# Patient Record
Sex: FEMALE | Race: WHITE | HISPANIC OR LATINO | Employment: OTHER | URBAN - METROPOLITAN AREA
[De-identification: names, ages, dates, MRNs, and addresses within clinical notes are randomized per-mention and may not be internally consistent; named-entity substitution may affect disease eponyms.]

---

## 2017-02-20 ENCOUNTER — TRANSCRIBE ORDERS (OUTPATIENT)
Dept: ADMINISTRATIVE | Facility: HOSPITAL | Age: 57
End: 2017-02-20

## 2017-02-20 ENCOUNTER — ALLSCRIPTS OFFICE VISIT (OUTPATIENT)
Dept: OTHER | Facility: OTHER | Age: 57
End: 2017-02-20

## 2017-02-20 DIAGNOSIS — C50.912 MALIGNANT NEOPLASM OF LEFT FEMALE BREAST, UNSPECIFIED SITE OF BREAST: Primary | ICD-10-CM

## 2017-03-21 ENCOUNTER — APPOINTMENT (OUTPATIENT)
Dept: LAB | Facility: HOSPITAL | Age: 57
End: 2017-03-21
Payer: COMMERCIAL

## 2017-03-21 DIAGNOSIS — E78.5 HYPERLIPIDEMIA: ICD-10-CM

## 2017-03-21 DIAGNOSIS — L30.9 DERMATITIS: ICD-10-CM

## 2017-03-21 DIAGNOSIS — E03.9 HYPOTHYROIDISM: ICD-10-CM

## 2017-03-21 DIAGNOSIS — I10 ESSENTIAL (PRIMARY) HYPERTENSION: ICD-10-CM

## 2017-03-21 LAB
ALBUMIN SERPL BCP-MCNC: 3.4 G/DL (ref 3.5–5)
ALP SERPL-CCNC: 48 U/L (ref 46–116)
ALT SERPL W P-5'-P-CCNC: 22 U/L (ref 12–78)
ANION GAP SERPL CALCULATED.3IONS-SCNC: 8 MMOL/L (ref 4–13)
AST SERPL W P-5'-P-CCNC: 15 U/L (ref 5–45)
BASOPHILS # BLD AUTO: 0.03 THOUSANDS/ΜL (ref 0–0.1)
BASOPHILS NFR BLD AUTO: 1 % (ref 0–1)
BILIRUB SERPL-MCNC: 0.32 MG/DL (ref 0.2–1)
BUN SERPL-MCNC: 23 MG/DL (ref 5–25)
CALCIUM SERPL-MCNC: 9.1 MG/DL (ref 8.3–10.1)
CHLORIDE SERPL-SCNC: 104 MMOL/L (ref 100–108)
CHOLEST SERPL-MCNC: 154 MG/DL (ref 50–200)
CO2 SERPL-SCNC: 29 MMOL/L (ref 21–32)
CREAT SERPL-MCNC: 1.07 MG/DL (ref 0.6–1.3)
EOSINOPHIL # BLD AUTO: 0.09 THOUSAND/ΜL (ref 0–0.61)
EOSINOPHIL NFR BLD AUTO: 2 % (ref 0–6)
ERYTHROCYTE [DISTWIDTH] IN BLOOD BY AUTOMATED COUNT: 13.2 % (ref 11.6–15.1)
GFR SERPL CREATININE-BSD FRML MDRD: 53 ML/MIN/1.73SQ M
GLUCOSE P FAST SERPL-MCNC: 109 MG/DL (ref 65–99)
HCT VFR BLD AUTO: 36 % (ref 34.8–46.1)
HDLC SERPL-MCNC: 71 MG/DL (ref 40–60)
HGB BLD-MCNC: 11.5 G/DL (ref 11.5–15.4)
LDLC SERPL CALC-MCNC: 69 MG/DL (ref 0–100)
LYMPHOCYTES # BLD AUTO: 2.11 THOUSANDS/ΜL (ref 0.6–4.47)
LYMPHOCYTES NFR BLD AUTO: 42 % (ref 14–44)
MCH RBC QN AUTO: 27.8 PG (ref 26.8–34.3)
MCHC RBC AUTO-ENTMCNC: 31.9 G/DL (ref 31.4–37.4)
MCV RBC AUTO: 87 FL (ref 82–98)
MONOCYTES # BLD AUTO: 0.45 THOUSAND/ΜL (ref 0.17–1.22)
MONOCYTES NFR BLD AUTO: 9 % (ref 4–12)
NEUTROPHILS # BLD AUTO: 2.37 THOUSANDS/ΜL (ref 1.85–7.62)
NEUTS SEG NFR BLD AUTO: 46 % (ref 43–75)
PLATELET # BLD AUTO: 373 THOUSANDS/UL (ref 149–390)
PMV BLD AUTO: 9.7 FL (ref 8.9–12.7)
POTASSIUM SERPL-SCNC: 3.8 MMOL/L (ref 3.5–5.3)
PROT SERPL-MCNC: 7.1 G/DL (ref 6.4–8.2)
RBC # BLD AUTO: 4.14 MILLION/UL (ref 3.81–5.12)
SODIUM SERPL-SCNC: 141 MMOL/L (ref 136–145)
T4 FREE SERPL-MCNC: 1.41 NG/DL (ref 0.76–1.46)
TRIGL SERPL-MCNC: 69 MG/DL
TSH SERPL DL<=0.05 MIU/L-ACNC: 0.52 UIU/ML (ref 0.36–3.74)
WBC # BLD AUTO: 5.05 THOUSAND/UL (ref 4.31–10.16)

## 2017-03-21 PROCEDURE — 84439 ASSAY OF FREE THYROXINE: CPT

## 2017-03-21 PROCEDURE — 85025 COMPLETE CBC W/AUTO DIFF WBC: CPT

## 2017-03-21 PROCEDURE — 84443 ASSAY THYROID STIM HORMONE: CPT

## 2017-03-21 PROCEDURE — 80061 LIPID PANEL: CPT

## 2017-03-21 PROCEDURE — 80053 COMPREHEN METABOLIC PANEL: CPT

## 2017-03-21 PROCEDURE — 36415 COLL VENOUS BLD VENIPUNCTURE: CPT

## 2017-03-24 ENCOUNTER — GENERIC CONVERSION - ENCOUNTER (OUTPATIENT)
Dept: OTHER | Facility: OTHER | Age: 57
End: 2017-03-24

## 2017-05-10 ENCOUNTER — APPOINTMENT (OUTPATIENT)
Dept: LAB | Facility: CLINIC | Age: 57
End: 2017-05-10
Payer: COMMERCIAL

## 2017-05-10 ENCOUNTER — TRANSCRIBE ORDERS (OUTPATIENT)
Dept: LAB | Facility: CLINIC | Age: 57
End: 2017-05-10

## 2017-05-10 ENCOUNTER — ALLSCRIPTS OFFICE VISIT (OUTPATIENT)
Dept: OTHER | Facility: OTHER | Age: 57
End: 2017-05-10

## 2017-05-10 DIAGNOSIS — M25.50 PAIN IN JOINT: ICD-10-CM

## 2017-05-10 LAB
25(OH)D3 SERPL-MCNC: 50.6 NG/ML (ref 30–100)
BASOPHILS # BLD AUTO: 0.02 THOUSANDS/ΜL (ref 0–0.1)
BASOPHILS NFR BLD AUTO: 0 % (ref 0–1)
EOSINOPHIL # BLD AUTO: 0.09 THOUSAND/ΜL (ref 0–0.61)
EOSINOPHIL NFR BLD AUTO: 2 % (ref 0–6)
ERYTHROCYTE [DISTWIDTH] IN BLOOD BY AUTOMATED COUNT: 13.6 % (ref 11.6–15.1)
HCT VFR BLD AUTO: 38.2 % (ref 34.8–46.1)
HGB BLD-MCNC: 12.2 G/DL (ref 11.5–15.4)
LYMPHOCYTES # BLD AUTO: 2.3 THOUSANDS/ΜL (ref 0.6–4.47)
LYMPHOCYTES NFR BLD AUTO: 44 % (ref 14–44)
MCH RBC QN AUTO: 28 PG (ref 26.8–34.3)
MCHC RBC AUTO-ENTMCNC: 31.9 G/DL (ref 31.4–37.4)
MCV RBC AUTO: 88 FL (ref 82–98)
MONOCYTES # BLD AUTO: 0.42 THOUSAND/ΜL (ref 0.17–1.22)
MONOCYTES NFR BLD AUTO: 8 % (ref 4–12)
NEUTROPHILS # BLD AUTO: 2.38 THOUSANDS/ΜL (ref 1.85–7.62)
NEUTS SEG NFR BLD AUTO: 46 % (ref 43–75)
NRBC BLD AUTO-RTO: 0 /100 WBCS
PLATELET # BLD AUTO: 371 THOUSANDS/UL (ref 149–390)
PMV BLD AUTO: 9.9 FL (ref 8.9–12.7)
RBC # BLD AUTO: 4.35 MILLION/UL (ref 3.81–5.12)
WBC # BLD AUTO: 5.22 THOUSAND/UL (ref 4.31–10.16)

## 2017-05-10 PROCEDURE — 86430 RHEUMATOID FACTOR TEST QUAL: CPT

## 2017-05-10 PROCEDURE — 36415 COLL VENOUS BLD VENIPUNCTURE: CPT

## 2017-05-10 PROCEDURE — 86038 ANTINUCLEAR ANTIBODIES: CPT

## 2017-05-10 PROCEDURE — 85025 COMPLETE CBC W/AUTO DIFF WBC: CPT

## 2017-05-10 PROCEDURE — 82306 VITAMIN D 25 HYDROXY: CPT

## 2017-05-11 LAB — RHEUMATOID FACT SER QL LA: NEGATIVE

## 2017-05-12 LAB — RYE IGE QN: NEGATIVE

## 2017-05-14 ENCOUNTER — GENERIC CONVERSION - ENCOUNTER (OUTPATIENT)
Dept: OTHER | Facility: OTHER | Age: 57
End: 2017-05-14

## 2017-05-26 ENCOUNTER — HOSPITAL ENCOUNTER (OUTPATIENT)
Dept: MAMMOGRAPHY | Facility: CLINIC | Age: 57
Discharge: HOME | End: 2017-05-26
Payer: COMMERCIAL

## 2017-05-26 DIAGNOSIS — C50.912 MALIGNANT NEOPLASM OF LEFT FEMALE BREAST (HCC): ICD-10-CM

## 2017-05-26 PROCEDURE — G0206 DX MAMMO INCL CAD UNI: HCPCS

## 2017-06-05 ENCOUNTER — ALLSCRIPTS OFFICE VISIT (OUTPATIENT)
Dept: OTHER | Facility: OTHER | Age: 57
End: 2017-06-05

## 2017-06-05 ENCOUNTER — TRANSCRIBE ORDERS (OUTPATIENT)
Dept: ADMINISTRATIVE | Facility: HOSPITAL | Age: 57
End: 2017-06-05

## 2017-06-05 DIAGNOSIS — Z12.31 VISIT FOR SCREENING MAMMOGRAM: Primary | ICD-10-CM

## 2017-06-06 ENCOUNTER — GENERIC CONVERSION - ENCOUNTER (OUTPATIENT)
Dept: OTHER | Facility: OTHER | Age: 57
End: 2017-06-06

## 2017-06-15 ENCOUNTER — APPOINTMENT (OUTPATIENT)
Dept: PHYSICAL THERAPY | Facility: REHABILITATION | Age: 57
End: 2017-06-15
Payer: COMMERCIAL

## 2017-06-15 DIAGNOSIS — C50.912 MALIGNANT NEOPLASM OF LEFT FEMALE BREAST (HCC): ICD-10-CM

## 2017-06-15 PROCEDURE — G8990 OTHER PT/OT CURRENT STATUS: HCPCS

## 2017-06-15 PROCEDURE — 97161 PT EVAL LOW COMPLEX 20 MIN: CPT

## 2017-06-15 PROCEDURE — G8991 OTHER PT/OT GOAL STATUS: HCPCS

## 2017-06-21 ENCOUNTER — APPOINTMENT (OUTPATIENT)
Dept: PHYSICAL THERAPY | Facility: REHABILITATION | Age: 57
End: 2017-06-21
Payer: COMMERCIAL

## 2017-06-21 PROCEDURE — 97140 MANUAL THERAPY 1/> REGIONS: CPT

## 2017-06-23 ENCOUNTER — APPOINTMENT (OUTPATIENT)
Dept: PHYSICAL THERAPY | Facility: REHABILITATION | Age: 57
End: 2017-06-23
Payer: COMMERCIAL

## 2017-06-23 PROCEDURE — 97140 MANUAL THERAPY 1/> REGIONS: CPT

## 2017-06-26 ENCOUNTER — APPOINTMENT (OUTPATIENT)
Dept: PHYSICAL THERAPY | Facility: REHABILITATION | Age: 57
End: 2017-06-26
Payer: COMMERCIAL

## 2017-06-26 PROCEDURE — 97140 MANUAL THERAPY 1/> REGIONS: CPT

## 2017-06-28 ENCOUNTER — APPOINTMENT (OUTPATIENT)
Dept: PHYSICAL THERAPY | Facility: REHABILITATION | Age: 57
End: 2017-06-28
Payer: COMMERCIAL

## 2017-06-28 PROCEDURE — 97110 THERAPEUTIC EXERCISES: CPT

## 2017-06-28 PROCEDURE — 97140 MANUAL THERAPY 1/> REGIONS: CPT

## 2017-07-03 ENCOUNTER — APPOINTMENT (OUTPATIENT)
Dept: PHYSICAL THERAPY | Facility: REHABILITATION | Age: 57
End: 2017-07-03
Payer: COMMERCIAL

## 2017-07-03 PROCEDURE — 97140 MANUAL THERAPY 1/> REGIONS: CPT

## 2017-07-06 ENCOUNTER — APPOINTMENT (OUTPATIENT)
Dept: PHYSICAL THERAPY | Facility: REHABILITATION | Age: 57
End: 2017-07-06
Payer: COMMERCIAL

## 2017-07-06 PROCEDURE — 97110 THERAPEUTIC EXERCISES: CPT

## 2017-07-06 PROCEDURE — 97140 MANUAL THERAPY 1/> REGIONS: CPT

## 2017-07-10 ENCOUNTER — APPOINTMENT (OUTPATIENT)
Dept: PHYSICAL THERAPY | Facility: REHABILITATION | Age: 57
End: 2017-07-10
Payer: COMMERCIAL

## 2017-07-10 PROCEDURE — 97140 MANUAL THERAPY 1/> REGIONS: CPT

## 2017-07-12 ENCOUNTER — APPOINTMENT (OUTPATIENT)
Dept: PHYSICAL THERAPY | Facility: REHABILITATION | Age: 57
End: 2017-07-12
Payer: COMMERCIAL

## 2017-07-12 PROCEDURE — G8990 OTHER PT/OT CURRENT STATUS: HCPCS

## 2017-07-12 PROCEDURE — 97110 THERAPEUTIC EXERCISES: CPT

## 2017-07-12 PROCEDURE — G8991 OTHER PT/OT GOAL STATUS: HCPCS

## 2017-07-12 PROCEDURE — 97140 MANUAL THERAPY 1/> REGIONS: CPT

## 2017-07-17 ENCOUNTER — APPOINTMENT (OUTPATIENT)
Dept: PHYSICAL THERAPY | Facility: REHABILITATION | Age: 57
End: 2017-07-17
Payer: COMMERCIAL

## 2017-07-17 PROCEDURE — 97140 MANUAL THERAPY 1/> REGIONS: CPT

## 2017-07-20 ENCOUNTER — APPOINTMENT (OUTPATIENT)
Dept: PHYSICAL THERAPY | Facility: REHABILITATION | Age: 57
End: 2017-07-20
Payer: COMMERCIAL

## 2017-07-20 PROCEDURE — 97140 MANUAL THERAPY 1/> REGIONS: CPT

## 2017-07-20 PROCEDURE — 97110 THERAPEUTIC EXERCISES: CPT

## 2017-07-24 ENCOUNTER — APPOINTMENT (OUTPATIENT)
Dept: PHYSICAL THERAPY | Facility: REHABILITATION | Age: 57
End: 2017-07-24
Payer: COMMERCIAL

## 2017-07-24 PROCEDURE — 97140 MANUAL THERAPY 1/> REGIONS: CPT

## 2017-07-27 ENCOUNTER — APPOINTMENT (OUTPATIENT)
Dept: PHYSICAL THERAPY | Facility: REHABILITATION | Age: 57
End: 2017-07-27
Payer: COMMERCIAL

## 2017-07-27 PROCEDURE — 97110 THERAPEUTIC EXERCISES: CPT

## 2017-07-27 PROCEDURE — 97140 MANUAL THERAPY 1/> REGIONS: CPT

## 2017-08-02 ENCOUNTER — APPOINTMENT (OUTPATIENT)
Dept: PHYSICAL THERAPY | Facility: REHABILITATION | Age: 57
End: 2017-08-02
Payer: COMMERCIAL

## 2017-08-02 PROCEDURE — 97140 MANUAL THERAPY 1/> REGIONS: CPT

## 2017-08-03 ENCOUNTER — APPOINTMENT (OUTPATIENT)
Dept: PHYSICAL THERAPY | Facility: REHABILITATION | Age: 57
End: 2017-08-03
Payer: COMMERCIAL

## 2017-08-03 PROCEDURE — 97140 MANUAL THERAPY 1/> REGIONS: CPT

## 2017-08-07 ENCOUNTER — APPOINTMENT (OUTPATIENT)
Dept: PHYSICAL THERAPY | Facility: REHABILITATION | Age: 57
End: 2017-08-07
Payer: COMMERCIAL

## 2017-08-07 PROCEDURE — 97140 MANUAL THERAPY 1/> REGIONS: CPT

## 2017-08-09 ENCOUNTER — GENERIC CONVERSION - ENCOUNTER (OUTPATIENT)
Dept: OTHER | Facility: OTHER | Age: 57
End: 2017-08-09

## 2017-08-09 ENCOUNTER — APPOINTMENT (OUTPATIENT)
Dept: PHYSICAL THERAPY | Facility: REHABILITATION | Age: 57
End: 2017-08-09
Payer: COMMERCIAL

## 2017-08-09 PROCEDURE — G8990 OTHER PT/OT CURRENT STATUS: HCPCS

## 2017-08-09 PROCEDURE — G8991 OTHER PT/OT GOAL STATUS: HCPCS

## 2017-08-09 PROCEDURE — 97140 MANUAL THERAPY 1/> REGIONS: CPT

## 2017-08-14 ENCOUNTER — APPOINTMENT (OUTPATIENT)
Dept: PHYSICAL THERAPY | Facility: REHABILITATION | Age: 57
End: 2017-08-14
Payer: COMMERCIAL

## 2017-08-14 PROCEDURE — 97140 MANUAL THERAPY 1/> REGIONS: CPT

## 2017-08-16 ENCOUNTER — APPOINTMENT (OUTPATIENT)
Dept: PHYSICAL THERAPY | Facility: REHABILITATION | Age: 57
End: 2017-08-16
Payer: COMMERCIAL

## 2017-08-16 PROCEDURE — 97140 MANUAL THERAPY 1/> REGIONS: CPT

## 2017-08-18 ENCOUNTER — ALLSCRIPTS OFFICE VISIT (OUTPATIENT)
Dept: OTHER | Facility: OTHER | Age: 57
End: 2017-08-18

## 2017-08-21 ENCOUNTER — APPOINTMENT (OUTPATIENT)
Dept: PHYSICAL THERAPY | Facility: REHABILITATION | Age: 57
End: 2017-08-21
Payer: COMMERCIAL

## 2017-08-21 PROCEDURE — 97140 MANUAL THERAPY 1/> REGIONS: CPT

## 2017-08-23 ENCOUNTER — APPOINTMENT (OUTPATIENT)
Dept: PHYSICAL THERAPY | Facility: REHABILITATION | Age: 57
End: 2017-08-23
Payer: COMMERCIAL

## 2017-08-23 PROCEDURE — 97140 MANUAL THERAPY 1/> REGIONS: CPT

## 2017-09-05 ENCOUNTER — APPOINTMENT (OUTPATIENT)
Dept: PHYSICAL THERAPY | Facility: REHABILITATION | Age: 57
End: 2017-09-05
Payer: COMMERCIAL

## 2017-09-05 DIAGNOSIS — R73.09 OTHER ABNORMAL GLUCOSE: ICD-10-CM

## 2017-09-05 DIAGNOSIS — E78.5 HYPERLIPIDEMIA: ICD-10-CM

## 2017-09-05 DIAGNOSIS — M54.50 LOW BACK PAIN: ICD-10-CM

## 2017-09-05 DIAGNOSIS — E03.9 HYPOTHYROIDISM: ICD-10-CM

## 2017-09-05 DIAGNOSIS — M25.50 PAIN IN JOINT: ICD-10-CM

## 2017-09-05 DIAGNOSIS — I10 ESSENTIAL (PRIMARY) HYPERTENSION: ICD-10-CM

## 2017-09-05 PROCEDURE — 97140 MANUAL THERAPY 1/> REGIONS: CPT

## 2017-09-07 ENCOUNTER — APPOINTMENT (OUTPATIENT)
Dept: PHYSICAL THERAPY | Facility: REHABILITATION | Age: 57
End: 2017-09-07
Payer: COMMERCIAL

## 2017-09-07 PROCEDURE — 97140 MANUAL THERAPY 1/> REGIONS: CPT

## 2017-09-12 ENCOUNTER — APPOINTMENT (OUTPATIENT)
Dept: PHYSICAL THERAPY | Facility: REHABILITATION | Age: 57
End: 2017-09-12
Payer: COMMERCIAL

## 2017-09-12 DIAGNOSIS — Z12.31 VISIT FOR SCREENING MAMMOGRAM: ICD-10-CM

## 2017-09-12 PROCEDURE — 97110 THERAPEUTIC EXERCISES: CPT

## 2017-09-12 PROCEDURE — 97140 MANUAL THERAPY 1/> REGIONS: CPT

## 2017-09-13 ENCOUNTER — APPOINTMENT (OUTPATIENT)
Dept: LAB | Facility: HOSPITAL | Age: 57
End: 2017-09-13
Payer: COMMERCIAL

## 2017-09-13 DIAGNOSIS — E03.9 HYPOTHYROIDISM: ICD-10-CM

## 2017-09-13 DIAGNOSIS — R73.09 OTHER ABNORMAL GLUCOSE: ICD-10-CM

## 2017-09-13 DIAGNOSIS — I10 ESSENTIAL (PRIMARY) HYPERTENSION: ICD-10-CM

## 2017-09-13 DIAGNOSIS — E78.5 HYPERLIPIDEMIA: ICD-10-CM

## 2017-09-13 LAB
ALBUMIN SERPL BCP-MCNC: 3.8 G/DL (ref 3.5–5)
ALP SERPL-CCNC: 53 U/L (ref 46–116)
ALT SERPL W P-5'-P-CCNC: 24 U/L (ref 12–78)
ANION GAP SERPL CALCULATED.3IONS-SCNC: 6 MMOL/L (ref 4–13)
AST SERPL W P-5'-P-CCNC: 18 U/L (ref 5–45)
BILIRUB SERPL-MCNC: 0.35 MG/DL (ref 0.2–1)
BUN SERPL-MCNC: 19 MG/DL (ref 5–25)
CALCIUM SERPL-MCNC: 9.5 MG/DL (ref 8.3–10.1)
CHLORIDE SERPL-SCNC: 105 MMOL/L (ref 100–108)
CHOLEST SERPL-MCNC: 161 MG/DL (ref 50–200)
CO2 SERPL-SCNC: 30 MMOL/L (ref 21–32)
CREAT SERPL-MCNC: 1.07 MG/DL (ref 0.6–1.3)
EST. AVERAGE GLUCOSE BLD GHB EST-MCNC: 137 MG/DL
GFR SERPL CREATININE-BSD FRML MDRD: 58 ML/MIN/1.73SQ M
GLUCOSE P FAST SERPL-MCNC: 100 MG/DL (ref 65–99)
HBA1C MFR BLD: 6.4 % (ref 4.2–6.3)
HDLC SERPL-MCNC: 68 MG/DL (ref 40–60)
LDLC SERPL CALC-MCNC: 78 MG/DL (ref 0–100)
POTASSIUM SERPL-SCNC: 4 MMOL/L (ref 3.5–5.3)
PROT SERPL-MCNC: 7.9 G/DL (ref 6.4–8.2)
SODIUM SERPL-SCNC: 141 MMOL/L (ref 136–145)
T4 FREE SERPL-MCNC: 1.36 NG/DL (ref 0.76–1.46)
TRIGL SERPL-MCNC: 77 MG/DL
TSH SERPL DL<=0.05 MIU/L-ACNC: 0.54 UIU/ML (ref 0.36–3.74)

## 2017-09-13 PROCEDURE — 83036 HEMOGLOBIN GLYCOSYLATED A1C: CPT

## 2017-09-13 PROCEDURE — 80053 COMPREHEN METABOLIC PANEL: CPT

## 2017-09-13 PROCEDURE — 36415 COLL VENOUS BLD VENIPUNCTURE: CPT

## 2017-09-13 PROCEDURE — 84439 ASSAY OF FREE THYROXINE: CPT

## 2017-09-13 PROCEDURE — 80061 LIPID PANEL: CPT

## 2017-09-13 PROCEDURE — 84443 ASSAY THYROID STIM HORMONE: CPT

## 2017-09-14 ENCOUNTER — GENERIC CONVERSION - ENCOUNTER (OUTPATIENT)
Dept: OTHER | Facility: OTHER | Age: 57
End: 2017-09-14

## 2017-09-14 ENCOUNTER — APPOINTMENT (OUTPATIENT)
Dept: PHYSICAL THERAPY | Facility: REHABILITATION | Age: 57
End: 2017-09-14
Payer: COMMERCIAL

## 2017-09-14 PROCEDURE — 97140 MANUAL THERAPY 1/> REGIONS: CPT

## 2017-09-14 PROCEDURE — G8991 OTHER PT/OT GOAL STATUS: HCPCS

## 2017-09-14 PROCEDURE — G8992 OTHER PT/OT  D/C STATUS: HCPCS

## 2017-09-14 PROCEDURE — 97110 THERAPEUTIC EXERCISES: CPT

## 2017-09-16 ENCOUNTER — GENERIC CONVERSION - ENCOUNTER (OUTPATIENT)
Dept: OTHER | Facility: OTHER | Age: 57
End: 2017-09-16

## 2017-09-19 ENCOUNTER — GENERIC CONVERSION - ENCOUNTER (OUTPATIENT)
Dept: OTHER | Facility: OTHER | Age: 57
End: 2017-09-19

## 2017-09-22 ENCOUNTER — ALLSCRIPTS OFFICE VISIT (OUTPATIENT)
Dept: OTHER | Facility: OTHER | Age: 57
End: 2017-09-22

## 2017-10-02 ENCOUNTER — APPOINTMENT (OUTPATIENT)
Dept: PHYSICAL THERAPY | Facility: REHABILITATION | Age: 57
End: 2017-10-02
Payer: COMMERCIAL

## 2017-10-02 ENCOUNTER — GENERIC CONVERSION - ENCOUNTER (OUTPATIENT)
Dept: OTHER | Facility: OTHER | Age: 57
End: 2017-10-02

## 2017-10-02 DIAGNOSIS — M54.50 LOW BACK PAIN: ICD-10-CM

## 2017-10-02 PROCEDURE — G8979 MOBILITY GOAL STATUS: HCPCS

## 2017-10-02 PROCEDURE — G8978 MOBILITY CURRENT STATUS: HCPCS

## 2017-10-02 PROCEDURE — 97161 PT EVAL LOW COMPLEX 20 MIN: CPT

## 2017-10-05 ENCOUNTER — APPOINTMENT (OUTPATIENT)
Dept: PHYSICAL THERAPY | Facility: REHABILITATION | Age: 57
End: 2017-10-05
Payer: COMMERCIAL

## 2017-10-05 PROCEDURE — 97014 ELECTRIC STIMULATION THERAPY: CPT

## 2017-10-05 PROCEDURE — 97110 THERAPEUTIC EXERCISES: CPT

## 2017-10-09 ENCOUNTER — APPOINTMENT (OUTPATIENT)
Dept: PHYSICAL THERAPY | Facility: REHABILITATION | Age: 57
End: 2017-10-09
Payer: COMMERCIAL

## 2017-10-09 PROCEDURE — 97014 ELECTRIC STIMULATION THERAPY: CPT

## 2017-10-09 PROCEDURE — 97110 THERAPEUTIC EXERCISES: CPT

## 2017-10-12 ENCOUNTER — APPOINTMENT (OUTPATIENT)
Dept: PHYSICAL THERAPY | Facility: REHABILITATION | Age: 57
End: 2017-10-12
Payer: COMMERCIAL

## 2017-10-12 PROCEDURE — 97014 ELECTRIC STIMULATION THERAPY: CPT

## 2017-10-12 PROCEDURE — 97110 THERAPEUTIC EXERCISES: CPT

## 2017-10-17 ENCOUNTER — APPOINTMENT (OUTPATIENT)
Dept: PHYSICAL THERAPY | Facility: REHABILITATION | Age: 57
End: 2017-10-17
Payer: COMMERCIAL

## 2017-10-17 PROCEDURE — 97014 ELECTRIC STIMULATION THERAPY: CPT

## 2017-10-17 PROCEDURE — 97110 THERAPEUTIC EXERCISES: CPT

## 2017-10-19 ENCOUNTER — APPOINTMENT (OUTPATIENT)
Dept: PHYSICAL THERAPY | Facility: REHABILITATION | Age: 57
End: 2017-10-19
Payer: COMMERCIAL

## 2017-10-19 PROCEDURE — 97014 ELECTRIC STIMULATION THERAPY: CPT

## 2017-10-19 PROCEDURE — 97110 THERAPEUTIC EXERCISES: CPT

## 2017-10-24 ENCOUNTER — APPOINTMENT (OUTPATIENT)
Dept: PHYSICAL THERAPY | Facility: REHABILITATION | Age: 57
End: 2017-10-24
Payer: COMMERCIAL

## 2017-10-24 PROCEDURE — 97014 ELECTRIC STIMULATION THERAPY: CPT

## 2017-10-24 PROCEDURE — 97110 THERAPEUTIC EXERCISES: CPT

## 2017-10-27 ENCOUNTER — APPOINTMENT (OUTPATIENT)
Dept: PHYSICAL THERAPY | Facility: REHABILITATION | Age: 57
End: 2017-10-27
Payer: COMMERCIAL

## 2017-10-27 PROCEDURE — 97014 ELECTRIC STIMULATION THERAPY: CPT

## 2017-10-27 PROCEDURE — 97110 THERAPEUTIC EXERCISES: CPT

## 2017-10-30 ENCOUNTER — APPOINTMENT (OUTPATIENT)
Dept: PHYSICAL THERAPY | Facility: REHABILITATION | Age: 57
End: 2017-10-30
Payer: COMMERCIAL

## 2017-10-30 PROCEDURE — 97112 NEUROMUSCULAR REEDUCATION: CPT

## 2017-10-30 PROCEDURE — G8978 MOBILITY CURRENT STATUS: HCPCS

## 2017-10-30 PROCEDURE — 97014 ELECTRIC STIMULATION THERAPY: CPT

## 2017-10-30 PROCEDURE — G8980 MOBILITY D/C STATUS: HCPCS

## 2017-10-30 PROCEDURE — G8979 MOBILITY GOAL STATUS: HCPCS

## 2017-10-30 PROCEDURE — 97110 THERAPEUTIC EXERCISES: CPT

## 2017-10-30 PROCEDURE — 97140 MANUAL THERAPY 1/> REGIONS: CPT

## 2017-11-13 ENCOUNTER — GENERIC CONVERSION - ENCOUNTER (OUTPATIENT)
Dept: OTHER | Facility: OTHER | Age: 57
End: 2017-11-13

## 2017-12-11 ENCOUNTER — ALLSCRIPTS OFFICE VISIT (OUTPATIENT)
Dept: OTHER | Facility: OTHER | Age: 57
End: 2017-12-11

## 2017-12-11 ENCOUNTER — TRANSCRIBE ORDERS (OUTPATIENT)
Dept: LAB | Facility: CLINIC | Age: 57
End: 2017-12-11

## 2017-12-11 ENCOUNTER — APPOINTMENT (OUTPATIENT)
Dept: LAB | Facility: CLINIC | Age: 57
End: 2017-12-11
Payer: COMMERCIAL

## 2017-12-11 DIAGNOSIS — E78.5 HYPERLIPIDEMIA: ICD-10-CM

## 2017-12-11 DIAGNOSIS — E03.9 HYPOTHYROIDISM: ICD-10-CM

## 2017-12-11 LAB
ALBUMIN SERPL BCP-MCNC: 3.6 G/DL (ref 3.5–5)
ALP SERPL-CCNC: 56 U/L (ref 46–116)
ALT SERPL W P-5'-P-CCNC: 27 U/L (ref 12–78)
ANION GAP SERPL CALCULATED.3IONS-SCNC: 5 MMOL/L (ref 4–13)
AST SERPL W P-5'-P-CCNC: 14 U/L (ref 5–45)
BILIRUB SERPL-MCNC: 0.41 MG/DL (ref 0.2–1)
BUN SERPL-MCNC: 15 MG/DL (ref 5–25)
CALCIUM SERPL-MCNC: 9.7 MG/DL (ref 8.3–10.1)
CHLORIDE SERPL-SCNC: 105 MMOL/L (ref 100–108)
CHOLEST SERPL-MCNC: 157 MG/DL (ref 50–200)
CO2 SERPL-SCNC: 29 MMOL/L (ref 21–32)
CREAT SERPL-MCNC: 0.93 MG/DL (ref 0.6–1.3)
GFR SERPL CREATININE-BSD FRML MDRD: 68 ML/MIN/1.73SQ M
GLUCOSE P FAST SERPL-MCNC: 93 MG/DL (ref 65–99)
HBA1C MFR BLD HPLC: 5.8 %
HDLC SERPL-MCNC: 72 MG/DL (ref 40–60)
LDLC SERPL CALC-MCNC: 63 MG/DL (ref 0–100)
POTASSIUM SERPL-SCNC: 3.8 MMOL/L (ref 3.5–5.3)
PROT SERPL-MCNC: 7.9 G/DL (ref 6.4–8.2)
SODIUM SERPL-SCNC: 139 MMOL/L (ref 136–145)
TRIGL SERPL-MCNC: 111 MG/DL

## 2017-12-11 PROCEDURE — 80061 LIPID PANEL: CPT

## 2017-12-11 PROCEDURE — 36415 COLL VENOUS BLD VENIPUNCTURE: CPT

## 2017-12-11 PROCEDURE — 80053 COMPREHEN METABOLIC PANEL: CPT

## 2017-12-19 ENCOUNTER — GENERIC CONVERSION - ENCOUNTER (OUTPATIENT)
Dept: OTHER | Facility: OTHER | Age: 57
End: 2017-12-19

## 2018-01-10 NOTE — RESULT NOTES
Discussion/Summary   SERA test for lupus and autoimmune diseases is negative  Rheumatoid factor is negative  CBC is in normal range  Vitamin D level is in normal range  May continue OTC vitamin D supplement  Verified Results  (1) SERA SCREEN W/REFLEX TO TITER/PATTERN [id number] 10:11AM Joshua Montana    Order Number: [id number]     Test Name Result Flag Reference   SERA SCREEN  Negative  Negative     (1) VITAMIN D 25-HYDROXY [id number] 10:11AM Joshua Montana     Test Name Result Flag Reference   VIT D 25-HYDROX 50 6 ng/mL  30 0-100 0   This assay is a certified procedure of the CDC Vitamin D Standardization Certification Program (VDSCP)     Deficiency <20ng/ml   Insufficiency 20-30ng/ml   Sufficient  ng/ml     *Patients undergoing fluorescein dye angiography may retain small amounts of fluorescein in the body for 48-72 hours post procedure  Samples containing fluorescein can produce falsely elevated Vitamin D values  If the patient had this procedure, a specimen should be resubmitted post fluorescein clearance  (1) CBC/PLT/DIFF [id number] 10:11AM Joshua Montana     Test Name Result Flag Reference   WBC COUNT 5 22 Thousand/uL  4 31-10 16   RBC COUNT 4 35 Million/uL  3 81-5 12   HEMOGLOBIN 12 2 g/dL  11 5-15 4   HEMATOCRIT 38 2 %  34 8-46  1   MCV 88 fL  82-98   MCH 28 0 pg  26 8-34 3   MCHC 31 9 g/dL  31 4-37 4   RDW 13 6 %  11 6-15 1   MPV 9 9 fL  8 9-12 7   PLATELET COUNT 962 Thousands/uL  149-390   nRBC AUTOMATED 0 /100 WBCs     NEUTROPHILS RELATIVE PERCENT 46 %  43-75   LYMPHOCYTES RELATIVE PERCENT 44 %  14-44   MONOCYTES RELATIVE PERCENT 8 %  4-12   EOSINOPHILS RELATIVE PERCENT 2 %  0-6   BASOPHILS RELATIVE PERCENT 0 %  0-1   NEUTROPHILS ABSOLUTE COUNT 2 38 Thousands/? ??L  1 85-7 62   LYMPHOCYTES ABSOLUTE COUNT 2 30 Thousands/? ??L  0 60-4 47   MONOCYTES ABSOLUTE COUNT 0 42 Thousand/? ??L  0 17-1 22   EOSINOPHILS ABSOLUTE COUNT 0 09 Thousand/? ??L  0 00-0 61   BASOPHILS ABSOLUTE COUNT 0 02 Thousands/? ??L  0 00-0 10       Signatures   Electronically signed by : FAWAD Alcantara; May 14 2017  3:05PM EST                       (Author)

## 2018-01-11 NOTE — PROGRESS NOTES
Assessment    1  Eczema (692 9) (L30 9)   2  Hyperlipidemia (272 4) (E78 5)   3  Cervical cancer screening (V76 2) (Z12 4)   4  Benign essential hypertension (401 1) (I10)   5  Encounter for preventive health examination (V70 0) (Z00 00)   6  Hypothyroidism (244 9) (E03 9)    Plan  Benign essential hypertension    · (1) CBC/PLT/DIFF; Status:Active; Requested for:14Nov2016;    · (1) COMPREHENSIVE METABOLIC PANEL; Status:Active; Requested for:14Nov2016;   Eczema    · Triamcinolone Acetonide 0 1 % External Ointment; APPLY AND GENTLY  MASSAGE INTO AFFECTED AREA(S) TWICE DAILY  Eczema, Hypothyroidism    · (1) T4, FREE; Status:Active; Requested for:14Nov2016;    · (1) TSH; Status:Active; Requested for:14Nov2016; Health Maintenance    · *VB - Eye Exam; Status:Active; Requested for:14Nov2016;   Hyperlipidemia    · (1) LIPID PANEL FASTING W DIRECT LDL REFLEX; Status:Active; Requested  for:14Nov2016;   Hypothyroidism    · Levothyroxine Sodium 88 MCG Oral Tablet; take 1 tablet by mouth every day  Need for immunization against influenza    · Fluarix Intramuscular Suspension      *1 - [facility] Physician Referral  Consult  Status: Hold For - Scheduling  Requested for: 40FGJ8820  Ordered; For: Cervical cancer screening;  Ordered By: Harvey Coronado  Performed:   Due: 08PYZ1663  Ophthalmology Referral Other Physician Referral  Consult  Status: Hold For - Scheduling  Requested for: 80CIN4456  Ordered; For: Health Maintenance;  Ordered By: Harvey Coronado  Performed:   Due: 37QBL0716     Discussion/Summary    Begin triamcinolone ointment for eczema on right arm  Have labs completed prior to next visit in 3 months  Impression: Initial Annual Wellness Visit, with preventive exam as well as age and risk appropriate counseling completed       Cardiovascular screening and counseling: counseling was given on maintaining a healthy diet, counseling was given on maintaining a healthy weight, counseling was given on ways to improve cholesterol, counseling was given on ways to improve blood pressure, counseling was given on ways to improve exercise tolerance, due for a lipid panel, due for cholesterol, due for triglyceride and lipid panel is due every 6 months year(s)  Diabetes screening and counseling: the risks and benefits of screening were discussed, screening is current, counseling was given on maintaining a healthy diet, counseling was given on maintaining a healthy weight and counseling was given on ways to improve physical activity  Colorectal cancer screening and counseling: Scheduled for December  Breast cancer screening and counseling: screening mammogram due every 1 year(s) and Due for May 2017  Cervical cancer screening and counseling: Referred  Osteoporosis screening and counseling: the risks and benefits of screening were discussed and screening not indicated  Glaucoma screening and counseling: the risks and benefits of screening were discussed and ophthalmologist referral    HIV screening and counseling: the patient declines screening  Advance Directive Planning: not complete, Five Wishes discussed  Patient Discussion: plan discussed with the patient, follow-up visit needed in 3 months  Chief Complaint  65 y/o for annual wellness visit      History of Present Illness  HPI: Here for annual wellness exam        Welcome to Medicare and Wellness Visits: The patient is being seen for the subsequent annual wellness visit  Medicare Screening and Risk Factors   Hospitalizations: she has been previously hospitalizied and she has been hospitalized 1 times  Medicare Screening Tests Risk Questions   Drug and Alcohol Use: The patient is a former cigarette smoker, quit smoking 6 years and has never used smokeless tobacco  She has smoked for 20 year(s)  She Quit 6 years ago  The patient reports occasional alcohol use  Alcohol concern:   The patient has no concerns about alcohol abuse   She has never used illicit drugs  Diet and Physical Activity: Current diet includes unhealthy food choices, 0 servings of fruit per day, 1 servings of vegetables per day, 2 servings of meat per day, 1 servings of whole grains per day, 1 servings of simple carbohydrates per day, 2 servings of dairy products per day, 1 cups of coffee per day, 0 cups of tea per day, 2 cans of regular soda per day and 0 cans of diet soda per day  She exercises daily  Exercise: walking 15 minutes per day  Mood Disorder and Cognitive Impairment Screening: PHQ-9 Depression Scale   Over the past 2 weeks, how often have you been bothered by the following problems? 1 ) Little interest or pleasure in doing things? Not at all    2 ) Feeling down, depressed or hopeless? Several days  3 ) Trouble falling asleep or sleeping too much? Nearly every day  4 ) Feeling tired or having little energy? Not at all    5 ) Poor appetite or overeating? Not at all    6 ) Feeling bad about yourself, or that you are a failure, or have let yourself or your family down? Not at all    7 ) Trouble concentrating on things, such as reading a newspaper or watching television? Not at all    8 ) Moving or speaking so slowly that other people could have noticed, or the opposite, moving or speaking faster than usual? Not at all  TOTAL SCORE: 5, severity of depression is mild  How difficult have these problems made it for you to do your work, take care of things at home, or get along with people? Somewhat difficult  She reports feeling down, depressed, or hopeless over the past two weeks  She denies feeling little interest or pleasure in doing things over the past two weeks  Cognitive impairment screening: denies difficulty learning/retaining new information, denies difficulty handling complex tasks, difficulty with reasoning, denies difficulty with spatial ability and orientation, denies difficulty with language and denies difficulty with behavior     Functional Ability/Level of Safety: Hearing is a hearing aid is not used  She denies hearing difficulties  The patient is currently able to do activities of daily living without limitations  Activities of daily living details: transportation help needed, but does not need help using the phone, does not need help shopping, no meal preparation help needed, does not need help doing housework, does not need help doing laundry, does not need help managing medications and does not need help managing money  Fall risk factors: The patient fell 0 times in the past 12 months  Home safety risk factors:  loose rugs, uneven floors and no grab bars in the bathroom, but no unfamiliar surroundings, no poor household lighting, no household clutter and handrails on the stairs  Advance Directives: Advance directives: Discussed 5 Wishes - patient will consider completing , but no living will, no durable power of  for health care directives and no advance directives  Co-Managers and Medical Equipment/Suppliers: See Patient Care Team       , Adult Female: The patient is being seen for a health maintenance evaluation  The last health maintenance visit was >1 year(s) ago  General Health: The patient's health since the last visit is described as good  She does not have regular dental visits  She complains of vision problems  Lifestyle:  She consumes a diverse and healthy diet  She does not have any weight concerns  She exercises regularly  She does not use tobacco  She denies alcohol use  She denies drug use  Reproductive health: the patient is postmenopausal   she is not sexually active  pregnancy history:  Screening: cancer screening reviewed and current  Cervical cancer screening includes uncertain timing of her last pap smear  Breast cancer screening includes a mammogram performed May 2016  Colorectal cancer screening includes scheduled  metabolic screening reviewed and current   Metabolic screening includes lipid profile performed 10/16, glucose screening performed 10/16, thyroid function test performed 10/16 and no previous DEXA  Cardiovascular risk factors: stress, but no tobacco use, no illicit drug use and no sedentary lifestyle  General health risks: previous breast cancer  Safety elements used: smoke detector and carbon monoxide detector  Risk findings: passive smoke exposure and depression symptoms  Patient Care Team    Care Team Member Role Specialty Office Number   [facility] MD [address] [facility] Oncology [phone]   [facility] MD Specialist Radiation Oncology [phone]   [facility] Specialist General Surgery [phone]   [facility] [facility] [phone]     Review of Systems    Constitutional: negative  Head and Face: negative  Eyes: negative  ENT: negative  Cardiovascular: negative  Respiratory: negative  Gastrointestinal: negative  Genitourinary: negative  Musculoskeletal: negative  The patient presents with complaints of right arm a rash  The patient presents with complaints of edema (Left arm lymphedema)   Neurological: negative  Psychiatric: Mild depression due to stress of elderly mother needing assistance  Endocrine: hot flashes and Hot flashes due to medication for cancer  Hematologic and Lymphatic: negative  Active Problems    1  Abnormal mammogram (793 80) (R92 8)   2  Adenocarcinoma of breast stage I, left (174 9) (C50 912)   3  Backache (724 5) (M54 9)   4  Benign essential hypertension (401 1) (I10)   5  Breast cancer (174 9) (C50 919)   6  Cervical cancer screening (V76 2) (Z12 4)   7  Chest pain (786 50) (R07 9)   8  Depression (311) (F32 9)   9  Eczema (692 9) (L30 9)   10  Fever (780 60) (R50 9)   11  Gastritis (535 50) (K29 70)   12  Hot flashes (782 62) (R23 2)   13  Hyperlipidemia (272 4) (E78 5)   14  Hypothyroidism (244 9) (E03 9)   15  Lobular breast cancer, right (174 9) (C50 911)   16   Lymphedema (457 1) (I89 0)   17  Lymphedema (457 1) (I89 0)   18  Malignant neoplasm of left breast (174 9) (C50 912)   19  Need for immunization against influenza (V04 81) (Z23)   20  Nipple discharge (611 79) (N64 52)   21  Obesity (278 00) (E66 9)   22  Scar conditions/skin fibrosis (709 2) (L90 5)   23  Seroma (998 13) (T14 8)   24  Viral infection (079 99) (B34 9)    Past Medical History    · History of Depression (311) (F32 9)   · History of Diverticulosis (562 10) (K57 90)   · History of hemorrhoids (V13 89) (Z87 19)   · History of hypothyroidism (V12 29) (Z86 39)   · History of radiation therapy (V15 3) (Z92 3)   · History of Thyroid Cancer (V10 87)    Surgical History    · History of Breast Reconstruction With Implant Prosthesis   · History of Breast Surgery   · History of Breast Surgery Nipple/Areola Reconstruction Right Breast   · History of Bx Breast Percutan Needle Core Use Imag Guide (Stereotactic)   · History of Complete Colonoscopy   · History of Incisional Breast Biopsy   · History of Left Breast Lumpectomy   · History of Modified Radical Mastectomy Right Breast   · History of Thyroid Surgery Total Thyroidectomy    The surgical history was reviewed and updated today  Family History  Mother    · Family history of Diabetes   · Family history of Forgetfulness  Father    · Family history of    · Family history of Diagnosis unknown  Sister    · Family history of Diagnosis unknown   · Family history of malignant neoplasm of cervix (V16 49) (Z80 49)  Brother    · Family history of Liver disease  Family History    · Family history of Cancer    The family history was reviewed and updated today  Social History    · Being A Social Drinker   · Former smoker (P06 78) (Z90 999)   · Denied: History of drug use   · [employer]  The social history was reviewed and updated today  Current Meds   1  Anastrozole 1 MG Oral Tablet; TAKE 1 TABLET DAILY  BY MOUTH;    Therapy: 59TYM1439 to (Evaluate:2017) Requested for: 50Ryb0118; Last   Rx:78Rdl8134 Ordered   2  Atorvastatin Calcium 40 MG Oral Tablet; TAKE 1 TABLET BY MOUTH EVERY DAY   CHOLESTEROL PILL; Therapy: 97MZU8316 to ([phone])  Requested for: 72BRY0544; Last   Rx:14Iye1312 Ordered   3  Famotidine 40 MG Oral Tablet; TAKE 1 TABLET TWICE DAILY  BY MOUTH; Therapy: 91Dpy6098 to (Evaluate:04Jan2017)  Requested for: 30AFQ0055; Last   Rx:48Mav0087 Ordered   4  Levothyroxine Sodium 88 MCG Oral Tablet; take 1 tablet by mouth every day; Therapy: 25RNU2056 to (Evaluate:45Gjn3144)  Requested for: 04Oct2016; Last   Rx:04Oct2016 Ordered   5  Lisinopril 10 MG Oral Tablet; TAKE 1 TABLET DAILY AS DIRECTED  BLOOD   PRESSURE; Therapy: 06CHC2921 to ([phone])  Requested for: 63VRY3465; Last   Rx:59Frs1654 Ordered   6  TraMADol HCl - 50 MG Oral Tablet; TAKE 1 TABLET EVERY 6 HOURS AS NEEDED FOR   PAIN;   Therapy: 03YXQ7937 to (Evaluate:59Jmy3075); Last Rx:98Auj1143 Ordered    The medication list was reviewed and updated today  Allergies    1  No Known Drug Allergies    Immunizations   1    Influenza  06-Nov-2014     Vitals  Signs    Systolic: 086  Diastolic: 80  Heart Rate: 86  Respiration: 18  Temperature: 97 3 F  O2 Saturation: 98  Height: 5 ft 3 in  Weight: 161 lb   BMI Calculated: 28 52  BSA Calculated: 1 76    Physical Exam    Constitutional   General appearance: No acute distress, well appearing and well nourished  Eyes   Conjunctiva and lids: No swelling, erythema or discharge  Pupils and irises: Equal, round and reactive to light  Ears, Nose, Mouth, and Throat   External inspection of ears and nose: Normal     Otoscopic examination: Tympanic membranes translucent with normal light reflex  Canals patent without erythema  Oropharynx: Normal with no erythema, edema, exudate or lesions  Pulmonary   Respiratory effort: No increased work of breathing or signs of respiratory distress      Auscultation of lungs: Clear to auscultation  Cardiovascular   Auscultation of heart: Normal rate and rhythm, normal S1 and S2, without murmurs  Examination of extremities for edema and/or varicosities: Normal     Abdomen   Abdomen: Non-tender, no masses  Liver and spleen: No hepatomegaly or splenomegaly  Lymphatic   Palpation of lymph nodes in neck: No lymphadenopathy  Musculoskeletal   Gait and station: Normal     Digits and nails: Normal without clubbing or cyanosis  Inspection/palpation of joints, bones, and muscles: Normal     Skin Lymphedema noted in left arm  Examination of the skin for lesions: Abnormal   3 macular areas of erythema on right arm  Neurologic   Cranial nerves: Cranial nerves 2-12 intact  Reflexes: 2+ and symmetric  Sensation: No sensory loss  Psychiatric   Orientation to person, place, and time: Normal     Mood and affect: Abnormal   Mood and Affect: depressed  Future Appointments    Date/Time Provider Specialty Site   12/13/2016 08:45 AM Titi Bonilla[address] OR   02/20/2017 02:00 PM ASTER Jansen   Hematology Oncology [facility]   12/05/2016 09:30 AM [facility] FAWAD Rodrigues Schedule  [facility]     Signatures   Electronically signed by : Owen Hua, [address] [place] [address]; Nov 16 2016 12:56PM EST                       (Author)    Electronically signed by : ASTER Ayers ; Nov 16 2016  1:37PM EST

## 2018-01-12 NOTE — RESULT NOTES
Verified Results  (1) T4, FREE [id number] 01:04PM Alfred Becky     Test Name Result Flag Reference   T4,FREE 1 08 ng/dL  0 76-1 46     (1) COMPREHENSIVE METABOLIC PANEL 40DVG4771 55:92WT Autumn Christianson 64 Kidney Disease Education Program recommendations are as follows:  GFR calculation is accurate only with a steady state creatinine  Chronic Kidney disease less than 60 ml/min/1 73 sq  meters  Kidney failure less than 15 ml/min/1 73 sq  meters  Test Name Result Flag Reference   GLUCOSE,RANDM 99 mg/dL     If the patient is fasting, the ADA then defines impaired fasting glucose as > 100 mg/dL and diabetes as > or equal to 123 mg/dL  SODIUM 143 mmol/L  136-145   POTASSIUM 3 5 mmol/L  3 5-5 3   CHLORIDE 104 mmol/L  100-108   CARBON DIOXIDE 31 mmol/L  21-32   ANION GAP (CALC) 8 mmol/L  4-13   BLOOD UREA NITROGEN 13 mg/dL  5-25   CREATININE 1 13 mg/dL  0 60-1 30   Standardized to IDMS reference method   CALCIUM 8 8 mg/dL  8 3-10 1   BILI, TOTAL 0 23 mg/dL  0 20-1 00   ALK PHOSPHATAS 51 U/L     ALT (SGPT) 24 U/L  12-78   AST(SGOT) 17 U/L  5-45   ALBUMIN 3 4 g/dL L 3 5-5 0   TOTAL PROTEIN 7 9 g/dL  6 4-8 2   eGFR Non-African American 50 0 ml/min/1 73sq m       (1) LIPID PANEL, FASTING 97OXP7444 12:14PM Alfred Pena   Triglyceride:         Normal              <150 mg/dl       Borderline High    150-199 mg/dl       High               200-499 mg/dl       Very High          >499 mg/dl  Cholesterol:         Desirable        <200 mg/dl      Borderline High  200-239 mg/dl      High             >239 mg/dl  HDL Cholesterol:        High    >59 mg/dL      Low     <41 mg/dL  LDL CALCULATED:    This screening LDL is a calculated result  It does not have the accuracy of the Direct Measured LDL in the monitoring of patients with hyperlipidemia and/or statin therapy  Direct Measure LDL (GAL334) must be ordered separately in these patients       Test Name Result Flag Reference   CHOLESTEROL 154 mg/dL   HDL,DIRECT 59 mg/dL  40-60   LDL CHOLESTEROL CALCULATED 76 mg/dL  0-100   TRIGLYCERIDES 94 mg/dL  <=150     (1) TSH [id number] 12:14PM Logan Brown   Patients undergoing fluorescein dye angiography may retain small amounts of fluorescein in the body for 48-72 hours post procedure  Samples containing fluorescein can produce falsely depressed TSH values  If the patient had this procedure,a specimen should be resubmitted post fluorescein clearance  The recommended reference ranges for TSH during pregnancy are as follows:  First trimester 0 1 to 2 5 uIU/mL  Second trimester  0 2 to 3 0 uIU/mL  Third trimester 0 3 to 3 0 uIU/m     Test Name Result Flag Reference   TSH 10 355 uIU/mL H 0 358-3 740     (1) CBC/PLT/DIFF 56PTH0252 11:12AM Logan Brown     Test Name Result Flag Reference   WBC COUNT 5 23 Thousand/uL  4 31-10 16   RBC COUNT 4 11 Million/uL  3 81-5 12   HEMOGLOBIN 11 4 g/dL L 11 5-15 4   HEMATOCRIT 35 4 %  34 8-46  1   MCV 86 1 fL  82 0-98 0   MCH 27 7 pg  26 8-34 3   MCHC 32 2 g/dL  31 4-37 4   RDW 13 3 %  11 6-15 1   MPV 9 0 fL  8 9-12 7   PLATELET COUNT 890 Thousands/uL H 149-390   nRBC AUTOMATED 0 /100 WBCs     NEUTROPHILS RELATIVE PERCENT 44 %  43-75   LYMPHOCYTES RELATIVE PERCENT 44 %  14-44   MONOCYTES RELATIVE PERCENT 8 %  4-12   EOSINOPHILS RELATIVE PERCENT 3 %  0-6   BASOPHILS RELATIVE PERCENT 1 %  0-1   NEUTROPHILS ABSOLUTE COUNT 2 31 Thousands/µL  1 85-7 62   LYMPHOCYTES ABSOLUTE COUNT 2 30 Thousands/µL  0 60-4 47   MONOCYTES ABSOLUTE COUNT 0 41 Thousand/µL  0 17-1 22   EOSINOPHILS ABSOLUTE COUNT 0 18 Thousand/µL  0 00-0 61   BASOPHILS ABSOLUTE COUNT 0 03 Thousands/µL  0 00-0 10       Plan  Hypothyroidism    · Levothyroxine Sodium 100 MCG Oral Tablet; TAKE 1 TABLET DAILY IN THE  MORNING    Discussion/Summary   Patient to stop levothyroxine 75mcg and begin levothyroxine 100mcg and follow up in 6 weeks for a recheck       Signatures   Electronically signed by : Gautam Wilson, Gregory Medina ; Jan 20 2016 8:45AM EST                       (Author)

## 2018-01-13 VITALS
HEART RATE: 78 BPM | TEMPERATURE: 97.5 F | OXYGEN SATURATION: 98 % | DIASTOLIC BLOOD PRESSURE: 62 MMHG | SYSTOLIC BLOOD PRESSURE: 112 MMHG | RESPIRATION RATE: 18 BRPM | WEIGHT: 166 LBS | HEIGHT: 63 IN | BODY MASS INDEX: 29.41 KG/M2

## 2018-01-13 VITALS
BODY MASS INDEX: 28.53 KG/M2 | HEIGHT: 63 IN | RESPIRATION RATE: 18 BRPM | DIASTOLIC BLOOD PRESSURE: 72 MMHG | TEMPERATURE: 97.3 F | SYSTOLIC BLOOD PRESSURE: 106 MMHG | WEIGHT: 161 LBS | HEART RATE: 82 BPM | OXYGEN SATURATION: 98 %

## 2018-01-13 NOTE — MISCELLANEOUS
Message  12/8/16 Survivorship treatment summary and care plan was reviewed  Active Problems    1  Abnormal mammogram (793 80) (R92 8)   2  Adenocarcinoma of breast stage I, left (174 9) (C50 912)   3  Backache (724 5) (M54 9)   4  Benign essential hypertension (401 1) (I10)   5  Breast cancer (174 9) (C50 919)   6  Cervical cancer screening (V76 2) (Z12 4)   7  Chest pain (786 50) (R07 9)   8  Depression (311) (F32 9)   9  Eczema (692 9) (L30 9)   10  Encounter for routine gynecological examination with Papanicolaou smear of cervix    (V72 31,V76 2) (Z01 419)   11  Fever (780 60) (R50 9)   12  Gastritis (535 50) (K29 70)   13  Hot flashes (782 62) (R23 2)   14  Hyperlipidemia (272 4) (E78 5)   15  Hypothyroidism (244 9) (E03 9)   16  Lobular breast cancer, right (174 9) (C50 911)   17  Lymphedema (457 1) (I89 0)   18  Lymphedema (457 1) (I89 0)   19  Malignant neoplasm of left breast (174 9) (C50 912)   20  Need for immunization against influenza (V04 81) (Z23)   21  Nipple discharge (611 79) (N64 52)   22  Obesity (278 00) (E66 9)   23  Scar conditions/skin fibrosis (709 2) (L90 5)   24  Seroma (998 13) (T14 8)   25  Viral infection (079 99) (B34 9)    Current Meds   1  Anastrozole 1 MG Oral Tablet; TAKE 1 TABLET DAILY  BY MOUTH; Therapy: 09PWA2219 to (Evaluate:18Feb2017)  Requested for: 99Zjq0860; Last   Rx:98Kfo8753 Ordered   2  Atorvastatin Calcium 40 MG Oral Tablet; TAKE 1 TABLET BY MOUTH EVERY DAY   CHOLESTEROL PILL; Therapy: 36YVD3650 to [phone])  Requested for: 39SJO8387; Last   Rx:65Fpr2737 Ordered   3  Famotidine 40 MG Oral Tablet (Pepcid); TAKE 1 TABLET TWICE DAILY  BY MOUTH; Therapy: 38Gdq6986 to (Evaluate:04Jan2017)  Requested for: 34JHZ1137; Last   Rx:98Owg1375 Ordered   4  Levothyroxine Sodium 88 MCG Oral Tablet; take 1 tablet by mouth every day; Therapy: 75UVE6152 to (Evaluate:14Mar2017)  Requested for: 36TNL2015; Last   Rx:14Nov2016 Ordered   5   Lisinopril 10 MG Oral Tablet; TAKE 1 TABLET DAILY AS DIRECTED  BLOOD   PRESSURE; Therapy: 96ROO5421 to (Debby Diaz)  Requested for: 54ZKX5637; Last   Rx:07Oct2016 Ordered   6  TraMADol HCl - 50 MG Oral Tablet; TAKE 1 TABLET EVERY 6 HOURS AS NEEDED FOR   PAIN;   Therapy: 49SEC3599 to (Evaluate:28Vud0652); Last Rx:79Fra6412 Ordered   7  Triamcinolone Acetonide 0 1 % External Ointment; APPLY AND GENTLY MASSAGE   INTO AFFECTED AREA(S) TWICE DAILY; Therapy: 12WHV8586 to (Last Rx:14Nov2016)  Requested for: 06ZUY2210 Ordered    Allergies    1   No Known Drug Allergies    Signatures   Electronically signed by : Candance Notch, ; Dec  8 2016  3:40PM EST                       (Author)

## 2018-01-13 NOTE — MISCELLANEOUS
Message   Recorded as Task   Date: 12/19/2016 09:15 AM, Created By: Valeriy Tan   Task Name: Go to Result   Assigned To: [facility],Team   Regarding Patient: Shae Arizmendi, Status: Active   Comment:    Valeriy Tan - 19 Dec 2016 9:15 AM     TASK CREATED  Rectal polyp is a tubular adenoma  I would recommend 3-5 year follow-up  Mary Funes - 20 Dec 2016 9:23 AM     TASK EDITED  Called pt with results and informed her that the polyp that was removed was  tubular adenoma which is negative for cancer  She will need a follow-up colonoscopy in 3-5 years to monitor for any new polyps  A reminder letter will be sent in the mail shortly but she will need to call in 3-5 years to schedule an appt  Pt verbalized understanding and thanked me  No questions or concerns  Active Problems    1  Abnormal mammogram (793 80) (R92 8)   2  Adenocarcinoma of breast stage I, left (174 9) (C50 912)   3  Backache (724 5) (M54 9)   4  Benign essential hypertension (401 1) (I10)   5  Breast cancer (174 9) (C50 919)   6  Cervical cancer screening (V76 2) (Z12 4)   7  Chest pain (786 50) (R07 9)   8  Colon cancer screening (V76 51) (Z12 11)   9  Depression (311) (F32 9)   10  Eczema (692 9) (L30 9)   11  Encounter for routine gynecological examination with Papanicolaou smear of cervix    (V72 31,V76 2) (Z01 419)   12  Fever (780 60) (R50 9)   13  Gastritis (535 50) (K29 70)   14  Hot flashes (782 62) (R23 2)   15  Hyperlipidemia (272 4) (E78 5)   16  Hypothyroidism (244 9) (E03 9)   17  Lobular breast cancer, right (174 9) (C50 911)   18  Lymphedema (457 1) (I89 0)   19  Lymphedema (457 1) (I89 0)   20  Malignant neoplasm of left breast (174 9) (C50 912)   21  Need for immunization against influenza (V04 81) (Z23)   22  Nipple discharge (611 79) (N64 52)   23  Obesity (278 00) (E66 9)   24  Scar conditions/skin fibrosis (709 2) (L90 5)   25  Seroma (998 13) (T14 8)   26   Viral infection (138 99) (B34 9)    Current Meds   1  Anastrozole 1 MG Oral Tablet; TAKE 1 TABLET DAILY  BY MOUTH; Therapy: 16CYD0968 to (Evaluate:18Feb2017)  Requested for: 88Olu2527; Last   Rx:94Hqk0229 Ordered   2  Atorvastatin Calcium 40 MG Oral Tablet; TAKE 1 TABLET BY MOUTH EVERY DAY   CHOLESTEROL PILL; Therapy: 59JAZ8588 to (Clista Marija)  Requested for: 89ZNC8070; Last   Rx:07Oct2016 Ordered   3  Famotidine 40 MG Oral Tablet (Pepcid); TAKE 1 TABLET TWICE DAILY  BY MOUTH; Therapy: 19Izf7569 to (Evaluate:04Jan2017)  Requested for: 06UJN3362; Last   Rx:37Fwf8602 Ordered   4  Levothyroxine Sodium 88 MCG Oral Tablet; take 1 tablet by mouth every day; Therapy: 20FHU2695 to (Evaluate:14Mar2017)  Requested for: 51TFJ2722; Last   Rx:14Nov2016 Ordered   5  Lisinopril 10 MG Oral Tablet; TAKE 1 TABLET DAILY AS DIRECTED  BLOOD   PRESSURE; Therapy: 35IFN8375 to (Demetria Dutton)  Requested for: 54WZK5166; Last   Rx:07Oct2016 Ordered   6  TraMADol HCl - 50 MG Oral Tablet; TAKE 1 TABLET EVERY 6 HOURS AS NEEDED FOR   PAIN;   Therapy: 10QAV9749 to (Evaluate:31May2016); Last Rx:54Pby3394 Ordered   7  Triamcinolone Acetonide 0 1 % External Ointment; APPLY AND GENTLY MASSAGE   INTO AFFECTED AREA(S) TWICE DAILY; Therapy: 79OVM5332 to (Last Rx:14Nov2016)  Requested for: 65TOA7002 Ordered    Allergies    1   No Known Drug Allergies    Signatures   Electronically signed by : Isidoro Mari, ; Dec 20 2016  9:24AM EST                       (Author)

## 2018-01-13 NOTE — MISCELLANEOUS
Message  Mailed colono letter to patients home address  Tasked PCPs office  {Updated pre-visit planning to reflect 3-5 year follow up  }      Active Problems    1  Abnormal mammogram (793 80) (R92 8)   2  Adenocarcinoma of breast stage I, left (174 9) (C50 912)   3  Backache (724 5) (M54 9)   4  Benign essential hypertension (401 1) (I10)   5  Breast cancer (174 9) (C50 919)   6  Cervical cancer screening (V76 2) (Z12 4)   7  Chest pain (786 50) (R07 9)   8  Colon cancer screening (V76 51) (Z12 11)   9  Depression (311) (F32 9)   10  Eczema (692 9) (L30 9)   11  Encounter for routine gynecological examination with Papanicolaou smear of cervix    (V72 31,V76 2) (Z01 419)   12  Fever (780 60) (R50 9)   13  Gastritis (535 50) (K29 70)   14  Hot flashes (782 62) (R23 2)   15  Hyperlipidemia (272 4) (E78 5)   16  Hypothyroidism (244 9) (E03 9)   17  Lobular breast cancer, right (174 9) (C50 911)   18  Lymphedema (457 1) (I89 0)   19  Lymphedema (457 1) (I89 0)   20  Malignant neoplasm of left breast (174 9) (C50 912)   21  Need for immunization against influenza (V04 81) (Z23)   22  Nipple discharge (611 79) (N64 52)   23  Obesity (278 00) (E66 9)   24  Scar conditions/skin fibrosis (709 2) (L90 5)   25  Seroma (998 13) (T14 8)   26  Viral infection (079 99) (B34 9)    Current Meds   1  Anastrozole 1 MG Oral Tablet; TAKE 1 TABLET DAILY  BY MOUTH; Therapy: 51XYA3593 to (Evaluate:98Mgu3521)  Requested for: 29Jlp2452; Last   Rx:61Gzk5138 Ordered   2  Atorvastatin Calcium 40 MG Oral Tablet; TAKE 1 TABLET BY MOUTH EVERY DAY   CHOLESTEROL PILL; Therapy: 30FRV7834 to (Evaluate:23Mar2017)  Requested for: 59Jtr6368; Last   Rx:75Qlr4258 Ordered   3  Famotidine 40 MG Oral Tablet (Pepcid); TAKE 1 TABLET TWICE DAILY  BY MOUTH; Therapy: 80Rkz6336 to (Evaluate:28Mar2017)  Requested for: 28Dec2016; Last   Rx:28Dec2016 Ordered   4  Levothyroxine Sodium 88 MCG Oral Tablet; take 1 tablet by mouth every day;    Therapy: 36CFI0314 to

## 2018-01-13 NOTE — RESULT NOTES
Verified Results  (1) T4, FREE 72PPS7729 09:33AM Alric Backer Order Number: [id number]  TW Order Number: [id number]     Test Name Result Flag Reference   T4,FREE 1 59 ng/dL H 0 76-1 46       Plan  Hypothyroidism    · Levothyroxine Sodium 100 MCG Oral Tablet   · Levothyroxine Sodium 88 MCG Oral Tablet; take one tablet by mouth every day    Discussion/Summary   Need to decrease thyroid medication to 88mcg  Stop levothyroxine 100mcg  Begin levothyroxine 88mcg  Follow up as scheduled       Signatures   Electronically signed by : FAWAD Gilbert; Jun 1 2016  8:15PM EST                       (Author)

## 2018-01-14 VITALS
SYSTOLIC BLOOD PRESSURE: 118 MMHG | WEIGHT: 162.5 LBS | BODY MASS INDEX: 28.79 KG/M2 | DIASTOLIC BLOOD PRESSURE: 68 MMHG | HEART RATE: 94 BPM | TEMPERATURE: 97.5 F | RESPIRATION RATE: 18 BRPM | HEIGHT: 63 IN

## 2018-01-14 VITALS
BODY MASS INDEX: 29.31 KG/M2 | WEIGHT: 165.44 LBS | HEIGHT: 63 IN | SYSTOLIC BLOOD PRESSURE: 118 MMHG | HEART RATE: 92 BPM | TEMPERATURE: 97.1 F | DIASTOLIC BLOOD PRESSURE: 70 MMHG | RESPIRATION RATE: 18 BRPM

## 2018-01-14 NOTE — RESULT NOTES
Verified Results  (1) TSH 21Mar2017 09:48AM [facility] Order Number: [id number]     Test Name Result Flag Reference   TSH 0 522 uIU/mL  0 358-3 740   - Patient Instructions: This bloodwork is non-fasting  Please drink two glasses of water morning of bloodwork  - Patient Instructions: This is a fasting blood test  Water, black tea or black coffee only after 9:00pm the night before test Drink 2 glasses of water the morning of test - Patient Instructions: This bloodwork is non-fasting  Please drink two glasses of   water morning of bloodwork  Patients undergoing fluorescein dye angiography may retain small amounts of fluorescein in the body for 48-72 hours post procedure  Samples containing fluorescein can produce falsely depressed TSH values  If the patient had this procedure,a specimen should be resubmitted post fluorescein clearance  The recommended reference ranges for TSH during pregnancy are as follows:  First trimester 0 1 to 2 5 uIU/mL  Second trimester  0 2 to 3 0 uIU/mL  Third trimester 0 3 to 3 0 uIU/m     (1) T4, FREE 21Mar2017 09:48AM [facility] Order Number: [id number]     Test Name Result Flag Reference   T4,FREE 1 41 ng/dL  0 76-1 46     (1) LIPID PANEL FASTING W DIRECT LDL REFLEX 35SLJ3715 09:48AM Riley Carbajal Order Number: [id number]     Test Name Result Flag Reference   CHOLESTEROL 154 mg/dL     LDL CHOLESTEROL CALCULATED 69 mg/dL  0-100   - Patient Instructions: This is a fasting blood test  Water, black tea or black coffee only after 9:00pm the night before test   Drink 2 glasses of water the morning of test     - Patient Instructions: This is a fasting blood test  Water, black tea or black coffee only after 9:00pm the night before test Drink 2 glasses of water the morning of test - Patient Instructions: This bloodwork is non-fasting  Please drink two glasses of   water morning of bloodwork    Triglyceride:         Normal <150 mg/dl       Borderline High    150-199 mg/dl       High               200-499 mg/dl       Very High          >499 mg/dl  Cholesterol:         Desirable        <200 mg/dl      Borderline High  200-239 mg/dl      High             >239 mg/dl  HDL Cholesterol:        High    >59 mg/dL      Low     <41 mg/dL  LDL Cholesterol:        Optimal          <100 mg/dl        Near Optimal     100-129 mg/dl        Above Optimal          Borderline High   130-159 mg/dl          High              160-189 mg/dl          Very High        >189 mg/dl  LDL CALCULATED:    This screening LDL is a calculated result  It does not have the accuracy of the Direct Measured LDL in the monitoring of patients with hyperlipidemia and/or statin therapy  Direct Measure LDL (CJT584) must be ordered separately in these patients  TRIGLYCERIDES 69 mg/dL  <=150   Specimen collection should occur prior to N-Acetylcysteine or Metamizole administration due to the potential for falsely depressed results  HDL,DIRECT 71 mg/dL H 40-60   Specimen collection should occur prior to Metamizole administration due to the potential for falsely depressed results  (1) CBC/PLT/DIFF 49HFQ2487 09:48AM Riley Carbajal Order Number: [id number]     Test Name Result Flag Reference   WBC COUNT 5 05 Thousand/uL  4 31-10 16   RBC COUNT 4 14 Million/uL  3 81-5 12   HEMOGLOBIN 11 5 g/dL  11 5-15 4   HEMATOCRIT 36 0 %  34 8-46  1   MCV 87 fL  82-98   MCH 27 8 pg  26 8-34 3   MCHC 31 9 g/dL  31 4-37 4   RDW 13 2 %  11 6-15 1   MPV 9 7 fL  8 9-12 7   PLATELET COUNT 144 Thousands/uL  149-390   NEUTROPHILS RELATIVE PERCENT 46 %  43-75   LYMPHOCYTES RELATIVE PERCENT 42 %  14-44   MONOCYTES RELATIVE PERCENT 9 %  4-12   EOSINOPHILS RELATIVE PERCENT 2 %  0-6   BASOPHILS RELATIVE PERCENT 1 %  0-1   NEUTROPHILS ABSOLUTE COUNT 2 37 Thousands/? ??L  1 85-7 62   LYMPHOCYTES ABSOLUTE COUNT 2 11 Thousands/? ??L  0 60-4 47   MONOCYTES ABSOLUTE COUNT 0 45 Thousand/? ? ? L 0  17-1 22   EOSINOPHILS ABSOLUTE COUNT 0 09 Thousand/? ??L  0 00-0 61   BASOPHILS ABSOLUTE COUNT 0 03 Thousands/? ??L  0 00-0 10   - Patient Instructions: This bloodwork is non-fasting  Please drink two glasses of water morning of bloodwork  - Patient Instructions: This bloodwork is non-fasting  Please drink two glasses of water morning of bloodwork  (1) COMPREHENSIVE METABOLIC PANEL 03RTB6409 70:58SJ Levie Carrel    Order Number: [id number]     Test Name Result Flag Reference   SODIUM 141 mmol/L  136-145   POTASSIUM 3 8 mmol/L  3 5-5 3   CHLORIDE 104 mmol/L  100-108   CARBON DIOXIDE 29 mmol/L  21-32   ANION GAP (CALC) 8 mmol/L  4-13   BLOOD UREA NITROGEN 23 mg/dL  5-25   CREATININE 1 07 mg/dL  0 60-1 30   Standardized to IDMS reference method   CALCIUM 9 1 mg/dL  8 3-10 1   BILI, TOTAL 0 32 mg/dL  0 20-1 00   ALK PHOSPHATAS 48 U/L     ALT (SGPT) 22 U/L  12-78   AST(SGOT) 15 U/L  5-45   ALBUMIN 3 4 g/dL L 3 5-5 0   TOTAL PROTEIN 7 1 g/dL  6 4-8 2   eGFR Non-African American 53 0 ml/min/1 73sq m     - Patient Instructions: This is a fasting blood test  Water, black tea or black coffee only after 9:00pm the night before test Drink 2 glasses of water the morning of test - Patient Instructions: This bloodwork is non-fasting  Please drink two glasses of   water morning of bloodwork  National Kidney Disease Education Program recommendations are as follows:  GFR calculation is accurate only with a steady state creatinine  Chronic Kidney disease less than 60 ml/min/1 73 sq  meters  Kidney failure less than 15 ml/min/1 73 sq  meters     GLUCOSE FASTING 109 mg/dL H 65-99       Plan  Benign essential hypertension    · Lisinopril 10 MG Oral Tablet; TAKE 1 TABLET DAILY AS DIRECTED  BLOOD  PRESSURE  Hyperlipidemia    · Atorvastatin Calcium 40 MG Oral Tablet; TAKE 1 TABLET BY MOUTH EVERY  DAY CHOLESTEROL PILL  Hypothyroidism    · Levothyroxine Sodium 88 MCG Oral Tablet; take 1 tablet by mouth every day    Discussion/Summary    Lab test results look good  We will continue the same medications  Please schedule a follow up visit in early May, before medication refills run out       Signatures   Electronically signed by : Gavi Aparicio, [facility]; Mar 24 2017  9:25AM EST                       (Author)

## 2018-01-15 NOTE — RESULT NOTES
Discussion/Summary   Patient has a follow up visit to review abnormal lab test results on 9/22  Verified Results  (1) T4, FREE 57Npf6356 09:28AM [facility] Order Number: [id number]     Test Name Result Flag Reference   T4,FREE 1 36 ng/dL  0 76-1 46   Specimen collection should occur prior to Sulfasalazine administration due to the potential for falsely elevated results  (1) TSH 62Lzi1827 09:28AM [facility] Order Number: [id number]     Test Name Result Flag Reference   TSH 0 539 uIU/mL  0 358-3 740   Patients undergoing fluorescein dye angiography may retain small amounts of fluorescein in the body for 48-72 hours post procedure  Samples containing fluorescein can produce falsely depressed TSH values  If the patient had this procedure,a specimen should be resubmitted post fluorescein clearance  The recommended reference ranges for TSH during pregnancy are as follows:  First trimester 0 1 to 2 5 uIU/mL  Second trimester  0 2 to 3 0 uIU/mL  Third trimester 0 3 to 3 0 uIU/m     (1) COMPREHENSIVE METABOLIC PANEL 76ZMU1847 96:75KC [facility] Order Number: [id number]     Test Name Result Flag Reference   SODIUM 141 mmol/L  136-145   POTASSIUM 4 0 mmol/L  3 5-5 3   CHLORIDE 105 mmol/L  100-108   CARBON DIOXIDE 30 mmol/L  21-32   ANION GAP (CALC) 6 mmol/L  4-13   BLOOD UREA NITROGEN 19 mg/dL  5-25   CREATININE 1 07 mg/dL  0 60-1 30   Standardized to IDMS reference method   CALCIUM 9 5 mg/dL  8 3-10 1   BILI, TOTAL 0 35 mg/dL  0 20-1 00   ALK PHOSPHATAS 53 U/L     ALT (SGPT) 24 U/L  12-78   Specimen collection should occur prior to Sulfasalazine administration due to the potential for falsely depressed results  AST(SGOT) 18 U/L  5-45   Specimen collection should occur prior to Sulfasalazine administration due to the potential for falsely depressed results     ALBUMIN 3 8 g/dL  3 5-5 0   TOTAL PROTEIN 7 9 g/dL  6 4-8 2   eGFR 58 ml/min/1 73sq m National Kidney Disease Education Program recommendations are as follows:  GFR calculation is accurate only with a steady state creatinine  Chronic Kidney disease less than 60 ml/min/1 73 sq  meters  Kidney failure less than 15 ml/min/1 73 sq  meters  GLUCOSE FASTING 100 mg/dL H 65-99   Specimen collection should occur prior to Sulfasalazine administration due to the potential for falsely depressed results  Specimen collection should occur prior to Sulfapyridine administration due to the potential for falsely elevated results  (1) LIPID PANEL FASTING W DIRECT LDL REFLEX 00Zhb0143 09:28AM Wilian Wheeler Order Number: [id number]     Test Name Result Flag Reference   CHOLESTEROL 161 mg/dL     LDL CHOLESTEROL CALCULATED 78 mg/dL  0-100   Triglyceride:        Normal ??? ??? ??? ??? ??? ??? ??? <150 mg/dl   ??? ??? ???Borderline High ??? ??? 150-199 mg/dl   ??? ??? ? ?? High ??? ??? ??? ??? ??? ??? ??? 200-499 mg/dl   ??? ??? ? ??Very High ??? ??? ??? ??? ??? >499 mg/dl      Cholesterol:       Desirable ??? ??? ??? ??? <200 mg/dl   ??? ??? Borderline High ??? 200-239 mg/dl   ??? ??? High ??? ??? ??? ??? ??? ??? >239 mg/dl      HDL Cholesterol:       High ??? ???>59 mg/dL   ??? ??? Low ??? ??? <41 mg/dL      HDL Cholesterol:       High ??? ???>59 mg/dL   ??? ??? Low ??? ??? <41 mg/dL      This screening LDL is a calculated result  It does not have the accuracy of the Direct Measured LDL in the monitoring of patients with hyperlipidemia and/or statin therapy  Direct Measure LDL (XTJ617) must be ordered separately in these patients  TRIGLYCERIDES 77 mg/dL  <=150   Specimen collection should occur prior to N-Acetylcysteine or Metamizole administration due to the potential for falsely depressed results  HDL,DIRECT 68 mg/dL H 40-60   Specimen collection should occur prior to Metamizole administration due to the potential for falsley depressed results       (1) HEMOGLOBIN A1C 94Ehn7617 09:28AM Nevin Saadjane Order Number: [id number]     Test Name Result Flag Reference   HEMOGLOBIN A1C 6 4 % H 4 2-6 3   EST  AVG   GLUCOSE 137 mg/dl         Signatures   Electronically signed by : FAWAD Almodovar; Sep 16 2017 10:36PM EST                       (Author)

## 2018-01-16 NOTE — PROGRESS NOTES
Discussion/Summary  Social Work-Discussion Summary [facility]:   MSW called patient today in regard to her need for a lymphedema sleeve  Patient stated she did need a new sleeve  MSW called [facility] in [place], [place]  They were aware of patient she had received a sleeve 2/16  They stated patient needs to come in for measurements  Patient stated she had no transportation  She shared she is going to Physical Therapy on 6/15/16 and the therapist can take her measurements at her Physical therapy appointment  MSW left message with [facility] that patient will get sleeve measurement at PT  MSW will call PT office for measurement and fax them to  Sena Gomez will fax invoice to MSW's office in order to have review cost for compassion care fund  Upon receiving payment   Patricia will mail sleeve to patient        Signatures   Electronically signed by : RERE Kim; Jun 6 2017 11:33AM EST                       (Author)

## 2018-01-17 NOTE — MISCELLANEOUS
Message  Patient called office inquiring about a phone call she received from her insurance for medical supplies  Patient is trying to order a two sleeves  Patient said her insurance company called one of her doctors office in regards of the sleeves but never told which doctors office they spoke to  Unfortunately our office does not have any notation of contact with patient's insurance company  Patient was also wondering where she got her sleeve previously  Patient has a hard copy of her script  Patient will call insurance company to ask about where doctor's office they spoke to about sleeve  Patient is aware to call the office if she has any more questions or concerns  Active Problems    1  Abnormal blood sugar (790 29) (R73 09)   2  Abnormal mammogram (793 80) (R92 8)   3  Adenocarcinoma of breast stage I, left (174 9) (C50 912)   4  Arthralgia of multiple joints (719 49) (M25 50)   5  Backache (724 5) (M54 9)   6  Benign essential hypertension (401 1) (I10)   7  Breast cancer (174 9) (C50 919)   8  Cervical cancer screening (V76 2) (Z12 4)   9  Chest pain (786 50) (R07 9)   10  Colon cancer screening (V76 51) (Z12 11)   11  Depression (311) (F32 9)   12  Depression screening (V79 0) (Z13 89)   13  Eczema (692 9) (L30 9)   14  Encounter for routine gynecological examination with Papanicolaou smear of cervix    (V72 31,V76 2) (Z01 419)   15  Gastritis (535 50) (K29 70)   16  Hot flashes (782 62) (R23 2)   17  Hyperlipidemia (272 4) (E78 5)   18  Hypothyroidism (244 9) (E03 9)   19  Lobular breast cancer, right (174 9) (C50 911)   20  Lymphedema (457 1) (I89 0)   21  Lymphedema (457 1) (I89 0)   22  Malignant neoplasm of left breast (174 9) (C50 912)   23  Need for immunization against influenza (V04 81) (Z23)   24  Nipple discharge (611 79) (N64 52)   25  Obesity (278 00) (E66 9)   26  Scar conditions/skin fibrosis (709 2) (L90 5)   27  Seroma (998 13)   28  Skin lesion (709 9) (L98 9)   29   Tinea pedis (110 4) (B35 3)   30  Viral infection (079 99) (B34 9)    Current Meds   1  Anastrozole 1 MG Oral Tablet; take 1 tablet by mouth every day; Therapy: 40PXU8934 to (Evaluate:66Dwa4828)  Requested for: 01IAQ2775; Last   Rx:13Jun2017 Ordered   2  Atorvastatin Calcium 40 MG Oral Tablet; TAKE 1 TABLET BY MOUTH EVERY DAY   CHOLESTEROL PILL; Therapy: 18FQX4619 to ([facility])  Requested for: 59HJW4514; Last   Rx:10May2017; Status: ACTIVE - Renewal Denied Ordered   3  Calcium Citrate 1040 MG Oral Tablet; TAKE 1 TABLET DAILY; Therapy: 30XLU9112 to (Evaluate:06Nov2017)  Requested for: 43DAC9818; Last   Rx:10May2017 Ordered   4  Famotidine 40 MG Oral Tablet (Pepcid); TAKE 1 TABLET TWICE DAILY  BY MOUTH; Therapy: 78Idn3334 to (Evaluate:06Nov2017)  Requested for: 26YEP2611; Last   Rx:10May2017 Ordered   5  Ketoconazole 2 % External Cream; APPLY SPARINGLY TO AFFECTED AREA(S) TWICE   DAILY; Therapy: 35QTZ0977 to (Last Rx:10May2017)  Requested for: 94BGY8476 Ordered   6  Levothyroxine Sodium 88 MCG Oral Tablet; take 1 tablet by mouth every day; Therapy: 53IDS4558 to ([facility])  Requested for: 46TSP1611; Last   Rx:10May2017 Ordered   7  Lisinopril 10 MG Oral Tablet; TAKE 1 TABLET DAILY AS DIRECTED  BLOOD   PRESSURE; Therapy: 21LCA9117 to (Evaluate:06Nov2017)  Requested for: 83DXT9380; Last   Rx:10May2017 Ordered   8  Naproxen 250 MG Oral Tablet; Take one to two tablets twice daily as needed for pain; Therapy: 43RRX8500 to (Evaluate:07Sep2017)  Requested for: 28KHY1286; Last   Rx:10May2017 Ordered    Allergies    1   No Known Drug Allergies    Signatures   Electronically signed by : Mami Corea, ; Sep 14 2017  3:13PM EST                       (Author)

## 2018-01-22 VITALS
OXYGEN SATURATION: 96 % | RESPIRATION RATE: 18 BRPM | BODY MASS INDEX: 29.45 KG/M2 | HEIGHT: 63 IN | HEART RATE: 76 BPM | DIASTOLIC BLOOD PRESSURE: 78 MMHG | SYSTOLIC BLOOD PRESSURE: 126 MMHG | TEMPERATURE: 97.8 F | WEIGHT: 166.19 LBS

## 2018-01-23 VITALS
OXYGEN SATURATION: 98 % | DIASTOLIC BLOOD PRESSURE: 74 MMHG | HEART RATE: 86 BPM | RESPIRATION RATE: 18 BRPM | BODY MASS INDEX: 29.59 KG/M2 | HEIGHT: 63 IN | WEIGHT: 167 LBS | TEMPERATURE: 97.6 F | SYSTOLIC BLOOD PRESSURE: 124 MMHG

## 2018-01-23 NOTE — RESULT NOTES
Discussion/Summary   Lab results are normal   Continue low cholesterol, low fat diet  Continue low carbohydrates including decreasing pasta, rice, potatoes, bread and sweets  Continue fruits, vegetables, lean meats, whole grains  Follow up in 3 months  Verified Results  (1) LIPID PANEL FASTING W DIRECT LDL REFLEX [id number] 11:33AM Hakan Perez    Order Number: [id number]     Test Name Result Flag Reference   CHOLESTEROL 157 mg/dL     LDL CHOLESTEROL CALCULATED 63 mg/dL  0-100   Triglyceride:        Normal <150 mg/dl   Borderline High 150-199 mg/dl   High 200-499 mg/dl   Very High >499 mg/dl      Cholesterol:       Desirable <200 mg/dl    Borderline High 200-239 mg/dl    High >239 mg/dl      HDL Cholesterol:       High>59 mg/dL    Low <41 mg/dL      HDL Cholesterol:       High>59 mg/dL    Low <41 mg/dL      This screening LDL is a calculated result  It does not have the accuracy of the Direct Measured LDL in the monitoring of patients with hyperlipidemia and/or statin therapy  Direct Measure LDL (QLT682) must be ordered separately in these patients  TRIGLYCERIDES 111 mg/dL  <=150   Specimen collection should occur prior to N-Acetylcysteine or Metamizole administration due to the potential for falsely depressed results  HDL,DIRECT 72 mg/dL H 40-60   Specimen collection should occur prior to Metamizole administration due to the potential for falsley depressed results       (1) COMPREHENSIVE METABOLIC PANEL [id number] 12:15OR [facility] Order Number: [id number]     Test Name Result Flag Reference   SODIUM 139 mmol/L  136-145   POTASSIUM 3 8 mmol/L  3 5-5 3   CHLORIDE 105 mmol/L  100-108   CARBON DIOXIDE 29 mmol/L  21-32   ANION GAP (CALC) 5 mmol/L  4-13   BLOOD UREA NITROGEN 15 mg/dL  5-25   CREATININE 0 93 mg/dL  0 60-1 30   Standardized to IDMS reference method   CALCIUM 9 7 mg/dL  8 3-10 1   BILI, TOTAL 0 41 mg/dL  0 20-1 00   ALK PHOSPHATAS 56 U/L     ALT (SGPT) 27 U/L  12-78   Specimen collection should occur prior to Sulfasalazine and/or Sulfapyridine administration due to the potential for falsely depressed results  AST(SGOT) 14 U/L  5-45   Specimen collection should occur prior to Sulfasalazine administration due to the potential for falsely depressed results  ALBUMIN 3 6 g/dL  3 5-5 0   TOTAL PROTEIN 7 9 g/dL  6 4-8 2   eGFR 68 ml/min/1 73sq m     National Kidney Disease Education Program recommendations are as follows:  GFR calculation is accurate only with a steady state creatinine  Chronic Kidney disease less than 60 ml/min/1 73 sq  meters  Kidney failure less than 15 ml/min/1 73 sq  meters  GLUCOSE FASTING 93 mg/dL  65-99   Specimen collection should occur prior to Sulfasalazine administration due to the potential for falsely depressed results  Specimen collection should occur prior to Sulfapyridine administration due to the potential for falsely elevated results         Plan  Benign essential hypertension    · Lisinopril 10 MG Oral Tablet; TAKE 1 TABLET DAILY AS DIRECTED  BLOOD  PRESSURE  Cervical cancer screening    · *2 - [address] [facility] [facility]  *  Status: Active  Requested for:  [id number]  Care Summary provided  : Yes  Health Maintenance    · Calcium Citrate 1040 MG Oral Tablet; TAKE 1 TABLET DAILY   · Stop: Influenza  Hyperlipidemia    · Atorvastatin Calcium 40 MG Oral Tablet; TAKE 1 TABLET BY MOUTH EVERY  DAY CHOLESTEROL PILL   · (1) COMPREHENSIVE METABOLIC PANEL; Status:Complete;   Done: [id number]  11:33AM   · (1) LIPID PANEL FASTING W DIRECT LDL REFLEX; Status:Complete;   Done:  [id number] 11:33AM  Hypothyroidism    · Levothyroxine Sodium 88 MCG Oral Tablet; take 1 tablet by mouth every day  Need for immunization against influenza    · Stop: Influenza  Tinea pedis    · Ketoconazole 2 % External Cream; APPLY SPARINGLY TO AFFECTED  AREA(S) TWICE DAILY    Signatures   Electronically signed by : FAWAD Carlos; Dec 13 2017  4:45AM EST                       (Author)

## 2018-01-24 VITALS
WEIGHT: 165 LBS | BODY MASS INDEX: 29.23 KG/M2 | SYSTOLIC BLOOD PRESSURE: 154 MMHG | DIASTOLIC BLOOD PRESSURE: 82 MMHG | HEART RATE: 92 BPM | HEIGHT: 63 IN

## 2018-01-24 VITALS — DIASTOLIC BLOOD PRESSURE: 81 MMHG | SYSTOLIC BLOOD PRESSURE: 153 MMHG

## 2018-02-19 ENCOUNTER — OFFICE VISIT (OUTPATIENT)
Dept: HEMATOLOGY ONCOLOGY | Facility: CLINIC | Age: 58
End: 2018-02-19
Payer: COMMERCIAL

## 2018-02-19 VITALS
BODY MASS INDEX: 30.12 KG/M2 | SYSTOLIC BLOOD PRESSURE: 116 MMHG | OXYGEN SATURATION: 99 % | HEART RATE: 78 BPM | WEIGHT: 170 LBS | RESPIRATION RATE: 16 BRPM | HEIGHT: 63 IN | TEMPERATURE: 98 F | DIASTOLIC BLOOD PRESSURE: 80 MMHG

## 2018-02-19 DIAGNOSIS — Z17.0 BILATERAL MALIGNANT NEOPLASM OF BREAST IN FEMALE, ESTROGEN RECEPTOR POSITIVE, UNSPECIFIED SITE OF BREAST (HCC): Primary | ICD-10-CM

## 2018-02-19 DIAGNOSIS — C50.912 BILATERAL MALIGNANT NEOPLASM OF BREAST IN FEMALE, ESTROGEN RECEPTOR POSITIVE, UNSPECIFIED SITE OF BREAST (HCC): Primary | ICD-10-CM

## 2018-02-19 DIAGNOSIS — C50.911 BILATERAL MALIGNANT NEOPLASM OF BREAST IN FEMALE, ESTROGEN RECEPTOR POSITIVE, UNSPECIFIED SITE OF BREAST (HCC): Primary | ICD-10-CM

## 2018-02-19 PROCEDURE — 99214 OFFICE O/P EST MOD 30 MIN: CPT | Performed by: INTERNAL MEDICINE

## 2018-02-19 RX ORDER — ANASTROZOLE 1 MG/1
1 TABLET ORAL DAILY
Qty: 90 TABLET | Refills: 1 | Status: SHIPPED | OUTPATIENT
Start: 2018-02-19 | End: 2018-06-11 | Stop reason: SDUPTHER

## 2018-02-19 NOTE — PROGRESS NOTES
Hematology / Oncology Outpatient Follow Up Note    Edel Jacobsen 62 y o  female [facility]:[id number] QDD:[id number]         Date:  2/19/2018    Assessment / Plan:  A 62year old postmenopausal with history of locally advanced left breast cancer, grade 3, triple-negative disease  She had complete pathological response to the neoadjuvant chemotherapy followed by lumpectomy and lymph node dissection resulting in the RADHA in 2010  She was found to be negative for BRCA gene mutation  In September 2015, she was diagnosed with stage IIB right breast cancer with invasive lobular histology, grade 1, ER/SD positive HER-2 negative disease  She underwent mastectomy with lymph node dissection resulting in RADHA  She had immediate reconstruction  Her tumor had Oncotype DX recurrence score of 21  Therefore, adjuvant chemotherapy was not strongly indicated  She has been on adjuvant hormonal therapy with anastrozole with minimal side effects  She has no evidence recurrent disease, clinically  I recommended her to continue with anastrozole 1 mg once a day  She is going to have mammography in May 2018  Regarding the lymphedema in pain, I recommended her to take naproxen as needed basis  She is in agreement with my recommendation  I will see her again in 6 months for routine follow-up  Subjective:     HPI:          Interval History:  A 62year old postmenopausal woman with locally advanced left breast cancer with triple-negative disease diagnosed in November 2010  She underwent neoadjuvant chemotherapy with a c  followed by paclitaxel resulted in complete pathological response  She underwent lumpectomy with axillary lymph node dissection followed by adjuvant radiation treatment  She underwent genetic testing which was negative for BRCA gene mutation  She has no evidence of recurrence of triple-negative breast cancer, to date   She was diagnosed with stage IIB right breast cancer, grade 1, ER/SD positive HER-2 negative disease in 2015  This was invasive lobular carcinoma  Her tumor had Oncotype DX score of 21  Since October 2015, she has been on adjuvant hormonal therapy with anastrozole  She came in today for follow-up  She has chronic lymphedema in the left upper extremities  She has chronic pain in the left arm or which NSAIDs give her some relief  Otherwise, she feels well  She has no hot flashes or musculoskeletal symptoms  Her weight is stable  She has no respiratory symptoms such as cough, sputum production or shortness of breast   Her performance status is normal       Objective:     Primary Diagnosis:    1  Locally advanced left breast cancer  Clinical T3, pathological at least N1, M0 disease with ER/RI-negative, HER2-negative disease  Diagnosed in November 2010  2  negative for BRCA gene mutation  3  Right breast cancer, stage IIB(pT2, pN1a, M0)  Grade 1  Invasive lobular histology  ER/RI positive, HER-2 negative disease  Oncotype DX recurrence score of 21  Diagnosed in September 2015  Cancer Staging:  No matching staging information was found for the patient  Previous Hematologic/ Oncologic Treatment:     1  Neoadjuvant chemotherapy with dose-dense AC x4 followed by weekly paclitaxel x12    2  Whole-breast radiation therapy completed in September of 2011  Current Hematologic/ Oncologic Treatment:      Adjuvant hormonal therapy with anastrozole since October 2015  Disease Status:     1  Complete pathological response to neoadjuvant chemotherapy  2  RADHA status post left lumpectomy with axillary lymph node dissection  3  RADHA, status post right mastectomy with sentinel lymph node biopsy with reconstruction in September 2015  Test Results:    Pathology:    Right mastectomy specimen showed 2 cm of invasive lobular carcinoma, grade 1, ER/RI positive, HER-2 negative disease  One sentinel lymph node was positive for metastatic disease  Stage IIB(pT2, pN1a, M0)  Oncotype DX recurrence score 21  Radiology:    Mammography in the left breast in May 2017 was benign  BI-RADS 2  Laboratory:    See below    Physical Exam:      General Appearance:    Alert, oriented        Eyes:    PERRL   Ears:    Normal external ear canals, both ears   Nose:   Nares normal, septum midline   Throat:   Mucosa moist  Pharynx without injection  Neck:   Supple       Lungs:     Clear to auscultation bilaterally   Chest Wall:    No tenderness or deformity    Heart:    Regular rate and rhythm       Abdomen:     Soft, non-tender, bowel sounds +, no organomegaly           Extremities:   Extremities no cyanosis or edema       Skin:   no rash or icterus  Lymph nodes:   Cervical, supraclavicular, and axillary nodes normal   Neurologic:   CNII-XII intact, normal strength, sensation and reflexes     Throughout          Breast exam:   Right breast is negative  Left breast status post lumpectomy with completely closed wound in left upper quadrant  No palpable mass or nodule in the left breast           ROS: Review of Systems   Cardiovascular:        Left upper extremity lymphedema  All other systems reviewed and are negative  Imaging: No results found  Labs:   Lab Results   Component Value Date    WBC 5 22 05/10/2017    HGB 12 2 05/10/2017    HCT 38 2 05/10/2017    MCV 88 05/10/2017     05/10/2017     Lab Results   Component Value Date     12/11/2017    K 3 8 12/11/2017     12/11/2017    CO2 29 12/11/2017    ANIONGAP 5 12/11/2017    BUN 15 12/11/2017    CREATININE 0 93 12/11/2017    GLUCOSE 104 10/18/2016    GLUF 93 12/11/2017    CALCIUM 9 7 12/11/2017    AST 14 12/11/2017    ALT 27 12/11/2017    ALKPHOS 56 12/11/2017    PROT 7 9 12/11/2017    BILITOT 0 41 12/11/2017    EGFR 68 12/11/2017         Current Medications: Reviewed  Allergies: Reviewed  PMH/FH/SH:  Reviewed      Vital Sign:    Body surface area is 1 8 meters squared      Wt Readings from Last 3 Encounters:   02/19/18 77 1 kg (170 lb) 12/19/17 74 8 kg (165 lb)   12/11/17 75 7 kg (167 lb)        Temp Readings from Last 3 Encounters:   02/19/18 98 °F (36 7 °C)   12/11/17 97 6 °F (36 4 °C) (Tympanic)   09/22/17 97 8 °F (36 6 °C) (Tympanic)        BP Readings from Last 3 Encounters:   02/19/18 116/80   12/19/17 153/81   12/19/17 154/82         Pulse Readings from Last 3 Encounters:   02/19/18 78   12/19/17 92   12/11/17 86     @LASTSAO2(3)@

## 2018-03-14 PROBLEM — F41.9 ANXIETY: Status: ACTIVE | Noted: 2017-09-22

## 2018-03-14 PROBLEM — B35.3 TINEA PEDIS: Status: ACTIVE | Noted: 2017-05-10

## 2018-03-14 PROBLEM — M54.50 CHRONIC LOW BACK PAIN: Status: ACTIVE | Noted: 2017-09-22

## 2018-03-14 PROBLEM — R73.09 ABNORMAL BLOOD SUGAR: Status: ACTIVE | Noted: 2017-05-10

## 2018-03-14 PROBLEM — G89.29 CHRONIC LOW BACK PAIN: Status: ACTIVE | Noted: 2017-09-22

## 2018-03-14 PROBLEM — F40.00 AGORAPHOBIA: Status: ACTIVE | Noted: 2017-09-22

## 2018-03-14 RX ORDER — LEVOTHYROXINE SODIUM 88 UG/1
88 TABLET ORAL DAILY
Refills: 5 | COMMUNITY
Start: 2018-03-01 | End: 2018-03-14

## 2018-03-14 RX ORDER — KETOCONAZOLE 20 MG/G
CREAM TOPICAL 2 TIMES DAILY
Refills: 3 | COMMUNITY
Start: 2018-03-01

## 2018-03-14 RX ORDER — CALCIUM CITRATE 1040MG
1 TABLET ORAL DAILY
COMMUNITY
Start: 2017-05-10

## 2018-03-14 RX ORDER — LEVOTHYROXINE SODIUM 88 UG/1
88 TABLET ORAL DAILY
Refills: 5 | COMMUNITY
Start: 2018-03-01 | End: 2018-03-15 | Stop reason: SDUPTHER

## 2018-03-15 ENCOUNTER — TRANSCRIBE ORDERS (OUTPATIENT)
Dept: LAB | Facility: CLINIC | Age: 58
End: 2018-03-15

## 2018-03-15 ENCOUNTER — OFFICE VISIT (OUTPATIENT)
Dept: FAMILY MEDICINE CLINIC | Facility: CLINIC | Age: 58
End: 2018-03-15
Payer: COMMERCIAL

## 2018-03-15 ENCOUNTER — APPOINTMENT (OUTPATIENT)
Dept: LAB | Facility: CLINIC | Age: 58
End: 2018-03-15
Payer: COMMERCIAL

## 2018-03-15 VITALS
DIASTOLIC BLOOD PRESSURE: 78 MMHG | BODY MASS INDEX: 30.48 KG/M2 | SYSTOLIC BLOOD PRESSURE: 124 MMHG | RESPIRATION RATE: 18 BRPM | HEIGHT: 63 IN | HEART RATE: 88 BPM | WEIGHT: 172 LBS | OXYGEN SATURATION: 96 % | TEMPERATURE: 98.6 F

## 2018-03-15 DIAGNOSIS — J02.9 SORE THROAT: ICD-10-CM

## 2018-03-15 DIAGNOSIS — E78.5 HYPERLIPIDEMIA, UNSPECIFIED HYPERLIPIDEMIA TYPE: ICD-10-CM

## 2018-03-15 DIAGNOSIS — R73.9 HYPERGLYCEMIA: ICD-10-CM

## 2018-03-15 DIAGNOSIS — I10 BENIGN ESSENTIAL HYPERTENSION: ICD-10-CM

## 2018-03-15 DIAGNOSIS — C50.912 MALIGNANT NEOPLASM OF LEFT FEMALE BREAST, UNSPECIFIED ESTROGEN RECEPTOR STATUS, UNSPECIFIED SITE OF BREAST (HCC): ICD-10-CM

## 2018-03-15 DIAGNOSIS — B35.3 TINEA PEDIS OF BOTH FEET: ICD-10-CM

## 2018-03-15 DIAGNOSIS — K29.70 GASTRITIS, PRESENCE OF BLEEDING UNSPECIFIED, UNSPECIFIED CHRONICITY, UNSPECIFIED GASTRITIS TYPE: ICD-10-CM

## 2018-03-15 DIAGNOSIS — E03.9 HYPOTHYROIDISM, UNSPECIFIED TYPE: Primary | ICD-10-CM

## 2018-03-15 PROBLEM — M25.50 ARTHRALGIA OF MULTIPLE JOINTS: Status: ACTIVE | Noted: 2017-05-10

## 2018-03-15 PROBLEM — R73.09 ABNORMAL BLOOD SUGAR: Status: RESOLVED | Noted: 2017-05-10 | Resolved: 2018-03-15

## 2018-03-15 LAB
EST. AVERAGE GLUCOSE BLD GHB EST-MCNC: 128 MG/DL
HBA1C MFR BLD: 6.1 % (ref 4.2–6.3)
TSH SERPL DL<=0.05 MIU/L-ACNC: 2.58 UIU/ML (ref 0.36–3.74)

## 2018-03-15 PROCEDURE — 84443 ASSAY THYROID STIM HORMONE: CPT | Performed by: PHYSICIAN ASSISTANT

## 2018-03-15 PROCEDURE — 83036 HEMOGLOBIN GLYCOSYLATED A1C: CPT | Performed by: PHYSICIAN ASSISTANT

## 2018-03-15 PROCEDURE — 36415 COLL VENOUS BLD VENIPUNCTURE: CPT | Performed by: PHYSICIAN ASSISTANT

## 2018-03-15 PROCEDURE — 99214 OFFICE O/P EST MOD 30 MIN: CPT | Performed by: PHYSICIAN ASSISTANT

## 2018-03-15 RX ORDER — FAMOTIDINE 40 MG/1
40 TABLET, FILM COATED ORAL
Qty: 30 TABLET | Refills: 2 | Status: SHIPPED | OUTPATIENT
Start: 2018-03-15 | End: 2018-10-22 | Stop reason: SDUPTHER

## 2018-03-15 RX ORDER — LISINOPRIL 10 MG/1
10 TABLET ORAL
Qty: 90 TABLET | Refills: 1 | Status: SHIPPED | OUTPATIENT
Start: 2018-03-15 | End: 2018-07-16 | Stop reason: SDUPTHER

## 2018-03-15 RX ORDER — LEVOTHYROXINE SODIUM 88 UG/1
88 TABLET ORAL DAILY
Qty: 90 TABLET | Refills: 1 | Status: SHIPPED | OUTPATIENT
Start: 2018-03-15 | End: 2018-07-16 | Stop reason: SDUPTHER

## 2018-03-15 RX ORDER — ATORVASTATIN CALCIUM 40 MG/1
40 TABLET, FILM COATED ORAL DAILY
Qty: 90 TABLET | Refills: 1 | Status: SHIPPED | OUTPATIENT
Start: 2018-03-15 | End: 2018-07-16 | Stop reason: SDUPTHER

## 2018-03-15 NOTE — PATIENT INSTRUCTIONS
Continue Famotidine 40 mg at bedtime as needed  Follow-up if throat pain worsens  Most recent hemoglobin A1c done in September 2017 is 6 4  Advised if 6 5 or higher would recommend follow-up to discuss further treatment recommendations  Proceed with hemoglobin A1c and TSH level today  Otherwise routine follow-up in 4 months    Continue follow-up with Oncology as scheduled

## 2018-03-15 NOTE — PROGRESS NOTES
Assessment/Plan:    Patient Instructions   Continue Famotidine 40 mg at bedtime as needed  Follow-up if throat pain worsens  Most recent hemoglobin A1c done in September 2017 is 6 4  Advised if 6 5 or higher would recommend follow-up to discuss further treatment recommendations  Proceed with hemoglobin A1c and TSH level today  Otherwise routine follow-up in 4 months  Continue follow-up with Oncology as scheduled                                          M*Modal software was used to dictate this note  It may contain errors with dictating incorrect words/spelling  Please contact provider directly for any questions  Diagnoses and all orders for this visit:    Hypothyroidism, unspecified type  -     HEMOGLOBIN A1C W/ EAG ESTIMATION  -     TSH, 3rd generation with T4 reflex    Benign essential hypertension  -     HEMOGLOBIN A1C W/ EAG ESTIMATION  -     TSH, 3rd generation with T4 reflex    Hyperlipidemia, unspecified hyperlipidemia type  -     HEMOGLOBIN A1C W/ EAG ESTIMATION  -     TSH, 3rd generation with T4 reflex    Hyperglycemia  -     HEMOGLOBIN A1C W/ EAG ESTIMATION  -     TSH, 3rd generation with T4 reflex    Gastritis, presence of bleeding unspecified, unspecified chronicity, unspecified gastritis type  -     famotidine (PEPCID) 40 MG tablet; Take 1 tablet (40 mg total) by mouth daily at bedtime as needed for indigestion or heartburn (only takes prn )    Tinea pedis of both feet    Other orders  -     Discontinue: levothyroxine 88 mcg tablet; Take 88 mcg by mouth daily  -     Calcium Citrate 1040 MG TABS; Take 1 tablet by mouth daily  -     ketoconazole (NIZORAL) 2 % cream; 2 (two) times a day Apply sparingly to affected area(s)  -     levothyroxine 88 mcg tablet; Take 88 mcg by mouth daily  -     Discontinue: levothyroxine 88 mcg tablet; Take 88 mcg by mouth daily  -     Discontinue: levothyroxine 88 mcg tablet; Take 88 mcg by mouth daily  -     Discontinue: levothyroxine 88 mcg tablet;  Take 88 mcg by mouth daily          Subjective:      Patient ID: Noah Ramirez is a 62 y o  female  Patient presents today for a routine follow-up for hypothyroidism, hyperlipidemia and hypertension  She is compliant with her medications  She does need refills at this time  She has been noticing intermittent throat pain over the past 2 weeks  Denies fever, chills, dysphagia, congestion or cough  She does continue with Oncology for breast cancer  The following portions of the patient's history were reviewed and updated as appropriate:   She  has a past medical history of Anxiety; Breast cancer ([facility] ); Colon polyps; Depression; Disease of thyroid gland; Diverticulosis of colon; Encounter for screening colonoscopy; Gastritis; Hemorrhoids; High cholesterol; Hypertension; Hypothyroidism; Lymphedema; S/P radiation therapy; Thyroid cancer ([facility] ); and Wears glasses  She   Patient Active Problem List    Diagnosis Date Noted    Hyperglycemia 03/15/2018    Chronic low back pain 09/22/2017    Agoraphobia 09/22/2017    Anxiety 09/22/2017    Tinea pedis 05/10/2017    Arthralgia of multiple joints 05/10/2017    Obesity 10/07/2016    Hot flashes 10/07/2016    Gastritis 08/31/2015    Breast cancer ([facility] ) 03/30/2015    Hyperlipidemia 11/11/2014    Malignant neoplasm of left breast ([place] [id number] ) 12/06/2013    Lymphedema 08/21/2013    Benign essential hypertension 06/26/2013    Hypothyroidism 10/06/2012    Depression 10/06/2012     She  has a past surgical history that includes Breast surgery (Bilateral); Colonoscopy; Thyroidectomy; pr colonoscopy flx dx w/collj spec when pfrmd (N/A, 12/13/2016); pr nipple/areola reconstruction (Right, 4/15/2016); pr delay breast pros after breast surg (Right, 1/22/2016); pr removal of breast capsule (Right, 1/22/2016); Breast biopsy; Incisional breast biopsy; Breast lumpectomy (Left); Mastectomy, radical (Right); and Tubal ligation    Her family history includes Cancer in her family; Cervical cancer in her sister; Diabetes in her mother; Liver disease in her brother; No Known Problems in her father; Other in her mother  She  reports that she quit smoking about 8 years ago  She smoked 0 50 packs per day  She has never used smokeless tobacco  She reports that she drinks alcohol  She reports that she does not use drugs  Current Outpatient Prescriptions   Medication Sig Dispense Refill    Calcium Citrate 1040 MG TABS Take 1 tablet by mouth daily      anastrozole (ARIMIDEX) 1 mg tablet Take 1 tablet (1 mg total) by mouth daily 90 tablet 1    atorvastatin (LIPITOR) 40 mg tablet Take 40 mg by mouth daily   buPROPion (WELLBUTRIN XL) 300 mg 24 hr tablet Take 300 mg by mouth daily as needed   famotidine (PEPCID) 40 MG tablet Take 1 tablet (40 mg total) by mouth daily at bedtime as needed for indigestion or heartburn (only takes prn ) 30 tablet 2    ketoconazole (NIZORAL) 2 % cream 2 (two) times a day Apply sparingly to affected area(s)  3    levothyroxine 88 mcg tablet Take 88 mcg by mouth daily  5    lisinopril (ZESTRIL) 10 mg tablet Take 10 mg by mouth daily in the early morning  No current facility-administered medications for this visit  Current Outpatient Prescriptions on File Prior to Visit   Medication Sig    anastrozole (ARIMIDEX) 1 mg tablet Take 1 tablet (1 mg total) by mouth daily    atorvastatin (LIPITOR) 40 mg tablet Take 40 mg by mouth daily   buPROPion (WELLBUTRIN XL) 300 mg 24 hr tablet Take 300 mg by mouth daily as needed   lisinopril (ZESTRIL) 10 mg tablet Take 10 mg by mouth daily in the early morning   [DISCONTINUED] famotidine (PEPCID) 40 MG tablet Take 40 mg by mouth 2 (two) times a day as needed for indigestion or heartburn (only takes prn )       No current facility-administered medications on file prior to visit  She is allergic to morphine and related       Review of Systems   Constitutional: Negative      HENT:        As stated in HPI Respiratory:        As stated in HPI   Cardiovascular: Negative  Gastrointestinal: Negative for blood in stool, diarrhea, nausea and vomiting  She states she does get intermittent epigastric pain for which she takes Famotidine 40 mg as needed which is beneficial          Objective:      /78   Pulse 88   Temp 98 6 °F (37 °C) (Tympanic)   Resp 18   Ht 5' 3" (1 6 m)   Wt 78 kg (172 lb)   SpO2 96%   BMI 30 47 kg/m²          Physical Exam   Constitutional: She appears well-developed and well-nourished  No distress  HENT:   Head: Normocephalic and atraumatic  Right Ear: External ear normal    Left Ear: External ear normal    Mouth/Throat: Oropharynx is clear and moist  No oropharyngeal exudate  Neck: Neck supple  No thyromegaly present  Cardiovascular: Normal rate, regular rhythm and normal heart sounds  Exam reveals no gallop and no friction rub  No murmur heard  Pulmonary/Chest: Effort normal and breath sounds normal  No respiratory distress  She has no wheezes  She has no rales  Abdominal: Soft  Bowel sounds are normal  She exhibits no mass  There is no tenderness  Musculoskeletal: Normal range of motion  Lymphadenopathy:     She has no cervical adenopathy  Neurological: She is alert  Skin: Skin is warm  Psychiatric: She has a normal mood and affect

## 2018-05-30 ENCOUNTER — HOSPITAL ENCOUNTER (OUTPATIENT)
Dept: MAMMOGRAPHY | Facility: CLINIC | Age: 58
Discharge: HOME | End: 2018-05-30
Payer: COMMERCIAL

## 2018-05-30 DIAGNOSIS — C50.912 MALIGNANT NEOPLASM OF LEFT FEMALE BREAST (HCC): ICD-10-CM

## 2018-05-30 PROCEDURE — 77065 DX MAMMO INCL CAD UNI: CPT

## 2018-06-11 DIAGNOSIS — Z17.0 BILATERAL MALIGNANT NEOPLASM OF BREAST IN FEMALE, ESTROGEN RECEPTOR POSITIVE, UNSPECIFIED SITE OF BREAST (HCC): ICD-10-CM

## 2018-06-11 DIAGNOSIS — C50.911 BILATERAL MALIGNANT NEOPLASM OF BREAST IN FEMALE, ESTROGEN RECEPTOR POSITIVE, UNSPECIFIED SITE OF BREAST (HCC): ICD-10-CM

## 2018-06-11 DIAGNOSIS — C50.912 BILATERAL MALIGNANT NEOPLASM OF BREAST IN FEMALE, ESTROGEN RECEPTOR POSITIVE, UNSPECIFIED SITE OF BREAST (HCC): ICD-10-CM

## 2018-06-11 RX ORDER — ANASTROZOLE 1 MG/1
1 TABLET ORAL DAILY
Qty: 90 TABLET | Refills: 1 | Status: SHIPPED | OUTPATIENT
Start: 2018-06-11 | End: 2019-02-25 | Stop reason: SDUPTHER

## 2018-07-16 ENCOUNTER — OFFICE VISIT (OUTPATIENT)
Dept: FAMILY MEDICINE CLINIC | Facility: CLINIC | Age: 58
End: 2018-07-16
Payer: COMMERCIAL

## 2018-07-16 ENCOUNTER — TRANSCRIBE ORDERS (OUTPATIENT)
Dept: LAB | Facility: CLINIC | Age: 58
End: 2018-07-16

## 2018-07-16 ENCOUNTER — APPOINTMENT (OUTPATIENT)
Dept: LAB | Facility: CLINIC | Age: 58
End: 2018-07-16
Payer: COMMERCIAL

## 2018-07-16 VITALS
TEMPERATURE: 97.1 F | WEIGHT: 173 LBS | RESPIRATION RATE: 18 BRPM | DIASTOLIC BLOOD PRESSURE: 78 MMHG | HEART RATE: 81 BPM | OXYGEN SATURATION: 96 % | BODY MASS INDEX: 30.65 KG/M2 | SYSTOLIC BLOOD PRESSURE: 128 MMHG | HEIGHT: 63 IN

## 2018-07-16 DIAGNOSIS — I10 BENIGN ESSENTIAL HYPERTENSION: ICD-10-CM

## 2018-07-16 DIAGNOSIS — M25.572 LEFT ANKLE PAIN, UNSPECIFIED CHRONICITY: ICD-10-CM

## 2018-07-16 DIAGNOSIS — F32.A DEPRESSION, UNSPECIFIED DEPRESSION TYPE: ICD-10-CM

## 2018-07-16 DIAGNOSIS — F41.9 ANXIETY: ICD-10-CM

## 2018-07-16 DIAGNOSIS — E03.9 HYPOTHYROIDISM, UNSPECIFIED TYPE: Primary | ICD-10-CM

## 2018-07-16 DIAGNOSIS — E78.5 HYPERLIPIDEMIA, UNSPECIFIED HYPERLIPIDEMIA TYPE: ICD-10-CM

## 2018-07-16 DIAGNOSIS — E03.9 HYPOTHYROIDISM, UNSPECIFIED TYPE: ICD-10-CM

## 2018-07-16 DIAGNOSIS — K29.70 GASTRITIS, PRESENCE OF BLEEDING UNSPECIFIED, UNSPECIFIED CHRONICITY, UNSPECIFIED GASTRITIS TYPE: ICD-10-CM

## 2018-07-16 PROBLEM — J02.9 SORE THROAT: Status: RESOLVED | Noted: 2018-03-15 | Resolved: 2018-07-16

## 2018-07-16 LAB
ALBUMIN SERPL BCP-MCNC: 3.9 G/DL (ref 3.5–5)
ALP SERPL-CCNC: 55 U/L (ref 46–116)
ALT SERPL W P-5'-P-CCNC: 33 U/L (ref 12–78)
ANION GAP SERPL CALCULATED.3IONS-SCNC: 4 MMOL/L (ref 4–13)
AST SERPL W P-5'-P-CCNC: 21 U/L (ref 5–45)
BILIRUB SERPL-MCNC: 0.39 MG/DL (ref 0.2–1)
BUN SERPL-MCNC: 23 MG/DL (ref 5–25)
CALCIUM SERPL-MCNC: 10 MG/DL (ref 8.3–10.1)
CHLORIDE SERPL-SCNC: 107 MMOL/L (ref 100–108)
CHOLEST SERPL-MCNC: 189 MG/DL (ref 50–200)
CO2 SERPL-SCNC: 27 MMOL/L (ref 21–32)
CREAT SERPL-MCNC: 1.01 MG/DL (ref 0.6–1.3)
GFR SERPL CREATININE-BSD FRML MDRD: 62 ML/MIN/1.73SQ M
GLUCOSE P FAST SERPL-MCNC: 94 MG/DL (ref 65–99)
HDLC SERPL-MCNC: 80 MG/DL (ref 40–60)
LDLC SERPL CALC-MCNC: 88 MG/DL (ref 0–100)
NONHDLC SERPL-MCNC: 109 MG/DL
POTASSIUM SERPL-SCNC: 4.3 MMOL/L (ref 3.5–5.3)
PROT SERPL-MCNC: 8.2 G/DL (ref 6.4–8.2)
SODIUM SERPL-SCNC: 138 MMOL/L (ref 136–145)
TRIGL SERPL-MCNC: 105 MG/DL
TSH SERPL DL<=0.05 MIU/L-ACNC: 2.2 UIU/ML (ref 0.36–3.74)

## 2018-07-16 PROCEDURE — 99214 OFFICE O/P EST MOD 30 MIN: CPT | Performed by: PHYSICIAN ASSISTANT

## 2018-07-16 PROCEDURE — 80053 COMPREHEN METABOLIC PANEL: CPT | Performed by: PHYSICIAN ASSISTANT

## 2018-07-16 PROCEDURE — 36415 COLL VENOUS BLD VENIPUNCTURE: CPT | Performed by: PHYSICIAN ASSISTANT

## 2018-07-16 PROCEDURE — 80061 LIPID PANEL: CPT | Performed by: PHYSICIAN ASSISTANT

## 2018-07-16 PROCEDURE — 84443 ASSAY THYROID STIM HORMONE: CPT | Performed by: PHYSICIAN ASSISTANT

## 2018-07-16 RX ORDER — LISINOPRIL 10 MG/1
10 TABLET ORAL
Qty: 90 TABLET | Refills: 1 | Status: SHIPPED | OUTPATIENT
Start: 2018-07-16 | End: 2018-10-22 | Stop reason: SDUPTHER

## 2018-07-16 RX ORDER — ATORVASTATIN CALCIUM 40 MG/1
40 TABLET, FILM COATED ORAL DAILY
Qty: 90 TABLET | Refills: 1 | Status: SHIPPED | OUTPATIENT
Start: 2018-07-16 | End: 2018-10-22 | Stop reason: SDUPTHER

## 2018-07-16 RX ORDER — LEVOTHYROXINE SODIUM 88 UG/1
88 TABLET ORAL DAILY
Qty: 90 TABLET | Refills: 1 | Status: SHIPPED | OUTPATIENT
Start: 2018-07-16 | End: 2018-10-22 | Stop reason: SDUPTHER

## 2018-07-16 RX ORDER — BUPROPION HYDROCHLORIDE 300 MG/1
300 TABLET ORAL DAILY
Qty: 90 TABLET | Refills: 1 | Status: SHIPPED | OUTPATIENT
Start: 2018-07-16 | End: 2018-10-22 | Stop reason: SDUPTHER

## 2018-07-16 NOTE — PROGRESS NOTES
Assessment/Plan:    Patient Instructions   Proceed with an x-ray of the left ankle  Refer to Podiatry   Proceed with fasting blood work and urine test today  Continue Famotidine 40 mg as needed  Follow-up if any symptoms increase  Routine follow-up in about 4 months  M*Modal software was used to dictate this note  It may contain errors with dictating incorrect words/spelling  Please contact provider directly for any questions  Diagnoses and all orders for this visit:    Hypothyroidism, unspecified type  -     Microalbumin / creatinine urine ratio; Future  -     Comprehensive metabolic panel  -     TSH, 3rd generation with Free T4 reflex  -     Lipid panel  -     levothyroxine 88 mcg tablet; Take 1 tablet (88 mcg total) by mouth daily    Benign essential hypertension  -     Microalbumin / creatinine urine ratio; Future  -     Comprehensive metabolic panel  -     TSH, 3rd generation with Free T4 reflex  -     Lipid panel  -     lisinopril (ZESTRIL) 10 mg tablet; Take 1 tablet (10 mg total) by mouth daily in the early morning    Left ankle pain, unspecified chronicity  -     XR ankle 3+ vw left; Future  -     Ambulatory referral to Podiatry; Future    Hyperlipidemia, unspecified hyperlipidemia type  -     Comprehensive metabolic panel  -     Lipid panel  -     atorvastatin (LIPITOR) 40 mg tablet; Take 1 tablet (40 mg total) by mouth daily    Depression, unspecified depression type  -     buPROPion (WELLBUTRIN XL) 300 mg 24 hr tablet; Take 1 tablet (300 mg total) by mouth daily    Anxiety  -     buPROPion (WELLBUTRIN XL) 300 mg 24 hr tablet; Take 1 tablet (300 mg total) by mouth daily    Gastritis, presence of bleeding unspecified, unspecified chronicity, unspecified gastritis type          Subjective:      Patient ID: Dwight Mesa is a 62 y o  female  Patient presents today for follow-up of hypertension, hypothyroidism  She states that she is compliant with her medication    Denies any chest pain, shortness of breath or swelling of her lower extremities  Although she states over the past several months she has been having some left ankle pain and swelling  She denies any known injury  No current treatment  She only takes Famotidine to 40 mg as needed for gastritis  She does take her Wellbutrin daily for anxiety/depression and states that her symptoms are stable on the medication  The following portions of the patient's history were reviewed and updated as appropriate:   She  has a past medical history of Anxiety; Breast cancer ([place] [address] ); Colon polyps; Depression; Disease of thyroid gland; Diverticulosis of colon; Encounter for screening colonoscopy; Gastritis; Hemorrhoids; High cholesterol; Hypertension; Hypothyroidism; Lymphedema; S/P radiation therapy; Thyroid cancer ([place] [address] ); and Wears glasses  She   Patient Active Problem List    Diagnosis Date Noted    Left ankle pain 07/16/2018    Hyperglycemia 03/15/2018    Chronic low back pain 09/22/2017    Agoraphobia 09/22/2017    Anxiety 09/22/2017    Tinea pedis 05/10/2017    Arthralgia of multiple joints 05/10/2017    Obesity 10/07/2016    Hot flashes 10/07/2016    Gastritis 08/31/2015    Breast cancer ([place] [address] ) 03/30/2015    Hyperlipidemia 11/11/2014    Malignant neoplasm of left breast ([place] [address] ) 12/06/2013    Lymphedema 08/21/2013    Benign essential hypertension 06/26/2013    Hypothyroidism 10/06/2012    Depression 10/06/2012     She  has a past surgical history that includes Breast surgery (Bilateral); Colonoscopy; Thyroidectomy; pr colonoscopy flx dx w/collj spec when pfrmd (N/A, 12/13/2016); pr nipple/areola reconstruction (Right, 4/15/2016); pr delay breast pros after breast surg (Right, 1/22/2016); pr removal of breast capsule (Right, 1/22/2016); Breast biopsy; Incisional breast biopsy; Breast lumpectomy (Left); Mastectomy, radical (Right); and Tubal ligation    Her family history includes Cancer in her family; Cervical cancer in her sister; Diabetes in her mother; Liver disease in her brother; No Known Problems in her father; Other in her mother  She  reports that she quit smoking about 8 years ago  She smoked 0 50 packs per day  She has never used smokeless tobacco  She reports that she drinks alcohol  She reports that she does not use drugs  Current Outpatient Prescriptions   Medication Sig Dispense Refill    anastrozole (ARIMIDEX) 1 mg tablet Take 1 tablet (1 mg total) by mouth daily 90 tablet 1    atorvastatin (LIPITOR) 40 mg tablet Take 1 tablet (40 mg total) by mouth daily 90 tablet 1    buPROPion (WELLBUTRIN XL) 300 mg 24 hr tablet Take 1 tablet (300 mg total) by mouth daily 90 tablet 1    Calcium Citrate 1040 MG TABS Take 1 tablet by mouth daily      famotidine (PEPCID) 40 MG tablet Take 1 tablet (40 mg total) by mouth daily at bedtime as needed for indigestion or heartburn (only takes prn ) 30 tablet 2    ketoconazole (NIZORAL) 2 % cream 2 (two) times a day Apply sparingly to affected area(s)  3    levothyroxine 88 mcg tablet Take 1 tablet (88 mcg total) by mouth daily 90 tablet 1    lisinopril (ZESTRIL) 10 mg tablet Take 1 tablet (10 mg total) by mouth daily in the early morning 90 tablet 1     No current facility-administered medications for this visit  Current Outpatient Prescriptions on File Prior to Visit   Medication Sig    anastrozole (ARIMIDEX) 1 mg tablet Take 1 tablet (1 mg total) by mouth daily    Calcium Citrate 1040 MG TABS Take 1 tablet by mouth daily    famotidine (PEPCID) 40 MG tablet Take 1 tablet (40 mg total) by mouth daily at bedtime as needed for indigestion or heartburn (only takes prn )    ketoconazole (NIZORAL) 2 % cream 2 (two) times a day Apply sparingly to affected area(s)    [DISCONTINUED] atorvastatin (LIPITOR) 40 mg tablet Take 1 tablet (40 mg total) by mouth daily    [DISCONTINUED] buPROPion (WELLBUTRIN XL) 300 mg 24 hr tablet Take 300 mg by mouth daily as needed        [DISCONTINUED] levothyroxine 88 mcg tablet Take 1 tablet (88 mcg total) by mouth daily    [DISCONTINUED] lisinopril (ZESTRIL) 10 mg tablet Take 1 tablet (10 mg total) by mouth daily in the early morning     No current facility-administered medications on file prior to visit  She is allergic to morphine and related       Review of Systems   Constitutional: Negative  Respiratory: Negative for shortness of breath  Cardiovascular: Negative for chest pain and leg swelling  Gastrointestinal: Negative for abdominal pain, diarrhea, nausea and vomiting  Musculoskeletal:        As stated in HPI         Objective:      /78 (BP Location: Right arm, Patient Position: Sitting, Cuff Size: Large)   Pulse 81   Temp (!) 97 1 °F (36 2 °C) (Tympanic)   Resp 18   Ht 5' 3" (1 6 m)   Wt 78 5 kg (173 lb)   LMP 02/21/2010 Comment: post menapausal - D/T chemo  SpO2 96%   Breastfeeding? No   BMI 30 65 kg/m²          Physical Exam   Constitutional: She appears well-developed and well-nourished  No distress  HENT:   Head: Normocephalic and atraumatic  Right Ear: External ear normal    Left Ear: External ear normal    Mouth/Throat: Oropharynx is clear and moist    Neck: Neck supple  No thyromegaly present  Cardiovascular: Normal rate, regular rhythm and normal heart sounds  No murmur heard  Pulmonary/Chest: Effort normal and breath sounds normal  No respiratory distress  She has no wheezes  She has no rales  Abdominal: Bowel sounds are normal  She exhibits no mass  There is no tenderness  Musculoskeletal: She exhibits no edema  Left ankle:  Possible mild swelling laterally  Mild tenderness over the anterior aspect  Lymphadenopathy:     She has no cervical adenopathy

## 2018-07-16 NOTE — PATIENT INSTRUCTIONS
Proceed with an x-ray of the left ankle  Refer to Podiatry   Proceed with fasting blood work and urine test today  Continue Famotidine 40 mg as needed  Follow-up if any symptoms increase  Routine follow-up in about 4 months

## 2018-07-17 ENCOUNTER — APPOINTMENT (OUTPATIENT)
Dept: LAB | Facility: HOSPITAL | Age: 58
End: 2018-07-17
Payer: COMMERCIAL

## 2018-07-17 ENCOUNTER — TRANSCRIBE ORDERS (OUTPATIENT)
Dept: ADMINISTRATIVE | Facility: HOSPITAL | Age: 58
End: 2018-07-17

## 2018-07-17 ENCOUNTER — HOSPITAL ENCOUNTER (OUTPATIENT)
Dept: RADIOLOGY | Facility: HOSPITAL | Age: 58
Discharge: HOME | End: 2018-07-17
Payer: COMMERCIAL

## 2018-07-17 DIAGNOSIS — I51.9 MYXEDEMA HEART DISEASE: Primary | ICD-10-CM

## 2018-07-17 DIAGNOSIS — E03.9 MYXEDEMA HEART DISEASE: Primary | ICD-10-CM

## 2018-07-17 DIAGNOSIS — M25.572 LEFT ANKLE PAIN, UNSPECIFIED CHRONICITY: ICD-10-CM

## 2018-07-17 DIAGNOSIS — I10 ESSENTIAL HYPERTENSION, MALIGNANT: ICD-10-CM

## 2018-07-17 LAB
CREAT UR-MCNC: 295 MG/DL
MICROALBUMIN UR-MCNC: 52.3 MG/L (ref 0–20)
MICROALBUMIN/CREAT 24H UR: 18 MG/G CREATININE (ref 0–30)

## 2018-07-17 PROCEDURE — 73610 X-RAY EXAM OF ANKLE: CPT

## 2018-07-17 PROCEDURE — 82043 UR ALBUMIN QUANTITATIVE: CPT

## 2018-07-17 PROCEDURE — 82570 ASSAY OF URINE CREATININE: CPT

## 2018-08-06 ENCOUNTER — OFFICE VISIT (OUTPATIENT)
Dept: FAMILY MEDICINE CLINIC | Facility: CLINIC | Age: 58
End: 2018-08-06
Payer: COMMERCIAL

## 2018-08-06 VITALS
SYSTOLIC BLOOD PRESSURE: 120 MMHG | TEMPERATURE: 97.1 F | HEART RATE: 72 BPM | BODY MASS INDEX: 27.64 KG/M2 | WEIGHT: 172 LBS | HEIGHT: 66 IN | OXYGEN SATURATION: 97 % | RESPIRATION RATE: 18 BRPM | DIASTOLIC BLOOD PRESSURE: 80 MMHG

## 2018-08-06 DIAGNOSIS — M25.572 LEFT ANKLE PAIN, UNSPECIFIED CHRONICITY: Primary | ICD-10-CM

## 2018-08-06 DIAGNOSIS — L85.3 XEROSIS OF SKIN: ICD-10-CM

## 2018-08-06 PROCEDURE — 3008F BODY MASS INDEX DOCD: CPT | Performed by: STUDENT IN AN ORGANIZED HEALTH CARE EDUCATION/TRAINING PROGRAM

## 2018-08-06 PROCEDURE — 99203 OFFICE O/P NEW LOW 30 MIN: CPT | Performed by: STUDENT IN AN ORGANIZED HEALTH CARE EDUCATION/TRAINING PROGRAM

## 2018-08-06 RX ORDER — AMMONIUM LACTATE 12 G/100G
CREAM TOPICAL AS NEEDED
Qty: 385 G | Refills: 0 | Status: SHIPPED | OUTPATIENT
Start: 2018-08-06 | End: 2018-08-20

## 2018-08-06 NOTE — PROGRESS NOTES
Podiatry Clinic Visit  Karlene Flores 62 y o  female MRN: [id number]  Encounter: [id number]    Assessment/Plan     Assessment:  1  Left lateral ankle sprain - acute on chronic  - focal pain at ATFL and CFL  - localized edema to lateral ankle  - ataxic gait  -XR neg for fx    Plan:  - Patient was seen/examined  All questions and concerns addressed  - Instructed patient on RICE for left ankle - rest, ice, compression and elevate, also instructed patient to take OTC ibuprofen for 2 weeks  - provided ACE wrap for L ankle  - Rx for Physical therapy  - RTC in 3 months after PT      History of Present Illness     HPI:  Karlene Flores is a 62 y o  female who presents with left ankle pain  States this has been going on for about 4-5 months and it's not getting any better  Does not recall any traumatic injury, states she rolls her sky frequently  She has not tried anything to make it better  The patient denies any nausea, vomiting, fever, chills, shortness of breath, or chest pains  Review of Systems   Constitutional: Negative  HENT: Negative  Eyes: Negative  Respiratory: Negative  Cardiovascular: Negative  Gastrointestinal: Negative  Musculoskeletal: Negative   Skin: swelling of left ankle  Neurological: Negative  Historical Information   Past Medical History:   Diagnosis Date    Anxiety     Breast cancer ([address] )     Cedric  breast cancer; lympedema in (L) arm,R nipple reconstruction today 4/15/2016    Colon polyps     Depression     Disease of thyroid gland     hypothyroidism   Diverticulosis of colon     Encounter for screening colonoscopy     Gastritis     Hemorrhoids     High cholesterol     Hypertension     Hypothyroidism     Lymphedema     L arm post radiation    S/P radiation therapy     2010 - 2012 to (L) breast along with chemo      Thyroid cancer ([address] )     Wears glasses      Past Surgical History:   Procedure Laterality Date    BREAST BIOPSY      percutan needle core use imag guide (Stereotactic)    BREAST LUMPECTOMY Left     BREAST SURGERY Bilateral     COLONOSCOPY      INCISIONAL BREAST BIOPSY      MASTECTOMY, RADICAL Right     MT COLONOSCOPY FLX DX W/COLLJ SPEC WHEN PFRMD N/A 12/13/2016    Procedure: COLONOSCOPY;  Surgeon: Nathanael Morris MD;  Location: [facility];   Service: General    MT DELAY BREAST PROS AFTER BREAST SURG Right 1/22/2016    Procedure: EXCHANGE RIGHT BREAST  IMPLANT/EXPANDER ;  Surgeon: Irwin Serna MD;  Location: [facility];  Service: Plastics    MT NIPPLE/AREOLA RECONSTRUCTION Right 4/15/2016    Procedure: Meka Sep;  Surgeon: Irwin Serna MD;  [facility];  Service: Plastics    MT REMOVAL OF BREAST CAPSULE Right 1/22/2016    Procedure: CAPSULECTOMY;  Surgeon: Irwin Serna MD;  [facility];  Service: Plastics    THYROIDECTOMY      CA    TUBAL LIGATION       Social History   History   Alcohol Use    Yes     Comment: Socially     History   Drug Use No     History   Smoking Status    Former Smoker    Packs/day: 0 50    Quit date: 1/22/2010   Smokeless Tobacco    Never Used     Family History:   Family History   Problem Relation Age of Onset    Diabetes Mother     Other Mother         Forgetfulness    No Known Problems Father     Cervical cancer Sister     Liver disease Brother     Cancer Family        Meds/Allergies     (Not in a hospital admission)  Allergies   Allergen Reactions    Morphine And Related Itching       Objective     Current Vitals:   Blood Pressure: 120/80 (08/06/18 1037)  Pulse: 72 (08/06/18 1037)  Temperature: (!) 97 1 °F (36 2 °C) (08/06/18 1037)  Temp Source: Tympanic (08/06/18 1037)  Respirations: 18 (08/06/18 1037)  Height: 5' 6" (167 6 cm) (08/06/18 1037)  Weight - Scale: 78 kg (172 lb) (08/06/18 1037)  SpO2: 97 % (08/06/18 1037)        /80   Pulse 72   Temp (!) 97 1 °F (36 2 °C) (Tympanic)   Resp 18   Ht 5' 6" (1 676 m)   Wt 78 kg (172 lb)   LMP 02/21/2010 Comment: post menapausal - D/T chemo  SpO2 97%   BMI 27 76 kg/m²       Lower Extremity Exam:    Musculoskeletal:  MMT is 5/5 to all compartments of the LE, localized edema noted in left lateral ankle, ankle ROM within normal limits Digital ROM is intact, pain on palpation to ATFL and CFL, negative anterior drawer sign      Pulses:   R DP is +2/4, R PT is +2/4, L DP is +2/4, L PT is +2/4, CFT< 3sec to all digits  Pedal hair is Present     Skin:  No open Lesions  Skin of the LE is normal texture, turgor  Neurologic:  Gross sensation is intact   Protective sensation is Intact

## 2018-08-09 ENCOUNTER — EVALUATION (OUTPATIENT)
Dept: PHYSICAL THERAPY | Facility: REHABILITATION | Age: 58
End: 2018-08-09
Payer: COMMERCIAL

## 2018-08-09 DIAGNOSIS — M25.572 LEFT ANKLE PAIN, UNSPECIFIED CHRONICITY: ICD-10-CM

## 2018-08-09 PROCEDURE — G8979 MOBILITY GOAL STATUS: HCPCS | Performed by: PHYSICAL THERAPIST

## 2018-08-09 PROCEDURE — 97162 PT EVAL MOD COMPLEX 30 MIN: CPT | Performed by: PHYSICAL THERAPIST

## 2018-08-09 PROCEDURE — G8978 MOBILITY CURRENT STATUS: HCPCS | Performed by: PHYSICAL THERAPIST

## 2018-08-09 NOTE — PROGRESS NOTES
PT Evaluation     Today's date: 8/10/2018  Patient name: Nayeli Chopra  : 1960  MRN: [id number]  Referring provider: Tapan Clarke MD  Dx:   Encounter Diagnosis     ICD-10-CM    1  Left ankle pain, unspecified chronicity M25 572 Ambulatory referral to Physical Therapy     PT plan of care cert/re-cert       Start Time: 09  Stop Time: 1025  Total time in clinic (min): 50 minutes    Assessment  Impairments: abnormal gait, abnormal or restricted ROM, activity intolerance, impaired balance, impaired physical strength, lacks appropriate home exercise program and weight-bearing intolerance    Assessment details: Patient is a 62year old female that presents with left ankle/lower leg pain  Patient presents with decreased strength, decreased ROM, gait abnormalities, and pain  Patient has difficulty with ambulation, negotiation of stairs, and completing her housework secondary to impairments  Patient would benefit from skilled physical therapy services to address impairments to maximize function  Understanding of Dx/Px/POC: good   Prognosis: fair  Prognosis details: Patient has co-morbidities such as depression that may limit her progression in therapy    Goals  Impairment:  1  Patient will reports 50% reduction in pain in 4 weeks to maximize function  2   Patient will improve strength to 4/5 in all planes to maximize function  3   Patient will improve ROM to [facility] in 4 weeks to maximize function  Functional:  1  Patient will improve FOTO by 5 points to 72/100 in 4 weeks to maximize function  2  Patient will be independent with HEP in 4 weeks to maximize function  3  Patient will report no difficulty with ambulation in 4 weeks to maximize function  4  Patient will report no difficulty with housework in 4 weeks to maximize function  5  Patient will report no difficulty with negotiation of stairs in 4 weeks to maximize function        Plan  Patient would benefit from: skilled physical therapy  Planned modality interventions: cryotherapy  Planned therapy interventions: manual therapy, therapeutic exercise, strengthening, patient education, balance, gait training and home exercise program  Frequency: 2x week  Duration in visits: 8  Duration in weeks: 4  Treatment plan discussed with: patient  Plan details: Patient will be a RE in 4 weeks  Subjective Evaluation    History of Present Illness  Mechanism of injury: Patient reports a history of left ankle pain for about two years  Patient reports rolling her ankle frequently on uneven sidewalks as she walks  Her pain has been especially bad for about 4-5 months  Patient reports the pain starting in the lateral ankle and radiating up to her knee  Patient reports no recent lumbar spine or pain proximal to her knee  Patient reports difficulty with ambulation, negotiation of stairs, and completing her housework  Patient also notes swelling at her ankle  Pain  Current pain rating: 3  At best pain ratin  At worst pain rating: 3  Location: lateral ankle  Quality: burning  Relieving factors: rest and ice  Aggravating factors: walking and stair climbing  Progression: no change    Social Support  Lives with: alone      Diagnostic Tests  X-ray: normal  Treatments  Current treatment: physical therapy  Patient Goals  Patient goals for therapy: decreased pain          Objective     Tenderness   Left Knee   Tenderness in the fibular head  Left Ankle/Foot   Tenderness in the anterior talofibular ligament, fifth metatarsal base, fibula, first metatarsal head, navicular, peroneal tendon and plantar fascia  No tenderness in the Achilles insertion, lateral malleolus, medial malleolus and proximal Achilles  Right Ankle/Foot   No tenderness       Additional Tenderness Details  Cuboid (+) tenderness      Active Range of Motion     Lumbar   Flexion: WFL  Extension: WFL  Left Knee   Flexion: [facility]    Additional Active Range of Motion Details  No change in ankle symptoms with lumbar ROM  Increased pain with posterior and anterior fibular head glide with knee flexion    Passive Range of Motion   Left Ankle/Foot    Dorsiflexion (ke): -5 degrees with pain  Plantar flexion: WFL  Inversion: 35 degrees with pain  Eversion: 25 degrees   Great toe extension: WFL    Right Ankle/Foot    Dorsiflexion (ke): 0 degrees   Plantar flexion: WFL  Inversion: 30 degrees   Eversion: 15 degrees   Great toe extension: WFL    Strength/Myotome Testing     Left Hip   Planes of Motion   Flexion: 3+    Right Hip   Planes of Motion   Flexion: 3+    Left Knee   Flexion: 4  Extension: 4    Right Knee   Flexion: 4  Extension: 4    Left Ankle/Foot   Dorsiflexion: 4  Plantar flexion: 4  Inversion: 4  Eversion: 3-    Right Ankle/Foot   Dorsiflexion: 4+  Plantar flexion: 4+  Inversion: 4  Eversion: 3    Ambulation     Ambulation: Level Surfaces   Ambulation without assistive device: independent    Observational Gait   Gait: antalgic   Decreased walking speed and stride length     Left foot contact pattern: foot flat  Right foot contact pattern: foot flat    Functional Assessment     Comments  Increased left lateral leg pain standing without shoes      Flowsheet Rows      Most Recent Value   PT/OT G-Codes   Current Score  67   Projected Score  72   FOTO information reviewed  Yes   Assessment Type  Evaluation   G code set  Mobility: Walking & Moving Around   Mobility: Walking and Moving Around Current Status ()  CJ   Mobility: Walking and Moving Around Goal Status ()  CJ          Precautions: depression, HTN, cancer, anxiety    Daily Treatment Diary     Manual  8/9            Ankle PROM                                                                     Exercise Diary  8/9            Recumbent bike             Ankle pump             Ankle IV/EV             Active heel slide flexion             SAQ             BAPS             Perturbations with PT Modalities  8/9            Ice L ankle and fibular head 10 min

## 2018-08-13 ENCOUNTER — OFFICE VISIT (OUTPATIENT)
Dept: PHYSICAL THERAPY | Facility: REHABILITATION | Age: 58
End: 2018-08-13
Payer: COMMERCIAL

## 2018-08-13 DIAGNOSIS — M25.572 LEFT ANKLE PAIN, UNSPECIFIED CHRONICITY: Primary | ICD-10-CM

## 2018-08-13 PROCEDURE — 97110 THERAPEUTIC EXERCISES: CPT

## 2018-08-13 NOTE — PROGRESS NOTES
Daily Note     Today's date: 2018  Patient name: Nila Cockayne  : 1960  MRN: [id number]  Referring provider: Corrine Chandler DPM  Dx:   Encounter Diagnosis     ICD-10-CM    1  Left ankle pain, unspecified chronicity M25 572                   Subjective: Pt reports my foot doesn't feel too bad today  Pt denies any pain when seated, but pain increases to 2/10 when ambulating or weight bearing  Pt notes compliance with HEP but reports difficulty with inv/ev due to pain and finds that ice assists well with sx's  Objective: See treatment diary below  Precautions: depression, HTN, cancer, anxiety    Daily Treatment Diary     Manual             Ankle PROM  10 min                                                                   Exercise Diary             Recumbent bike  nv           Ankle pump  5"x10           Ankle IV/EV  5"x10 ea           Active heel slide flexion  5"x10           SAQ  5"x10           BAPS  nv           Perturbations with PT  15"x3                                                                                                                                                                                        Modalities             Ice L ankle and fibular head 10 min 10 min                                           Assessment: Tolerated treatment fair  Pt has difficulty performing eversion without hip rotation, is able to better isolate with tactile cueing to correct  Pt muscle guards through entire L LE and has difficulty relaxing causing her to note discomfort into quad/hip musculature, trialed mild ankle perturbations for short time duration with fair response  Patient would benefit from continued PT and was encouraged to continue using ice at home since it is providing relief  Plan: Progress treatment as tolerated

## 2018-08-16 ENCOUNTER — OFFICE VISIT (OUTPATIENT)
Dept: PHYSICAL THERAPY | Facility: REHABILITATION | Age: 58
End: 2018-08-16
Payer: COMMERCIAL

## 2018-08-16 DIAGNOSIS — M25.572 LEFT ANKLE PAIN, UNSPECIFIED CHRONICITY: Primary | ICD-10-CM

## 2018-08-16 PROCEDURE — 97110 THERAPEUTIC EXERCISES: CPT

## 2018-08-16 NOTE — PROGRESS NOTES
Daily Note     Today's date: 2018  Patient name: Cisco Davis  : 1960  MRN: [id number]  Referring provider: Mony Duncan DPM  Dx:   Encounter Diagnosis     ICD-10-CM    1  Left ankle pain, unspecified chronicity M25 572                   Subjective: Pt reports feeling "really good" after last session with minimal muscle soreness  Pt notes some soreness this AM but states my foot is getting better and notes compliance with HEP and use of ice at home  Objective: See treatment diary below  Precautions: depression, HTN, cancer, anxiety    Daily Treatment Diary     Manual            Ankle PROM  10 min 10 min                                                                  Exercise Diary            Recumbent bike  nv 5 min          Ankle pump  5"x10 5"x15          Ankle IV/EV  5"x10 ea 5"x15 ea          Active heel slide flexion  5"x10           SAQ  5"x10 5"x15          BAPS  nv L2 x10 ea dir          Perturbations with PT  15"x3 15"x3                                                                                                                                                                                       Modalities            Ice L ankle and fibular head 10 min 10 min 10 min                                          Assessment: Tolerated treatment fair  Pt fatigues with addition of bike and has difficulty with stabilization during BAPS board activities but is able to perform without pain  Pt continues to guard needing cues to relax, has greatest AROM limitation into DF  Pt has difficulty performing ankle inv/ev without hip motion  Patient would benefit from continued PT  Plan: Progress treatment as tolerated

## 2018-08-20 ENCOUNTER — OFFICE VISIT (OUTPATIENT)
Dept: HEMATOLOGY ONCOLOGY | Facility: CLINIC | Age: 58
End: 2018-08-20
Payer: COMMERCIAL

## 2018-08-20 VITALS
WEIGHT: 171 LBS | HEIGHT: 66 IN | OXYGEN SATURATION: 98 % | TEMPERATURE: 96.3 F | SYSTOLIC BLOOD PRESSURE: 128 MMHG | RESPIRATION RATE: 18 BRPM | BODY MASS INDEX: 27.48 KG/M2 | HEART RATE: 78 BPM | DIASTOLIC BLOOD PRESSURE: 82 MMHG

## 2018-08-20 DIAGNOSIS — C50.912 MALIGNANT NEOPLASM OF LEFT FEMALE BREAST, UNSPECIFIED ESTROGEN RECEPTOR STATUS, UNSPECIFIED SITE OF BREAST (HCC): Primary | ICD-10-CM

## 2018-08-20 PROCEDURE — 99214 OFFICE O/P EST MOD 30 MIN: CPT | Performed by: INTERNAL MEDICINE

## 2018-08-20 NOTE — PROGRESS NOTES
Hematology / Oncology Outpatient Follow Up Note    Asif Warren 62 y o  female GZQ:[id number]         Date:  8/20/2018    Assessment / Plan:  A 62year old postmenopausal with history of locally advanced left breast cancer, grade 3, triple-negative disease  She had complete pathological response to the neoadjuvant chemotherapy followed by lumpectomy and lymph node dissection resulting in the RADHA in 2010  She was found to be negative for BRCA gene mutation  In September 2015, she was diagnosed with stage IIB right breast cancer with invasive lobular histology, grade 1, ER/ME positive HER-2 negative disease  She underwent mastectomy with lymph node dissection resulting in RADHA  She had immediate reconstruction  Her tumor had Oncotype DX recurrence score of 21  Therefore, adjuvant chemotherapy was not strongly indicated  She has been on adjuvant hormonal therapy with anastrozole with minimal side effects  clinically, she has no evidence recurrent disease  I recommended her to continue with anastrozole 1 mg once a day  She is in agreement with my recommendation  I will see her again in 6 months for routine follow-up           Subjective:      HPI:             Interval History:  A 62year old postmenopausal woman with locally advanced left breast cancer with triple-negative disease diagnosed in November 2010  She underwent neoadjuvant chemotherapy with a c  followed by paclitaxel resulted in complete pathological response  She underwent lumpectomy with axillary lymph node dissection followed by adjuvant radiation treatment  She underwent genetic testing which was negative for BRCA gene mutation  She has no evidence of recurrence of triple-negative breast cancer, to date  She was diagnosed with stage IIB right breast cancer, grade 1, ER/ME positive HER-2 negative disease in 2015  This was invasive lobular carcinoma  Her tumor had Oncotype DX score of 21   Since October 2015, she has been on adjuvant hormonal therapy with anastrozole  She came in today for follow-up    she has chronic left upper extremity lymphedema which has not changed  She feels well  She has no complaint hot flashes  However, she continued to have mild-to-moderate musculoskeletal symptoms  She denied cough, sputum production or shortness of breast   Her weight is stable  She has normal performance status         Objective:      Primary Diagnosis:     1  Locally advanced left breast cancer  Clinical T3, pathological at least N1, M0 disease with ER/MT-negative, HER2-negative disease  Diagnosed in November 2010    2  Negative for BRCA gene mutation  3  Right breast cancer, stage IIB(pT2, pN1a, M0)  Grade 1  Invasive lobular histology  ER/MT positive, HER-2 negative disease  Oncotype DX recurrence score of 21  Diagnosed in September 2015       Cancer Staging:  No matching staging information was found for the patient         Previous Hematologic/ Oncologic Treatment:      1  Neoadjuvant chemotherapy with dose-dense AC x4 followed by weekly paclitaxel x12    2  Whole-breast radiation therapy completed in September of 2011       Current Hematologic/ Oncologic Treatment:       Adjuvant hormonal therapy with anastrozole since October 2015       Disease Status:      1  Complete pathological response to neoadjuvant chemotherapy  2  RADHA status post left lumpectomy with axillary lymph node dissection  3  RADHA, status post right mastectomy with sentinel lymph node biopsy with reconstruction in September 2015       Test Results:     Pathology:     Right mastectomy specimen showed 2 cm of invasive lobular carcinoma, grade 1, ER/MT positive, HER-2 negative disease  One sentinel lymph node was positive for metastatic disease  Stage IIB(pT2, pN1a, M0)  Oncotype DX recurrence score 21       Radiology:     Mammography in the left breast in May 2018 was benign   BI-RADS 2       Laboratory:     See below     Physical Exam:        General Appearance: Alert, oriented          Eyes:    PERRL   Ears:    Normal external ear canals, both ears   Nose:   Nares normal, septum midline   Throat:   Mucosa moist  Pharynx without injection  Neck:   Supple         Lungs:     Clear to auscultation bilaterally   Chest Wall:    No tenderness or deformity    Heart:    Regular rate and rhythm         Abdomen:     Soft, non-tender, bowel sounds +, no organomegaly               Extremities:   Extremities no cyanosis, left upper extremity lymphedema          Skin:   no rash or icterus  Lymph nodes:   Cervical, supraclavicular, and axillary nodes normal   Neurologic:   CNII-XII intact, normal strength, sensation and reflexes     Throughout             Breast exam:   Right breast is negative  Left breast status post lumpectomy with completely closed wound in left upper quadrant  No palpable mass or nodule in the left breast             ROS: Review of Systems   Musculoskeletal:        Musculoskeletal symptoms   All other systems reviewed and are negative  Imaging: No results found  Labs:   Lab Results   Component Value Date    WBC 5 22 05/10/2017    HGB 12 2 05/10/2017    HCT 38 2 05/10/2017    MCV 88 05/10/2017     05/10/2017     Lab Results   Component Value Date     07/16/2018    K 4 3 07/16/2018     07/16/2018    CO2 27 07/16/2018    ANIONGAP 4 07/16/2018    BUN 23 07/16/2018    CREATININE 1 01 07/16/2018    GLUCOSE 104 10/18/2016    GLUF 94 07/16/2018    CALCIUM 10 0 07/16/2018    AST 21 07/16/2018    ALT 33 07/16/2018    ALKPHOS 55 07/16/2018    PROT 8 2 07/16/2018    BILITOT 0 39 07/16/2018    EGFR 62 07/16/2018         Current Medications: Reviewed  Allergies: Reviewed  PMH/FH/SH:  Reviewed      Vital Sign:    Body surface area is 1 87 meters squared      Wt Readings from Last 3 Encounters:   08/20/18 77 6 kg (171 lb)   08/06/18 78 kg (172 lb)   07/16/18 78 5 kg (173 lb)        Temp Readings from Last 3 Encounters:   08/20/18 (!) 96 3 °F (35 7 °C) (Tympanic)   08/06/18 (!) 97 1 °F (36 2 °C) (Tympanic)   07/16/18 (!) 97 1 °F (36 2 °C) (Tympanic)        BP Readings from Last 3 Encounters:   08/20/18 128/82   08/06/18 120/80   07/16/18 128/78         Pulse Readings from Last 3 Encounters:   08/20/18 78   08/06/18 72   07/16/18 81     @LASTSAO2(3)@

## 2018-08-21 ENCOUNTER — OFFICE VISIT (OUTPATIENT)
Dept: PHYSICAL THERAPY | Facility: REHABILITATION | Age: 58
End: 2018-08-21
Payer: COMMERCIAL

## 2018-08-21 DIAGNOSIS — M25.572 LEFT ANKLE PAIN, UNSPECIFIED CHRONICITY: Primary | ICD-10-CM

## 2018-08-21 PROCEDURE — 97110 THERAPEUTIC EXERCISES: CPT | Performed by: PHYSICAL THERAPIST

## 2018-08-21 NOTE — PROGRESS NOTES
Daily Note     Today's date: 2018  Patient name: Nila Cockayne  : 1960  MRN: [id number]  Referring provider: Corrine Chandler DPM  Dx:   Encounter Diagnosis     ICD-10-CM    1  Left ankle pain, unspecified chronicity M25 572                   Subjective: Patient reports that she is doing well this visit  Patient reports continues reduction overall with her pain  Objective: See treatment diary below    Precautions: depression, HTN, cancer, anxiety     Daily Treatment Diary      Manual                 Ankle PROM   10 min 10 min  5 min                                                                                                                     Exercise Diary                 Recumbent bike   nv 5 min  7 min               Ankle pump   5"x10 5"x15  5" 20x               Ankle IV/EV   5"x10 ea 5"x15 ea  15x each               Active heel slide flexion   5"x10    5" 15x               SAQ   5"x10 5"x15  5" 20x               BAPS   nv L2 x10 ea dir  L2 10 each               Perturbations with PT   15"x3 15"x3  15x                                                                                                                                                                                                                                                                                                                                             Modalities                 Ice L ankle and fibular head 10 min 10 min 10 min  10 min                                                                    Assessment: Tolerated treatment fair  Patient would benefit from continued PT  Patient has continued tenderness to palpation lateral and posterior/lateral left ankle  Patient has difficulty and pain with eversion AROM  Patient has difficulty with perturbations with eversion  Pain with PROM IV and EV  Patient had moderate restriction into DF PROM  Patient would benefit from continued ankle strengthening and ROM to maximize function  Plan: Progress treatment as tolerated          One on one with Nelda MCDONOUGH PT from 9:00 am-9:07 am, unsupervised therex from 9:07 am-9:08 am, one on one remainder of session

## 2018-08-23 ENCOUNTER — OFFICE VISIT (OUTPATIENT)
Dept: PHYSICAL THERAPY | Facility: REHABILITATION | Age: 58
End: 2018-08-23
Payer: COMMERCIAL

## 2018-08-23 DIAGNOSIS — M25.572 LEFT ANKLE PAIN, UNSPECIFIED CHRONICITY: Primary | ICD-10-CM

## 2018-08-23 PROCEDURE — 97110 THERAPEUTIC EXERCISES: CPT

## 2018-08-23 NOTE — PROGRESS NOTES
Daily Note     Today's date: 2018  Patient name: Radha Engel  : 1960  MRN: [id number]  Referring provider: Nikolay Trevino DPM  Dx:   Encounter Diagnosis     ICD-10-CM    1  Left ankle pain, unspecified chronicity M25 572                   Subjective: Patient reports discomfort initially after last session that diminished soon after leaving and notes since she has been feeling "much better "  Pt notes "I feel good today" upon arrival to PT  Objective: See treatment diary below    Precautions: depression, HTN, cancer, anxiety     Daily Treatment Diary      Manual               Ankle PROM   10 min 10 min  5 min 10 min                                                                                                                   Exercise Diary               Recumbent bike   nv 5 min  7 min 7 min             Ankle pump   5"x10 5"x15  5" 20x 5"x20             Ankle IV/EV   5"x10 ea 5"x15 ea  15x each 5"x20 ea             Active heel slide flexion   5"x10    5" 15x  5"x15             SAQ   5"x10 5"x15  5" 20x 5"x20             BAPS   nv L2 x10 ea dir  L2 10 each  L2 x10 ea             Perturbations with PT   15"x3 15"x3  15x 15"x3                                                                                                                                                                                                                                                                                                                                           Modalities               Ice L ankle and fibular head 10 min 10 min 10 min  10 min  10 min                                                                  Assessment: Tolerated treatment fair  Pt continues to have greatest restrictions with DF both actively and passively    Pt muscle guards with PROM and winces at times hwen guarding, when patient relaxes she is able to achieve greater ROM with no end range pain  Pt is challenged with BAPS board needing cues during inv/ev and circles without hip compensation  Patient would benefit from continued PT  Plan: Progress treatment as tolerated  Pt was supervised 1:1 by Jagdish Saba PT from 9:45 - 9:50 am and was 1:1 with treating clinician for rest of session

## 2018-08-27 ENCOUNTER — OFFICE VISIT (OUTPATIENT)
Dept: PHYSICAL THERAPY | Facility: REHABILITATION | Age: 58
End: 2018-08-27
Payer: COMMERCIAL

## 2018-08-27 DIAGNOSIS — M25.572 LEFT ANKLE PAIN, UNSPECIFIED CHRONICITY: Primary | ICD-10-CM

## 2018-08-27 PROCEDURE — 97110 THERAPEUTIC EXERCISES: CPT | Performed by: PHYSICAL THERAPIST

## 2018-08-27 PROCEDURE — 97112 NEUROMUSCULAR REEDUCATION: CPT | Performed by: PHYSICAL THERAPIST

## 2018-08-27 NOTE — PROGRESS NOTES
Daily Note     Today's date: 2018  Patient name: Harvey Goldstein  : 1960  MRN: [id number]  Referring provider: Yuan Chavez DPM  Dx:   Encounter Diagnosis     ICD-10-CM    1  Left ankle pain, unspecified chronicity M25 572                 Subjective: Patient reported that she was able to walk multiple loops around her neighborhood with decreased pain  Patient noted that there was still swelling in the bottom of her foot  When performing some of the exercises she noted that they have become easier to perform  Objective: See treatment diary below    Precautions: depression, HTN, cancer, anxiety     Daily Treatment Diary      Manual             Ankle PROM   10 min 10 min  5 min 10 min  10 min                                                                                                                 Exercise Diary             Recumbent bike   nv 5 min  7 min 7 min  7 min           Ankle pump   5"x10 5"x15  5" 20x 5"x20  5''x8           Ankle IV/EV   5"x10 ea 5"x15 ea  15x each 5"x20 ea  5''x20           Active heel slide flexion   5"x10    5" 15x  5"x15  5''15           SAQ   5"x10 5"x15  5" 20x 5"x20  5''20           BAPS   nv L2 x10 ea dir  L2 10 each  L2 x10 ea  L2 x15  each           Perturbations with PT supine   15"x3 15"x3  15x 15"x3  20''x3                                                                                                                                                                                                                                                                                                                                         Modalities             Ice L ankle and fibular head 10 min 10 min 10 min  10 min  10 min  10  min                                                                    Assessment: Patient tolerated treatment fair   Patient had pain throughout treatment session  She required cueing for baps board, but was able to perform  Patient required cueing to relax her muscles throughout session  Patient would benefit from continued PT for continued strengthening and DF ROM  Patient presents with DF PROM to neutral this visit  Plan: Progress treatment as tolerated          Patient treated by CHRISTIAN Del Rio and supervised by Velia Manzano DPT

## 2018-08-30 ENCOUNTER — APPOINTMENT (OUTPATIENT)
Dept: PHYSICAL THERAPY | Facility: REHABILITATION | Age: 58
End: 2018-08-30
Payer: COMMERCIAL

## 2018-09-04 ENCOUNTER — OFFICE VISIT (OUTPATIENT)
Dept: PHYSICAL THERAPY | Facility: REHABILITATION | Age: 58
End: 2018-09-04
Payer: COMMERCIAL

## 2018-09-04 DIAGNOSIS — M25.572 LEFT ANKLE PAIN, UNSPECIFIED CHRONICITY: Primary | ICD-10-CM

## 2018-09-04 PROCEDURE — 97110 THERAPEUTIC EXERCISES: CPT

## 2018-09-04 PROCEDURE — 97112 NEUROMUSCULAR REEDUCATION: CPT

## 2018-09-04 NOTE — PROGRESS NOTES
Daily Note     Today's date: 2018  Patient name: Asif Warren  : 1960  MRN: [id number]  Referring provider: Antonette Bundy DPM  Dx:   Encounter Diagnosis     ICD-10-CM    1  Left ankle pain, unspecified chronicity M25 572                 Subjective: Pt reports being more stationary this past weekend and her ankle has been really good, but does continue to report having some swelling  Objective: See treatment diary below    Precautions: depression, HTN, cancer, anxiety     Daily Treatment Diary      Manual           Ankle PROM   10 min 10 min  5 min 10 min  10 min 10 min                                                                                                               Exercise Diary           Recumbent bike   nv 5 min  7 min 7 min  7 min 7 min         Ankle pump   5"x10 5"x15  5" 20x 5"x20  5''x8 5"x20         Ankle IV/EV   5"x10 ea 5"x15 ea  15x each 5"x20 ea  5''x20 5"x20         Active heel slide flexion   5"x10    5" 15x  5"x15  5''15 5"x20         SAQ   5"x10 5"x15  5" 20x 5"x20  5''20 5"x20         BAPS   nv L2 x10 ea dir  L2 10 each  L2 x10 ea  L2 x15  each  L2 x15 ea         Perturbations with PT supine   15"x3 15"x3  15x 15"x3  20''x3 20"x3                                                                                                                                                                                                                                                                                                                                       Modalities           Ice L ankle and fibular head 10 min 10 min 10 min  10 min  10 min  10  min  10 min                                                                  Assessment: Patient tolerated treatment well   Pt muscle guards during manual therapy causing some pain with inv/ev, when relaxed patient is painfree with PROM  Pt c/o knee crepitus with SAQ limiting progression  Plan: Progress treatment as tolerated  Pt was treated via unsupervised time from 9:02 - 9:03 am and was 1:1 with treating clinician for rest of session

## 2018-09-06 ENCOUNTER — APPOINTMENT (OUTPATIENT)
Dept: PHYSICAL THERAPY | Facility: REHABILITATION | Age: 58
End: 2018-09-06
Payer: COMMERCIAL

## 2018-09-07 ENCOUNTER — EVALUATION (OUTPATIENT)
Dept: PHYSICAL THERAPY | Facility: REHABILITATION | Age: 58
End: 2018-09-07
Payer: COMMERCIAL

## 2018-09-07 DIAGNOSIS — M25.572 LEFT ANKLE PAIN, UNSPECIFIED CHRONICITY: Primary | ICD-10-CM

## 2018-09-07 PROCEDURE — 97140 MANUAL THERAPY 1/> REGIONS: CPT | Performed by: PHYSICAL THERAPIST

## 2018-09-07 PROCEDURE — G8978 MOBILITY CURRENT STATUS: HCPCS

## 2018-09-07 PROCEDURE — G8979 MOBILITY GOAL STATUS: HCPCS

## 2018-09-07 PROCEDURE — 97110 THERAPEUTIC EXERCISES: CPT | Performed by: PHYSICAL THERAPIST

## 2018-09-07 NOTE — PROGRESS NOTES
PT Re-Evaluation    Today's date: 2018  Patient name: Thom Cano  : 1960  MRN: [id number]  Referring provider: Kate Mitchell MD  Dx:   Encounter Diagnosis     ICD-10-CM    1  Left ankle pain, unspecified chronicity M25 572 PT plan of care cert/re-cert       Start Time: 919  Stop Time: 1017  Total time in clinic (min): 58 minutes    Assessment  Impairments: abnormal gait, abnormal or restricted ROM, activity intolerance, impaired balance and impaired physical strength    Assessment details: Patient is a 62year old female that presents with left ankle/lower leg pain  Patients reports 50-60% improvement with skilled physical therapy  Patient's FOTO improved by 4 points to a 71/100  Patient reports improvement with negotiation of stairs, ambulation and ADLs  Patient notes moderate difficulty with ambulation more than a mile, getting on and off the bus and completing her housework secondary to impairments  Patient has made good progress towards goals established at physical therapy  Patient would benefit from skilled physical therapy services to further develop her ankle strength, ROM and proprioception to maximize her function  Understanding of Dx/Px/POC: good   Prognosis: fair  Prognosis details: Patient has co-morbidities such as depression that may limit her progression in therapy    Goals  Impairment:  1  Patient will reports 50% reduction in pain in 4 weeks to maximize function  -PARTIALLY MET   2  Patient will improve strength to 4/5 in all planes to maximize function  PARTIALLY MET  3  Patient will improve ROM to [facility] in 4 weeks to maximize function  -PARTIALLY MET    Functional:  1  Patient will improve FOTO by 5 points to 72/100 in 4 weeks to maximize function  -PARTIALLY MET  2  Patient will be independent with HEP in 4 weeks to maximize function  MET  3  Patient will report no difficulty with ambulation in 4 weeks to maximize function  -PARTIALLY MET  4   Patient will report no difficulty with housework in 4 weeks to maximize function  -PARTIALLY MET  5  Patient will report no difficulty with negotiation of stairs in 4 weeks to maximize function  -PARTIALLY MET      Plan  Patient would benefit from: skilled physical therapy  Planned modality interventions: cryotherapy  Planned therapy interventions: manual therapy, therapeutic exercise, strengthening, patient education, balance, gait training and home exercise program  Frequency: 2x week  Duration in visits: 8  Duration in weeks: 4  Treatment plan discussed with: patient  Plan details: Patient will be a RE in 4 weeks  Subjective Evaluation    History of Present Illness  Mechanism of injury: Patient reports a history of left ankle pain for about two years  Patient reports rolling her ankle frequently on uneven sidewalks as she walks  Her pain has been especially bad for about 4-5 months  Patient reports the pain starting in the lateral ankle and radiating up to her knee  Patient reports no recent lumbar spine or pain proximal to her knee  Patient reports difficulty with ambulation, negotiation of stairs, and completing her housework  Patient also notes swelling at her ankle  Pain  Current pain ratin  At best pain ratin  At worst pain ratin  Location: lateral ankle  Quality: burning  Relieving factors: rest and ice  Aggravating factors: walking and stair climbing  Progression: improved    Social Support  Lives with: alone      Diagnostic Tests  X-ray: normal  Treatments  Current treatment: physical therapy  Patient Goals  Patient goals for therapy: decreased pain          Objective     Tenderness   Left Knee   Tenderness in the fibular head  Left Ankle/Foot   Tenderness in the anterior talofibular ligament, fibula, first metatarsal head, navicular and plantar fascia  No tenderness in the Achilles insertion, fifth metatarsal base, lateral malleolus, medial malleolus, peroneal tendon and proximal Achilles       Right Ankle/Foot   No tenderness  Additional Tenderness Details  Cuboid (+) tenderness      Active Range of Motion     Lumbar   Flexion: WFL  Extension: WFL  Left Knee   Flexion: [facility]    Additional Active Range of Motion Details  No change in ankle symptoms with lumbar ROM  Increased pain with posterior and anterior fibular head glide with knee flexion    Passive Range of Motion   Left Ankle/Foot    Dorsiflexion (ke): -5 degrees with pain  Dorsiflexion (kf): 5 degrees   Plantar flexion: WFL  Inversion: 35 degrees with pain  Eversion: 25 degrees   Great toe extension: WFL    Right Ankle/Foot    Dorsiflexion (ke): 0 degrees   Plantar flexion: WFL  Inversion: WFL  Eversion: WFL  Great toe extension: WFL    Strength/Myotome Testing     Left Hip   Planes of Motion   Flexion: 3+ (Pain )    Right Hip   Planes of Motion   Flexion: 3+    Left Knee   Flexion: 4  Extension: 4    Right Knee   Flexion: 4  Extension: 4    Left Ankle/Foot   Dorsiflexion: 4+ (pain)  Plantar flexion: 4  Inversion: 4  Eversion: 3+    Right Ankle/Foot   Dorsiflexion: 4+  Plantar flexion: 4  Inversion: 4  Eversion: 3+    Ambulation     Ambulation: Level Surfaces   Ambulation without assistive device: independent    Observational Gait   Gait: antalgic   Decreased walking speed and stride length     Left foot contact pattern: foot flat  Right foot contact pattern: foot flat      Flowsheet Rows      Most Recent Value   PT/OT G-Codes   Current Score  71   Projected Score  72   FOTO information reviewed  Yes   Assessment Type  Re-evaluation   G code set  Mobility: Walking & Moving Around   Mobility: Walking and Moving Around Current Status ()  CJ   Mobility: Walking and Moving Around Goal Status ()  CJ       Precautions: depression, HTN, cancer, anxiety     Daily Treatment Diary      Manual  8/9 8/13 8/16 8/21 8/23 8/27 9/4 9/7       Ankle PROM   10 min 10 min  5 min 10 min  10 min 10 min np       Measurements                25 min                                                                                     Exercise Diary  8/9 8/13 8/16 8/21 8/23 8/27 9/4 9/7       Recumbent bike   nv 5 min  7 min 7 min  7 min 7 min  8 min       Ankle pump   5"x10 5"x15  5" 20x 5"x20  5''x8 5"x20  np        Ankle IV/EV   5"x10 ea 5"x15 ea  15x each 5"x20 ea  5''x20 5"x20  np       Active heel slide flexion   5"x10    5" 15x  5"x15  5''15 5"x20  np       SAQ   5"x10 5"x15  5" 20x 5"x20  5''20 5"x20  np       BAPS   nv L2 x10 ea dir  L2 10 each  L2 x10 ea  L2 x15  each  L2 x15 ea L2 x25  Ea, CW/CCW       Perturbations with PT supine   15"x3 15"x3  15x 15"x3  20''x3 20"x3  np        Gastroc stretch on step                 30''x3 ea        [employer]                  RYR L2  [place]     Ankle 4-way                   yellow [place], [place]                                                                                                                                                                                                                                                           Modalities  8/9 8/13 8/16 8/21 8/23 8/27 9/4 9/7       Ice L ankle and fibular head 10 min 10 min 10 min  10 min  10 min  10  min  10 min  10  min                                                          Patient treated by CHRISTIAN Buck under direct supervision of Eduardo Ocampo DPT    Patient was unsupervised from 9:57-10:07, but 1:1 for the remainder of the session

## 2018-09-10 ENCOUNTER — OFFICE VISIT (OUTPATIENT)
Dept: PHYSICAL THERAPY | Facility: REHABILITATION | Age: 58
End: 2018-09-10
Payer: COMMERCIAL

## 2018-09-10 DIAGNOSIS — M25.572 LEFT ANKLE PAIN, UNSPECIFIED CHRONICITY: Primary | ICD-10-CM

## 2018-09-10 PROCEDURE — 97110 THERAPEUTIC EXERCISES: CPT

## 2018-09-10 NOTE — PROGRESS NOTES
Daily Note     Today's date: 9/10/2018  Patient name: You Ball  : 1960  MRN: [id number]  Referring provider: Harvey Peterson MD  Dx:   Encounter Diagnosis     ICD-10-CM    1   Left ankle pain, unspecified chronicity M25 572                   Subjective: Pt reports I have no pain arriving to PT this AM       Objective: See treatment diary below    Precautions: depression, HTN, cancer, anxiety     Daily Treatment Diary      Manual  8/9 8/13 8/16  8/21  8/23  8/27  9/4  9/7  9/10     Ankle PROM   10 min 10 min  5 min 10 min  10 min 10 min np 8 min     Measurements                25 min                                                                                     Exercise Diary  8/9 8/13 8/16  8/21  8/23  8/27 9/4  9/7  9/10     Recumbent bike   nv 5 min  7 min 7 min  7 min 7 min  8 min  8 min     Ankle pump   5"x10 5"x15  5" 20x 5"x20  5''x8 5"x20  np   np     Ankle IV/EV   5"x10 ea 5"x15 ea  15x each 5"x20 ea  5''x20 5"x20  np  np     Active heel slide flexion   5"x10    5" 15x  5"x15  5''15 5"x20  np  np     SAQ   5"x10 5"x15  5" 20x 5"x20  5''20 5"x20  np  np     BAPS   nv L2 x10 ea dir  L2 10 each  L2 x10 ea  L2 x15  each  L2 x15 ea L2 x25  Ea, CW/CCW  L2 x25 ea     Perturbations with PT supine   15"x3 15"x3  15x 15"x3  20''x3 20"x3  np  20"x3      Gastroc stretch on step                 30''x3 ea  30"x3 ea      Pilates Reformer HR                  RY L2 x10     Ankle 4-way                  ytb x10 ea                                                                                                                                                                                                                                                           Modalities    9/10     Ice L ankle and fibular head 10 min 10 min 10 min  10 min  10 min  10  min  10 min  10  min  10 min                                                     Assessment: Tolerated treatment well  Initiated HR on reformer in which patient has difficulty understanding motion, needs V&T cueing to ensure proper motion is achieved  Pt  muscle guards with DF PROM, needs cues to relax to prevent pain otherwise denies sx's throughout  Patient would benefit from continued PT      Plan: Progress treatment as tolerated

## 2018-09-13 ENCOUNTER — OFFICE VISIT (OUTPATIENT)
Dept: PHYSICAL THERAPY | Facility: REHABILITATION | Age: 58
End: 2018-09-13
Payer: COMMERCIAL

## 2018-09-13 DIAGNOSIS — M25.572 LEFT ANKLE PAIN, UNSPECIFIED CHRONICITY: Primary | ICD-10-CM

## 2018-09-13 PROCEDURE — 97110 THERAPEUTIC EXERCISES: CPT

## 2018-09-13 NOTE — PROGRESS NOTES
Daily Note     Today's date: 2018  Patient name: Radha Engel  : 1960  MRN: [id number]  Referring provider: Abril Martell MD  Dx:   Encounter Diagnosis     ICD-10-CM    1  Left ankle pain, unspecified chronicity M25 572                   Subjective: Pt reports continuing to rivero a cold, but has no foot/ankle pain today        Objective: See treatment diary below    Precautions: depression, HTN, cancer, anxiety     Daily Treatment Diary      Manual  8/9 8/13 8/16  8/21  8/23  8/27  9/4  9/7  9/10  9/13   Ankle PROM   10 min 10 min  5 min 10 min  10 min 10 min np 8 min  5 min   Measurements                25 min                                                                                     Exercise Diary  8/9 8/13 8/16  8/21  8/23  8/27 9/4  9/7  9/10  9/13   Recumbent bike   nv 5 min  7 min 7 min  7 min 7 min  8 min  8 min 8 min   Ankle pump   5"x10 5"x15  5" 20x 5"x20  5''x8 5"x20  np   np  x20    Ankle IV/EV   5"x10 ea 5"x15 ea  15x each 5"x20 ea  5''x20 5"x20  np  np  x20   Active heel slide flexion   5"x10    5" 15x  5"x15  5''15 5"x20  np  np  5"x10   SAQ   5"x10 5"x15  5" 20x 5"x20  5''20 5"x20  np  np  5"x20   BAPS   nv L2 x10 ea dir  L2 10 each  L2 x10 ea  L2 x15  each  L2 x15 ea L2 x25  Ea, CW/CCW  L2 x25 ea  L2 x25 ea   Perturbations with PT supine   15"x3 15"x3  15x 15"x3  20''x3 20"x3  np  20"x3  20"x3    Gastroc stretch on step                 30''x3 ea  30"x3 ea  30"X3    Pilates Reformer HR                  RY L2 x10 RY L2 x10   Ankle 4-way                  ytb x10 ea  ytb x15 ea                                                                                                                                                                                                                                                         Modalities  8/9 8/13 8/16  8/21  8/23  8/27  9/4  9/7  9/10  9/13   Ice L ankle and fibular head 10 min 10 min 10 min  10 min  10 min  10  min  10 min  10  min  10 min  np                                                   Assessment: Tolerated treatment well  Pt cont to muscle guard with manual therapy in which she has been when guarding is present and diminishes when able to relax  Pt has some restrictions into DF actively and passively, all other directions wfl's during PROM  Pt deferred ice as she has no pain and was able to complete all TE prior to leaving PT  Patient would benefit from continued PT      Plan: Progress treatment as tolerated  Pt was treated via unsupervised time from 9:03 - 9:10 am and was 1:1 with treating clinician for rest of session

## 2018-09-17 ENCOUNTER — OFFICE VISIT (OUTPATIENT)
Dept: PHYSICAL THERAPY | Facility: REHABILITATION | Age: 58
End: 2018-09-17
Payer: COMMERCIAL

## 2018-09-17 DIAGNOSIS — M25.572 LEFT ANKLE PAIN, UNSPECIFIED CHRONICITY: Primary | ICD-10-CM

## 2018-09-17 PROCEDURE — 97110 THERAPEUTIC EXERCISES: CPT | Performed by: PHYSICAL THERAPIST

## 2018-09-17 PROCEDURE — 97112 NEUROMUSCULAR REEDUCATION: CPT | Performed by: PHYSICAL THERAPIST

## 2018-09-17 NOTE — PROGRESS NOTES
Daily Note     Today's date: 2018  Patient name: You Ball  : 1960  MRN: [id number]  Referring provider: Harvey Peterson MD  Dx:   Encounter Diagnosis     ICD-10-CM    1  Left ankle pain, unspecified chronicity M25 572                   Subjective: Patient notes she had a "beautfiul weekend" and spent a whole day standing and dancing  She reports that she has no ankle or foot pain today  Bee Cohen reports that she feels a stretch in her calf while moving her ankle in PROM for dorsi flexion  Objective: See treatment diary below  Precautions: depression, HTN, cancer, anxiety     Daily Treatment Diary      Manual  9/17 8/13 8/16  8/21  8/23  8/27  9/4  9/7  9/10  9/13   Ankle PROM  5 min 10 min 10 min  5 min 10 min  10 min 10 min np 8 min  5 min   Measurements                25 min                                                                                     Exercise Diary  9/17 8/13 8/16  8/21  8/23  8/27 9/4  9/7  9/10  9/13   Recumbent bike 8 min nv 5 min  7 min 7 min  7 min 7 min  8 min  8 min 8 min   Ankle pump  x20 5"x10 5"x15  5" 20x 5"x20  5''x8 5"x20  np   np  x20    Ankle IV/EV  x20 5"x10 ea 5"x15 ea  15x each 5"x20 ea  5''x20 5"x20  np  np  x20   Active heel slide flexion  5''x15 5"x10    5" 15x  5"x15  5''15 5"x20  np  np  5"x10   SAQ  5''x20 5"x10 5"x15  5" 20x 5"x20  5''20 5"x20  np  np  5"x20   BAPS  L2 x25 ea L3  NV?? L2 x10 ea dir  L2 10 each  L2 x10 ea  L2 x15  each  L2 x15 ea L2 x25  Ea, CW/CCW  L2 x25 ea  L2 x25 ea   Perturbations with PT supine  20''x3 15"x3 15"x3  15x 15"x3  20''x3 20"x3  np  20"x3  20"x3   Gastroc stretch on step   30''x3              30''x3 ea  30"x3 ea  30"X3    Pilates Reformer HR  RY L2 x15  RYR  NV              RY L2 x10 RY L2 x10   Ankle 4-way   yellow  x20 ea  Red TB NV? ?             ytb x10 ea  ytb x15 ea                                                                                                                                                                                                                                                         Modalities  9/17 8/13 8/16  8/21  8/23  8/27  9/4  9/7  9/10  9/13   Ice L ankle and fibular head declined for home 10 min 10 min  10 min  10 min  10  min  10 min  10  min  10 min  np                                                         Assessment: Patient tolerated treatment fair  Patient presented with increased tension while performing her gastroc stretch,but each time she was able to increase her dorsi-flexion ROM by leaning farther forward  She was able to perform the heel raises with improved form  Patient exhibited improved ankle ROM while performing PROM  Patient will be progressed next visit with increased level on the baps board and will be moved to the red theraband  Patient would benefit from continued PT to further develop her strength and ROM to maximize her function  Plan: Progress treatment as tolerated          Patient treated by CHRISTIAN Kelly under direct supervision of Khoa Coello DPT

## 2018-09-20 ENCOUNTER — OFFICE VISIT (OUTPATIENT)
Dept: PHYSICAL THERAPY | Facility: REHABILITATION | Age: 58
End: 2018-09-20
Payer: COMMERCIAL

## 2018-09-20 DIAGNOSIS — M25.572 LEFT ANKLE PAIN, UNSPECIFIED CHRONICITY: Primary | ICD-10-CM

## 2018-09-20 PROCEDURE — 97110 THERAPEUTIC EXERCISES: CPT

## 2018-09-20 PROCEDURE — 97112 NEUROMUSCULAR REEDUCATION: CPT

## 2018-09-20 NOTE — PROGRESS NOTES
Daily Note     Today's date: 2018  Patient name: You Ball  : 1960  MRN: [id number]  Referring provider: Harvey Peterson MD  Dx:   Encounter Diagnosis     ICD-10-CM    1  Left ankle pain, unspecified chronicity M25 572                   Subjective: Patient reports L ankle is improving, patient notes occasional soreness but has been pain free since last visit        Objective: See treatment diary below  Precautions: depression, HTN, cancer, anxiety     Daily Treatment Diary      Manual  9/17 9/20 8/16  8/21  8/23  8/27  9/4  9/7  9/10  9/13   Ankle PROM  5 min 5 min 10 min  5 min 10 min  10 min 10 min np 8 min  5 min   Measurements                25 min                                                                                     Exercise Diary  9/17 9/20 8/16  8/21  8/23  8/27 9/4  9/7  9/10  9/13   Recumbent bike 8 min 8 min 5 min  7 min 7 min  7 min 7 min  8 min  8 min 8 min   Ankle pump  x20 x20  5"x15  5" 20x 5"x20  5''x8 5"x20  np   np  x20    Ankle IV/EV  x20 x20 ea 5"x15 ea  15x each 5"x20 ea  5''x20 5"x20  np  np  x20   Active heel slide flexion  5''x15 5"x15    5" 15x  5"x15  5''15 5"x20  np  np  5"x10   SAQ  5''x20  5"x20 5"x15  5" 20x 5"x20  5''20 5"x20  np  np  5"x20   BAPS  L2 x25 ea L3 x10 ea L2 x10 ea dir  L2 10 each  L2 x10 ea  L2 x15  each  L2 x15 ea L2 x25  Ea, CW/CCW  L2 x25 ea  L2 x25 ea   Perturbations with PT supine  20''x3 20"x3 15"x3  15x 15"x3  20''x3 20"x3  np  20"x3  20"x3   Gastroc stretch on step   30''x3  30"x3            30''x3 ea  30"x3 ea  30"X3    Pilates Reformer HR  RY L2 x15  RYR L2 x15                RY L2 x10 RY L2 x10   Ankle 4-way   yellow  x20 ea  rtb x15 ea             ytb x10 eveline  ytjam x15 eveline                                                                                                                                                                                                                                                         Modalities 9/17 9/20 8/16  8/21  8/23  8/27  9/4  9/7  9/10  9/13   Ice L ankle and fibular head declined for home  np 10 min  10 min  10 min  10  min  10 min  10  min  10 min  np                                                         Assessment: Patient tolerated treatment well  Patients PROM is improving, does continue to muscle guard mildly at times in fear of pain but when questioned denies pain  Progressed ankle ROM to red band with good challenge  Pt has improved stability with BAPS board progressing to L3  Patient would benefit from continued PT       Plan: Progress treatment as tolerated  Pt was treated via unsupervised time from 9:36 - 9:38 am and was 1:1 with treating clinician for rest of session

## 2018-09-26 ENCOUNTER — OFFICE VISIT (OUTPATIENT)
Dept: PHYSICAL THERAPY | Facility: REHABILITATION | Age: 58
End: 2018-09-26
Payer: COMMERCIAL

## 2018-09-26 DIAGNOSIS — M25.572 LEFT ANKLE PAIN, UNSPECIFIED CHRONICITY: Primary | ICD-10-CM

## 2018-09-26 PROCEDURE — 97110 THERAPEUTIC EXERCISES: CPT | Performed by: PHYSICAL THERAPIST

## 2018-09-26 PROCEDURE — 97112 NEUROMUSCULAR REEDUCATION: CPT | Performed by: PHYSICAL THERAPIST

## 2018-09-26 NOTE — PROGRESS NOTES
Daily Note     Today's date: 2018  Patient name: Nila Cockayne  : 1960  MRN: [id number]  Referring provider: Wendi Ayala MD  Dx:   Encounter Diagnosis     ICD-10-CM    1  Left ankle pain, unspecified chronicity M25 572        Start Time: 903  Stop Time: 09  Total time in clinic (min): 37 minutes    Subjective: Patient reports she is feeling "okay" today  She notes that she had a quiet weekend and was by herself for most of it  She reports that she walked on Friday down a few blocks and had no pain  atient reports HEP compliance         Objective: See treatment diary below  Precautions: depression, HTN, cancer, anxiety     Daily Treatment Diary      Manual  9/17 9/20 9/26  8/21  8/23  8/27  9/4  9/7  9/10  9/13   Ankle PROM  5 min 5 min   5 min 10 min  10 min 10 min np 8 min  5 min   Measurements                25 min                                                                                     Exercise Diary  9/17 9/20 9/26  8/21  8/23  8/27 9/4  9/7  9/10  9/13   Recumbent bike 8 min 8 min 6 min  7 min 7 min  7 min 7 min  8 min  8 min 8 min   Ankle pump  x20 x20  x20  5" 20x 5"x20  5''x8 5"x20  np   np  x20    Ankle IV/EV  x20 x20 ea x20 ea  15x each 5"x20 ea  5''x20 5"x20  np  np  x20   Active heel slide flexion  5''x15 5"x15  5''x20  5" 15x  5"x15  5''15 5"x20  np  np  5"x10   SAQ  5''x20  5"x20 5''x20  5" 20x 5"x20  5''20 5"x20  np  np  5"x20   BAPS  L2 x25 ea L3 x10 ea L3 x15 ea  L2 10 each  L2 x10 ea  L2 x15  each  L2 x15 ea L2 x25  Ea, CW/CCW  L2 x25 ea  L2 x25 ea   Perturbations with PT supine  20''x3 20"x3 45''x3  15x 15"x3  20''x3 20"x3  np  20"x3  20"x3   Gastroc stretch on step   30''x3  30"x3  30''x3          30''x3 ea  30"x3 ea  30"X3    Pilates Reformer HR  RY L2 x15  RYR L2 x15     RYRG  L2 x15            RY L2 x10 RY L2 x10   Ankle 4-way   yellow  x20 ea  rtb x15 ea  gtb  x30 ea           ytb x10 ea  ytb x15 ea                                                                                                                                                                                                                                                         Modalities  9/17 9/20 9/26  8/21  8/23  8/27  9/4  9/7  9/10  9/13   Ice L ankle and fibular head declined for home  np 4 min  10 min  10 min  10  min  10 min  10  min  10 min  np                                                          Assessment: Patient tolerated treatment well  Patient exhibited increased gastroc ROM during stretch at step  She was able to lean forward more and provided increased OP during gastroc stretch  Patient demonstrated proper technique while performing ankle 4-way with progression to green band and increased repetitions  Patient exhibited proper technique while performing HEP exercises  Treatment was limited due to patient having to 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

## 2018-09-28 ENCOUNTER — OFFICE VISIT (OUTPATIENT)
Dept: PHYSICAL THERAPY | Facility: REHABILITATION | Age: 58
End: 2018-09-28
Payer: COMMERCIAL

## 2018-09-28 DIAGNOSIS — M25.572 LEFT ANKLE PAIN, UNSPECIFIED CHRONICITY: Primary | ICD-10-CM

## 2018-09-28 PROCEDURE — 97110 THERAPEUTIC EXERCISES: CPT | Performed by: PHYSICAL THERAPIST

## 2018-09-28 PROCEDURE — 97112 NEUROMUSCULAR REEDUCATION: CPT | Performed by: PHYSICAL THERAPIST

## 2018-09-28 NOTE — PROGRESS NOTES
Daily Note     Today's date: 2018  Patient name: Pierce Olguin  : 1960  MRN: [id number]  Referring provider: Yun Moran MD  Dx:   Encounter Diagnosis     ICD-10-CM    1  Left ankle pain, unspecified chronicity M25 572        Start Time: 0950  Stop Time: 1041  Total time in clinic (min): 51 minutes    Subjective: Patient reports that she is feeling much better today  She notes that she has been having minimal pain in her foot  She states that she is feeling much happier today and is excited for her weekend  Patient notes that she enjoys coming here and it gives her something to do  Patient reports HEP compliance         Objective: See treatment diary below  Precautions: depression, HTN, cancer, anxiety     Daily Treatment Diary      Manual  9/17 9/20 9/26  9/28  8/23  8/27  9/4  9/7  9/10  9/13   Ankle PROM  5 min 5 min  np  np 10 min  10 min 10 min np 8 min  5 min   Measurements                25 min                                                                                     Exercise Diary  9/17 9/20 9/26  9/28  8/23  8/27 9/4  9/7  9/10  9/13   Recumbent bike 8 min 8 min 6 min 8 min 7 min  7 min 7 min  8 min  8 min 8 min   Ankle pump  x20 x20  x20  x30 ea 5"x20  5''x8 5"x20  np   np  x20    Ankle IV/EV  x20 x20 ea x20 ea  x30 ea 5"x20 ea  5''x20 5"x20  np  np  x20   Active heel slide flexion  5''x15 5"x15  5''x20 5''x20  5"x15  5''15 5"x20  np  np  5"x10   SAQ  5''x20  5"x20 5''x20  5''x20 5"x20  5''20 5"x20  np  np  5"x20   BAPS  L2 x25 ea L3 x10 ea L3 x15 ea L5 x25 ea  L2 x10 ea  L2 x15  each  L2 x15 ea L2 x25  Ea, CW/CCW  L2 x25 ea  L2 x25 ea   Perturbations with PT supine  20''x3 20"x3 45''x3  30''x3 15"x3  20''x3 20"x3  np  20"x3  20"x3   Gastroc stretch on step   30''x3  30"x3  30''x3 30''x4        30''x3 ea  30"x3 ea  30"X3    Pilates Reformer HR  RY L2 x15  RYR L2 x15     RYRG  L2 x15 RYRG  L2 x20          RY L2 x10 RY L2 x10   Ankle 4-way   yellow  x20 ea  rtb x15 ea  gtb  x30 ea  gtb x30 ea         ytb x10 ea  ytb x15 ea                                                                                                                                                                                                                                                         Modalities  9/17 9/20 9/26  9/28  8/23  8/27  9/4  9/7  9/10  9/13   Ice L ankle and fibular head declined for home  np 4 min 10 min  10 min  10  min  10 min  10  min  10 min  np                                                          Assessment: Patient tolerated treatment well  Patient required verbal cues throughout treatment for appropriate speed of performance with exercises  Patient reports "muscle tightness" when performing gastroc stretch, patient was educated on the difference from pain and a "muscle stretch"  Patient required tactile cuing to prevent knee movement during resisted band exercises when moving into eversion  Patient demonstrated good recall for HEP exercises  Patient had no pain with resisted perturbation training and is progressing well with this exercise  Patient would benefit from continued PT to further develop strength, ROM and proprioception to maximize function  Plan: Progress treatment as tolerated          Patient treated by CHRISTIAN Saunders under direct supervision of Jeffrey Pineda DPT from 9:50-10:15 AM and the remainder of the session under direct supervision of Ed Beltre DPT

## 2018-10-01 ENCOUNTER — OFFICE VISIT (OUTPATIENT)
Dept: PHYSICAL THERAPY | Facility: REHABILITATION | Age: 58
End: 2018-10-01
Payer: COMMERCIAL

## 2018-10-01 DIAGNOSIS — M25.572 LEFT ANKLE PAIN, UNSPECIFIED CHRONICITY: Primary | ICD-10-CM

## 2018-10-01 PROCEDURE — 97112 NEUROMUSCULAR REEDUCATION: CPT

## 2018-10-01 PROCEDURE — 97110 THERAPEUTIC EXERCISES: CPT

## 2018-10-01 NOTE — PROGRESS NOTES
Daily Note     Today's date: 10/1/2018  Patient name: Katelynn Thompson  : 1960  MRN: [id number]  Referring provider: Anais Fulton MD  Dx:   Encounter Diagnosis     ICD-10-CM    1  Left ankle pain, unspecified chronicity M25 572                   Subjective:  Pt reports increasing ambulation to walk to bus stop that is further away with good tolerance and reports "my foot feels better"  Pt notes limitations continue to be present for prolonged distances        Objective: See treatment diary below  Precautions: depression, HTN, cancer, anxiety     Daily Treatment Diary      Manual  9/17 9/20 9/26  9/28  10/1  8/27  9/4  9/7  9/10  9/13   Ankle PROM  5 min 5 min  np  np  5 min  10 min 10 min np 8 min  5 min   Measurements                25 min                                                                                     Exercise Diary  9/17 9/20 9/26  9/28  10/1  8/27 9/4  9/7  9/10  9/13   Recumbent bike 8 min 8 min 6 min 8 min 8 min  7 min 7 min  8 min  8 min 8 min   Ankle pump  x20 x20  x20  x30 ea x30 ea  5''x8 5"x20  np   np  x20    Ankle IV/EV  x20 x20 ea x20 ea  x30 ea x30 ea  5''x20 5"x20  np  np  x20   Active heel slide flexion  5''x15 5"x15  5''x20 5''x20 5"x20  5''15 5"x20  np  np  5"x10   SAQ  5''x20  5"x20 5''x20  5''x20 1# 5"x15  5''20 5"x20  np  np  5"x20   BAPS  L2 x25 ea L3 x10 ea L3 x15 ea L5 x25 ea  L5 x25 ea  L2 x15  each  L2 x15 ea L2 x25  Ea, CW/CCW  L2 x25 ea  L2 x25 ea   Perturbations with PT supine  20''x3 20"x3 45''x3  30''x3 45"x3  20''x3 20"x3  np  20"x3  20"x3   Gastroc stretch on step   30''x3  30"x3  30''x3 30''x4  30"x4      30''x3 ea  30"x3 ea  30"X3    Pilates Reformer HR  RY L2 x15  RYR L2 x15     RYRG  L2 x15 RYRG  L2 x20 RYRG L2 x20        RY L2 x10 RY L2 x10   Ankle 4-way   yellow  x20 ea  rtb x15 ea  gtb  x30 ea  gtb x30 ea  btb x15 ea       ytb x10 ea  ytb x15 ea                                                                                                                                                                                                                                                         Modalities  9/17 9/20 9/26  9/28  10/1  8/27  9/4  9/7  9/10  9/13   Ice L ankle and fibular head declined for home  np 4 min 10 min  np  10  min  10 min  10  min  10 min  np                                                          Assessment: Patient tolerated treatment well  Pt notes pulling present with DF ROM, focused PROM on DF as patient demonstrates ROM wfl's in all other directions  Pt needs assistance during BAPS board to maintain proper plane of motion without hip compensation  Pt also needs cues to prevent hip IR/ER with resisted inv/ev  Pt deferred ice to perform at home  Patient would benefit from continued PT  Plan: Progress treatment as tolerated

## 2018-10-04 ENCOUNTER — EVALUATION (OUTPATIENT)
Dept: PHYSICAL THERAPY | Facility: REHABILITATION | Age: 58
End: 2018-10-04
Payer: COMMERCIAL

## 2018-10-04 DIAGNOSIS — M25.572 LEFT ANKLE PAIN, UNSPECIFIED CHRONICITY: Primary | ICD-10-CM

## 2018-10-04 PROCEDURE — G8980 MOBILITY D/C STATUS: HCPCS | Performed by: PHYSICAL THERAPIST

## 2018-10-04 PROCEDURE — 97140 MANUAL THERAPY 1/> REGIONS: CPT | Performed by: PHYSICAL THERAPIST

## 2018-10-04 PROCEDURE — G8979 MOBILITY GOAL STATUS: HCPCS | Performed by: PHYSICAL THERAPIST

## 2018-10-04 PROCEDURE — 97110 THERAPEUTIC EXERCISES: CPT | Performed by: PHYSICAL THERAPIST

## 2018-10-04 NOTE — PROGRESS NOTES
PT Discharge    Today's date: 10/4/2018  Patient name: Jesus Yip  : 1960  MRN: [id number]  Referring provider: Concha Swain MD  Dx:   Encounter Diagnosis     ICD-10-CM    1  Left ankle pain, unspecified chronicity M25 [id number]        Start Time: 09  Stop Time: 944  Total time in clinic (min): 44 minutes    Assessment  Impairments: abnormal gait, abnormal or restricted ROM, activity intolerance, impaired balance and impaired physical strength    Assessment details: Patient is a 62year old female that presents with left ankle/lower leg pain  Patients reports 80% improvement with skilled physical therapy  Patient's FOTO improved by 4 points to a 71/100  Patient reports improvement with negotiation of stairs, ambulation, ability to complete housework, bus transfers, and ADLs  Patient notes some continued difficulty with ambulation, negotiation of stairs, and completing her housework secondary to impairments  Patient has made good progress towards goals established at physical therapy  Patient would benefit from HEP forcontinued ankle strengthening, ROM and proprioception to maximize her function  Understanding of Dx/Px/POC: good   Prognosis: fair  Prognosis details: Patient has co-morbidities such as depression that may limit her progression in therapy    Goals  Impairment:  1  Patient will reports 50% reduction in pain in 4 weeks to maximize function  -MET   2  Patient will improve strength to 4/5 in all planes to maximize function  PARTIALLY MET  3  Patient will improve ROM to [facility] in 4 weeks to maximize function  -PARTIALLY MET    Functional:  1  Patient will improve FOTO by 5 points to 72/100 in 4 weeks to maximize function  -PARTIALLY MET  2  Patient will be independent with HEP in 4 weeks to maximize function  -MET  3  Patient will report no difficulty with ambulation in 4 weeks to maximize function  -PARTIALLY MET  4   Patient will report no difficulty with housework in 4 weeks to maximize function  -PARTIALLY MET  5  Patient will report no difficulty with negotiation of stairs in 4 weeks to maximize function  -PARTIALLY MET      Plan  Planned modality interventions: cryotherapy  Planned therapy interventions: therapeutic exercise, strengthening, balance and home exercise program  Treatment plan discussed with: patient  Plan details: Patient will be discharged to [facility] at this time  Subjective Evaluation    History of Present Illness  Mechanism of injury: Patient reports a history of left ankle pain for about two years  Patient reports rolling her ankle frequently on uneven sidewalks as she walks  Her pain has been especially bad for about 4-5 months  Patient reports the pain starting in the lateral ankle and radiating up to her knee  Patient reports no recent lumbar spine or pain proximal to her knee  Patient reports difficulty with ambulation, negotiation of stairs, and completing her housework  Patient also notes swelling at her ankle  Pain  Current pain ratin  At best pain ratin  At worst pain ratin  Location: lateral ankle  Quality: burning  Relieving factors: rest and ice  Aggravating factors: walking and stair climbing  Progression: improved    Social Support  Lives with: alone      Diagnostic Tests  X-ray: normal  Treatments  Current treatment: physical therapy  Patient Goals  Patient goals for therapy: decreased pain          Objective     Tenderness   Left Knee   Tenderness in the fibular head  Left Ankle/Foot   Tenderness in the anterior talofibular ligament, fibula, medial malleolus, navicular and plantar fascia  No tenderness in the Achilles insertion, fifth metatarsal base, first metatarsal head, lateral malleolus, peroneal tendon and proximal Achilles  Right Ankle/Foot   No tenderness       Active Range of Motion     Lumbar   Flexion: WFL  Extension: WFL  Left Knee   Flexion: [facility]    Additional Active Range of Motion Details  No change in ankle symptoms with lumbar ROM  Fibular head soreness with maximal flexion    Passive Range of Motion   Left Ankle/Foot    Dorsiflexion (ke): -5 degrees   Dorsiflexion (kf): 5 degrees   Plantar flexion: WFL  Inversion: 38 degrees with pain  Eversion: 28 degrees   Great toe extension: WFL    Right Ankle/Foot    Dorsiflexion (ke): 0 degrees   Plantar flexion: WFL  Inversion: WFL  Eversion: WFL  Great toe extension: WFL    Strength/Myotome Testing     Left Hip   Planes of Motion   Flexion: 4    Right Hip   Planes of Motion   Flexion: 4-    Left Knee   Flexion: 4  Extension: 4    Right Knee   Flexion: 4+  Extension: 4+    Left Ankle/Foot   Dorsiflexion: 5 (pain)  Plantar flexion: 5  Inversion: 4+  Eversion: 4    Right Ankle/Foot   Dorsiflexion: 5  Plantar flexion: 5  Inversion: 4+  Eversion: 4+    Ambulation     Ambulation: Level Surfaces   Ambulation without assistive device: independent    Observational Gait   Decreased walking speed and stride length     Left foot contact pattern: heel to toe  Right foot contact pattern: heel to toe    Functional Assessment     Single Leg Stance   Left: 6 seconds  Right: 20 seconds    Comments  Tandem stance: 15 seconds right anterior, 30 seconds left anterior    Precautions: depression, HTN, cancer, anxiety     Daily Treatment Diary      Manual  9/17 9/20 9/26  9/28  10/1 10/4  9/4  9/7  9/10  9/13   Ankle PROM  5 min 5 min  np  np  5 min  np 10 min np 8 min  5 min   Measurements            15 min    25 min                                                                                     Exercise Diary  9/17 9/20 9/26  9/28  10/1 10/4 9/4  9/7  9/10  9/13   Recumbent bike 8 min 8 min 6 min 8 min 8 min  8 min 7 min  8 min  8 min 8 min   Ankle pump  x20 x20  x20  x30 ea x30 ea np 5"x20  np   np  x20    Ankle IV/EV  x20 x20 ea x20 ea  x30 ea x30 ea np 5"x20  np  np  x20   Active heel slide flexion  5''x15 5"x15  5''x20 5''x20 5"x20  np 5"x20  np  np  5"x10   SAQ  5''x20  5"x20 5''x20  5''x20 1# 5"x15 np 5"x20  np  np  5"x20   BAPS  L2 x25 ea L3 x10 ea L3 x15 ea L5 x25 ea  L5 x25 ea  L2 x25  each  L2 x15 ea L2 x25  Ea, CW/CCW  L2 x25 ea  L2 x25 ea   Perturbations with PT supine  20''x3 20"x3 45''x3  30''x3 45"x3  np 20"x3  np  20"x3  20"x3   Gastroc stretch on step   30''x3  30"x3  30''x3 30''x4  30"x4  4x 30"    30''x3 ea  30"x3 ea  30"X3    Pilates Reformer HR  RY L2 x15  RYR L2 x15     RYRG  L2 x15 RYRG  L2 x20 RYRG L2 x20  RYRG L2 20x      RY L2 x10 RY L2 x10   Ankle 4-way   yellow  x20 ea  rtb x15 ea  gtb  x30 ea  gtb x30 ea  btb x15 ea  blue 5" 20x each     ytb x10 ea  ytb x15 ea                                                                                                                                                                                                                                                         Modalities  9/17 9/20 9/26  9/28  10/1 10/4  9/4  9/7  9/10  9/13   Ice L ankle and fibular head declined for home  np 4 min 10 min  np  np  10 min  10  min  10 min  np

## 2018-10-22 ENCOUNTER — OFFICE VISIT (OUTPATIENT)
Dept: FAMILY MEDICINE CLINIC | Facility: CLINIC | Age: 58
End: 2018-10-22
Payer: COMMERCIAL

## 2018-10-22 ENCOUNTER — HOSPITAL ENCOUNTER (OUTPATIENT)
Dept: RADIOLOGY | Facility: HOSPITAL | Age: 58
Discharge: HOME | End: 2018-10-22
Payer: COMMERCIAL

## 2018-10-22 VITALS
HEIGHT: 63 IN | TEMPERATURE: 96 F | SYSTOLIC BLOOD PRESSURE: 126 MMHG | BODY MASS INDEX: 29.59 KG/M2 | WEIGHT: 167 LBS | HEART RATE: 110 BPM | RESPIRATION RATE: 18 BRPM | OXYGEN SATURATION: 96 % | DIASTOLIC BLOOD PRESSURE: 80 MMHG

## 2018-10-22 DIAGNOSIS — C50.912 MALIGNANT NEOPLASM OF LEFT FEMALE BREAST, UNSPECIFIED ESTROGEN RECEPTOR STATUS, UNSPECIFIED SITE OF BREAST (HCC): ICD-10-CM

## 2018-10-22 DIAGNOSIS — R05.9 COUGH: ICD-10-CM

## 2018-10-22 DIAGNOSIS — R73.9 HYPERGLYCEMIA: ICD-10-CM

## 2018-10-22 DIAGNOSIS — Z23 NEED FOR PNEUMOCOCCAL VACCINATION: ICD-10-CM

## 2018-10-22 DIAGNOSIS — F32.A DEPRESSION, UNSPECIFIED DEPRESSION TYPE: ICD-10-CM

## 2018-10-22 DIAGNOSIS — F41.9 ANXIETY: ICD-10-CM

## 2018-10-22 DIAGNOSIS — K29.70 GASTRITIS, PRESENCE OF BLEEDING UNSPECIFIED, UNSPECIFIED CHRONICITY, UNSPECIFIED GASTRITIS TYPE: ICD-10-CM

## 2018-10-22 DIAGNOSIS — E03.9 HYPOTHYROIDISM, UNSPECIFIED TYPE: Primary | ICD-10-CM

## 2018-10-22 DIAGNOSIS — E78.5 HYPERLIPIDEMIA, UNSPECIFIED HYPERLIPIDEMIA TYPE: ICD-10-CM

## 2018-10-22 DIAGNOSIS — I10 BENIGN ESSENTIAL HYPERTENSION: ICD-10-CM

## 2018-10-22 DIAGNOSIS — Z23 NEED FOR INFLUENZA VACCINATION: ICD-10-CM

## 2018-10-22 PROCEDURE — 90471 IMMUNIZATION ADMIN: CPT

## 2018-10-22 PROCEDURE — 90472 IMMUNIZATION ADMIN EACH ADD: CPT

## 2018-10-22 PROCEDURE — 90732 PPSV23 VACC 2 YRS+ SUBQ/IM: CPT

## 2018-10-22 PROCEDURE — 3008F BODY MASS INDEX DOCD: CPT | Performed by: PHYSICIAN ASSISTANT

## 2018-10-22 PROCEDURE — 90682 RIV4 VACC RECOMBINANT DNA IM: CPT

## 2018-10-22 PROCEDURE — 71046 X-RAY EXAM CHEST 2 VIEWS: CPT

## 2018-10-22 PROCEDURE — 99214 OFFICE O/P EST MOD 30 MIN: CPT

## 2018-10-22 RX ORDER — LISINOPRIL 10 MG/1
10 TABLET ORAL
Qty: 90 TABLET | Refills: 0 | Status: SHIPPED | OUTPATIENT
Start: 2018-10-22 | End: 2019-04-02 | Stop reason: SDUPTHER

## 2018-10-22 RX ORDER — LEVOTHYROXINE SODIUM 88 UG/1
88 TABLET ORAL DAILY
Qty: 90 TABLET | Refills: 0 | Status: SHIPPED | OUTPATIENT
Start: 2018-10-22 | End: 2019-01-01

## 2018-10-22 RX ORDER — BUPROPION HYDROCHLORIDE 300 MG/1
300 TABLET ORAL DAILY
Qty: 90 TABLET | Refills: 0 | Status: SHIPPED | OUTPATIENT
Start: 2018-10-22 | End: 2019-01-01 | Stop reason: SDUPTHER

## 2018-10-22 RX ORDER — ATORVASTATIN CALCIUM 40 MG/1
40 TABLET, FILM COATED ORAL DAILY
Qty: 90 TABLET | Refills: 0 | Status: SHIPPED | OUTPATIENT
Start: 2018-10-22 | End: 2019-04-02 | Stop reason: SDUPTHER

## 2018-10-22 RX ORDER — FAMOTIDINE 40 MG/1
40 TABLET, FILM COATED ORAL
Qty: 30 TABLET | Refills: 0 | Status: SHIPPED | OUTPATIENT
Start: 2018-10-22 | End: 2019-02-26 | Stop reason: SDUPTHER

## 2018-10-22 NOTE — PATIENT INSTRUCTIONS
Offered occupational therapy for her bilateral thumb/hand pain but patient prefers to hold at this time  Will follow up if symptoms increase  Flu block and Pneumovax today  Continue follow-up with Oncology as scheduled  Proceed with chest x-ray today at [facility] for cough for 3 weeks  Follow-up if any symptoms increase  Continue current medications  Follow-up in March and proceed with blood work 3-5 days prior to the appointment

## 2018-10-22 NOTE — PROGRESS NOTES
Assessment/Plan:    Patient Instructions   Offered occupational therapy for her bilateral thumb/hand pain but patient prefers to hold at this time  Will follow up if symptoms increase  Flu block and Pneumovax today  Continue follow-up with Oncology as scheduled  Proceed with chest x-ray today at [facility] for cough for 3 weeks  Follow-up if any symptoms increase  Continue current medications  Follow-up in March and proceed with blood work 3-5 days prior to the appointment  M*Modal software was used to dictate this note  It may contain errors with dictating incorrect words/spelling  Please contact provider directly for any questions  Diagnoses and all orders for this visit:    Hypothyroidism, unspecified type  -     CBC and differential  -     Comprehensive metabolic panel  -     Hemoglobin A1C  -     Lipid panel  -     TSH, 3rd generation; Future  -     levothyroxine 88 mcg tablet; Take 1 tablet (88 mcg total) by mouth daily    Benign essential hypertension  -     CBC and differential  -     Comprehensive metabolic panel  -     Hemoglobin A1C  -     Lipid panel  -     TSH, 3rd generation; Future  -     lisinopril (ZESTRIL) 10 mg tablet; Take 1 tablet (10 mg total) by mouth daily in the early morning    Hyperlipidemia, unspecified hyperlipidemia type  -     CBC and differential  -     Comprehensive metabolic panel  -     Hemoglobin A1C  -     Lipid panel  -     TSH, 3rd generation; Future  -     atorvastatin (LIPITOR) 40 mg tablet; Take 1 tablet (40 mg total) by mouth daily    Malignant neoplasm of left female breast, unspecified estrogen receptor status, unspecified site of breast (HCC)    Cough  -     XR chest pa & lateral; Future  -     CBC and differential  -     Comprehensive metabolic panel  -     Hemoglobin A1C  -     Lipid panel  -     TSH, 3rd generation;  Future    Hyperglycemia  -     CBC and differential  -     Comprehensive metabolic panel  -     Hemoglobin A1C  -     Lipid panel  - TSH, 3rd generation; Future    Need for influenza vaccination  -     influenza vaccine, [id number], quadrivalent, recombinant, PF, 0 5 mL, for patients 18 yr+ (FLUBLOK)    Need for pneumococcal vaccination  -     PNEUMOCOCCAL POLYSACCHARIDE VACCINE 23-VALENT =>3YO SQ IM    Depression, unspecified depression type  -     buPROPion (WELLBUTRIN XL) 300 mg 24 hr tablet; Take 1 tablet (300 mg total) by mouth daily    Anxiety  -     buPROPion (WELLBUTRIN XL) 300 mg 24 hr tablet; Take 1 tablet (300 mg total) by mouth daily    Gastritis, presence of bleeding unspecified, unspecified chronicity, unspecified gastritis type  -     famotidine (PEPCID) 40 MG tablet; Take 1 tablet (40 mg total) by mouth daily at bedtime as needed for indigestion or heartburn (only takes prn )          Subjective:      Patient ID: Chichi Bhakta is a 62 y o  female  Patient presents today for follow-up of thyroid/hyperglycemia/hypertension/hyperlipidemia  She is compliant with her medications  She has changed her diet to low-carbohydrate to avoid becoming diabetic  She has noticed she has lost several lb  Complains of a dry cough over the last 3 weeks  Denies any fevers or chills  Increased cough at bedtime  She also complains of bilateral thumb pain  No injury  She does notice some soreness of her hands  She does notice some intermittent swelling of her thumbs  She does continue follow-up with Oncology for past history of breast cancer  The following portions of the patient's history were reviewed and updated as appropriate:   She  has a past medical history of Anxiety; Breast cancer ([facility] [address] ); Colon polyps; Depression; Disease of thyroid gland; Diverticulosis of colon; Encounter for screening colonoscopy; Gastritis; Hemorrhoids; High cholesterol; Hypertension; Hypothyroidism; Lymphedema; S/P radiation therapy; Thyroid cancer ([address] ); and Wears glasses    She   Patient Active Problem List    Diagnosis Date Noted    Cough 10/22/2018    Left ankle pain 07/16/2018    Hyperglycemia 03/15/2018    Chronic low back pain 09/22/2017    Agoraphobia 09/22/2017    Anxiety 09/22/2017    Tinea pedis 05/10/2017    Arthralgia of multiple joints 05/10/2017    Obesity 10/07/2016    Hot flashes 10/07/2016    Gastritis 08/31/2015    Breast cancer ([facility] ) 03/30/2015    Hyperlipidemia 11/11/2014    Malignant neoplasm of left breast ([facility] ) 12/06/2013    Lymphedema 08/21/2013    Benign essential hypertension 06/26/2013    Hypothyroidism 10/06/2012    Depression 10/06/2012     She  has a past surgical history that includes Breast surgery (Bilateral); Colonoscopy; Thyroidectomy; pr colonoscopy flx dx w/collj spec when pfrmd (N/A, 12/13/2016); pr nipple/areola reconstruction (Right, 4/15/2016); pr delay breast pros after breast surg (Right, 1/22/2016); pr removal of breast capsule (Right, 1/22/2016); Breast biopsy; Incisional breast biopsy; Breast lumpectomy (Left); Mastectomy, radical (Right); and Tubal ligation  Her family history includes Cancer in her family; Cervical cancer in her sister; Diabetes in her mother; Liver disease in her brother; No Known Problems in her father; Other in her mother  She  reports that she quit smoking about 8 years ago  She smoked 0 50 packs per day  She has quit using smokeless tobacco  She reports that she drinks alcohol  She reports that she does not use drugs    Current Outpatient Prescriptions   Medication Sig Dispense Refill    anastrozole (ARIMIDEX) 1 mg tablet Take 1 tablet (1 mg total) by mouth daily 90 tablet 1    atorvastatin (LIPITOR) 40 mg tablet Take 1 tablet (40 mg total) by mouth daily 90 tablet 0    buPROPion (WELLBUTRIN XL) 300 mg 24 hr tablet Take 1 tablet (300 mg total) by mouth daily 90 tablet 0    Calcium Citrate 1040 MG TABS Take 1 tablet by mouth daily      famotidine (PEPCID) 40 MG tablet Take 1 tablet (40 mg total) by mouth daily at bedtime as needed for indigestion or heartburn (only takes prn ) 30 tablet 0    ketoconazole (NIZORAL) 2 % cream 2 (two) times a day Apply sparingly to affected area(s)  3    levothyroxine 88 mcg tablet Take 1 tablet (88 mcg total) by mouth daily 90 tablet 0    lisinopril (ZESTRIL) 10 mg tablet Take 1 tablet (10 mg total) by mouth daily in the early morning 90 tablet 0     No current facility-administered medications for this visit  Current Outpatient Prescriptions on File Prior to Visit   Medication Sig    anastrozole (ARIMIDEX) 1 mg tablet Take 1 tablet (1 mg total) by mouth daily    Calcium Citrate 1040 MG TABS Take 1 tablet by mouth daily    ketoconazole (NIZORAL) 2 % cream 2 (two) times a day Apply sparingly to affected area(s)    [DISCONTINUED] atorvastatin (LIPITOR) 40 mg tablet Take 1 tablet (40 mg total) by mouth daily    [DISCONTINUED] buPROPion (WELLBUTRIN XL) 300 mg 24 hr tablet Take 1 tablet (300 mg total) by mouth daily    [DISCONTINUED] famotidine (PEPCID) 40 MG tablet Take 1 tablet (40 mg total) by mouth daily at bedtime as needed for indigestion or heartburn (only takes prn )    [DISCONTINUED] levothyroxine 88 mcg tablet Take 1 tablet (88 mcg total) by mouth daily    [DISCONTINUED] lisinopril (ZESTRIL) 10 mg tablet Take 1 tablet (10 mg total) by mouth daily in the early morning     No current facility-administered medications on file prior to visit  She is allergic to morphine and related       Review of Systems   Constitutional: Negative for chills and fatigue  HENT: Positive for congestion  Respiratory: Positive for cough  Negative for shortness of breath  Cardiovascular: Negative for chest pain and leg swelling  Gastrointestinal: Negative for abdominal pain  Denies any reflux symptoms   Musculoskeletal: Positive for arthralgias          As stated in HPI         Objective:      /80 (BP Location: Right arm, Patient Position: Sitting, Cuff Size: Standard)   Pulse (!) 110   Temp (!) 96 °F (35 6 °C) (Tympanic)   Resp 18   Ht 5' 3" (1 6 m)   Wt 75 8 kg (167 lb)   LMP 02/21/2010 Comment: post menapausal - D/T chemo  SpO2 96%   BMI 29 58 kg/m²          Physical Exam   Constitutional: She appears well-developed and well-nourished  No distress  Patient has lost 5 lb over the last 2 and half months  HENT:   Head: Normocephalic and atraumatic  Right Ear: External ear normal    Left Ear: External ear normal    Mouth/Throat: Oropharynx is clear and moist    Neck: Neck supple  Cardiovascular: Normal rate, regular rhythm and normal heart sounds  No murmur heard  Pulmonary/Chest: Effort normal and breath sounds normal  No respiratory distress  She has no wheezes  She has no rales  Abdominal: Soft  Bowel sounds are normal  There is no tenderness  Musculoskeletal:   Bilateral thumbs:  No significant swelling, full range of motion, no specific tenderness   Lymphadenopathy:     She has no cervical adenopathy  Neurological: She is alert  Skin: Skin is warm  Psychiatric: She has a normal mood and affect

## 2018-11-05 ENCOUNTER — OFFICE VISIT (OUTPATIENT)
Dept: FAMILY MEDICINE CLINIC | Facility: CLINIC | Age: 58
End: 2018-11-05
Payer: COMMERCIAL

## 2018-11-05 VITALS
TEMPERATURE: 97.6 F | SYSTOLIC BLOOD PRESSURE: 130 MMHG | WEIGHT: 168 LBS | HEART RATE: 96 BPM | RESPIRATION RATE: 18 BRPM | DIASTOLIC BLOOD PRESSURE: 80 MMHG | BODY MASS INDEX: 29.77 KG/M2 | HEIGHT: 63 IN

## 2018-11-05 DIAGNOSIS — B35.3 TINEA PEDIS OF BOTH FEET: ICD-10-CM

## 2018-11-05 DIAGNOSIS — S93.492D SPRAIN OF ANTERIOR TALOFIBULAR LIGAMENT OF LEFT ANKLE, SUBSEQUENT ENCOUNTER: Primary | ICD-10-CM

## 2018-11-05 PROCEDURE — 1036F TOBACCO NON-USER: CPT | Performed by: STUDENT IN AN ORGANIZED HEALTH CARE EDUCATION/TRAINING PROGRAM

## 2018-11-05 PROCEDURE — 99212 OFFICE O/P EST SF 10 MIN: CPT | Performed by: STUDENT IN AN ORGANIZED HEALTH CARE EDUCATION/TRAINING PROGRAM

## 2018-11-05 NOTE — PROGRESS NOTES
Podiatry Clinic Visit  Edel Jacobsen 62 y o  female MRN: [id number]  Encounter: [id number]    Assessment/Plan     Assessment:  1  Left lateral ankle sprain   2  Tinea Pedis b/l foot    Plan:  - Patient was seen/examined  All questions and concerns addressed  - Patient finished PT course, doing well with the pain and ankle ROM, advised to continue home exercises, RICE - rest, ice, compression and elevate  - Patient apply healthy feet OTC cream for tinea pedis which helps  - RTC in prn months      History of Present Illness     HPI:  Edel Jacobsen is a 62 y o  female who presents to follow up left ankle sprain  Patient states she is doing better now and has finished PT course  Patient states she exercises at home and rest and elevate  Patient also presents with tinea pedis for which she uses OTC healthy feet lotion which she states is helping clearing the fungus  Patient denies any other complaints of foot and ankle related  The patient denies any nausea, vomiting, fever, chills, shortness of breath, or chest pains  Review of Systems   Constitutional: Negative  HENT: Negative  Eyes: Negative  Respiratory: Negative  Cardiovascular: Negative  Gastrointestinal: Negative  Musculoskeletal: Negative   Skin: Tinea pedis   Neurological: Negative  Historical Information   Past Medical History:   Diagnosis Date    Anxiety     Breast cancer ([address] )     Cedric  breast cancer; lympedema in (L) arm,R nipple reconstruction today 4/15/2016    Colon polyps     Depression     Disease of thyroid gland     hypothyroidism   Diverticulosis of colon     Encounter for screening colonoscopy     Gastritis     Hemorrhoids     High cholesterol     Hypertension     Hypothyroidism     Lymphedema     L arm post radiation    S/P radiation therapy     2010 - 2012 to (L) breast along with chemo      Thyroid cancer ([address] )     Wears glasses      Past Surgical History:   Procedure Laterality Date    BREAST BIOPSY      percutan needle core use imag guide (Stereotactic)    BREAST LUMPECTOMY Left     BREAST SURGERY Bilateral     COLONOSCOPY      INCISIONAL BREAST BIOPSY      MASTECTOMY, RADICAL Right     AR COLONOSCOPY FLX DX W/COLLJ SPEC WHEN PFRMD N/A 12/13/2016    Procedure: COLONOSCOPY;  Surgeon: Kelly Beckett MD;  Location: [facility];   Service: General    AR DELAY BREAST PROS AFTER BREAST SURG Right 1/22/2016    Procedure: EXCHANGE RIGHT BREAST  IMPLANT/EXPANDER ;  Surgeon: Adam Garcia MD;  Location: [facility];  Service: Plastics    AR NIPPLE/AREOLA RECONSTRUCTION Right 4/15/2016    Procedure: H[place]amn Campi;  Surgeon: Adam Garcia MD;  Location: [facility];  Service: Plastics    AR REMOVAL OF BREAST CAPSULE Right 1/22/2016    Procedure: CAPSULECTOMY;  Surgeon: Adam Garcia MD;  [facility]: [facility];  Service: Plastics    THYROIDECTOMY      CA    TUBAL LIGATION       Social History   History   Alcohol Use    Yes     Comment: Socially     History   Drug Use No     History   Smoking Status    Former Smoker    Packs/day: 0 50    Quit date: 1/22/2010   Smokeless Tobacco    Former User     Family History:   Family History   Problem Relation Age of Onset    Diabetes Mother     Other Mother         Forgetfulness    No Known Problems Father     Cervical cancer Sister     Liver disease Brother     Cancer Family        Meds/Allergies     (Not in a hospital admission)  Allergies   Allergen Reactions    Morphine And Related Itching       Objective     Current Vitals:   Blood Pressure: 130/80 (11/05/18 1032)  Pulse: 96 (11/05/18 1032)  Temperature: 97 6 °F (36 4 °C) (11/05/18 1032)  Temp Source: Tympanic (11/05/18 1032)  Respirations: 18 (11/05/18 1032)  Height: 5' 3" (160 cm) (11/05/18 1032)  Weight - Scale: 76 2 kg (168 lb) (11/05/18 1032)        /80   Pulse 96   Temp 97 6 °F (36 4 °C) (Tympanic)   Resp 18   Ht 5' 3" (1 6 m)   Wt 76 2 kg (168 lb)   LMP 02/21/2010 Comment: post menapausal - D/T chemo  BMI 29 76 kg/m²       Lower Extremity Exam:    Musculoskeletal:  MMT is 5/5 to all compartments of the LE, +0/4 edema B/L, Digital ROM is intact, No pain up on palpation to the left ankle  Left Ankle ROM within normal limits  Pulses:   R DP is +2/4, R PT is +2/4, L DP is +2/4, L PT is +2/4, CFT< 3sec to all digits  Pedal hair is Present     Skin:  Mild tenia pedis infection noted on the plantar b/l foot  No open Lesions  Skin of the LE is normal texture, turgor  Neurologic:  Gross sensation is intact   Protective sensation is Intact

## 2019-01-01 ENCOUNTER — OFFICE VISIT (OUTPATIENT)
Dept: FAMILY MEDICINE CLINIC | Facility: CLINIC | Age: 59
End: 2019-01-01

## 2019-01-01 ENCOUNTER — IMMUNIZATIONS (OUTPATIENT)
Dept: FAMILY MEDICINE CLINIC | Facility: CLINIC | Age: 59
End: 2019-01-01

## 2019-01-01 ENCOUNTER — TELEPHONE (OUTPATIENT)
Dept: FAMILY MEDICINE CLINIC | Facility: CLINIC | Age: 59
End: 2019-01-01

## 2019-01-01 ENCOUNTER — OFFICE VISIT (OUTPATIENT)
Dept: HEMATOLOGY ONCOLOGY | Facility: CLINIC | Age: 59
End: 2019-01-01
Payer: COMMERCIAL

## 2019-01-01 VITALS
DIASTOLIC BLOOD PRESSURE: 80 MMHG | OXYGEN SATURATION: 99 % | HEART RATE: 57 BPM | RESPIRATION RATE: 18 BRPM | HEIGHT: 63 IN | SYSTOLIC BLOOD PRESSURE: 122 MMHG | TEMPERATURE: 96.4 F | BODY MASS INDEX: 30.3 KG/M2 | WEIGHT: 171 LBS

## 2019-01-01 VITALS
OXYGEN SATURATION: 97 % | HEIGHT: 63 IN | RESPIRATION RATE: 18 BRPM | TEMPERATURE: 97.8 F | SYSTOLIC BLOOD PRESSURE: 132 MMHG | BODY MASS INDEX: 30.65 KG/M2 | DIASTOLIC BLOOD PRESSURE: 80 MMHG | HEART RATE: 100 BPM | WEIGHT: 173 LBS

## 2019-01-01 VITALS
HEIGHT: 63 IN | BODY MASS INDEX: 29.95 KG/M2 | OXYGEN SATURATION: 97 % | RESPIRATION RATE: 18 BRPM | SYSTOLIC BLOOD PRESSURE: 130 MMHG | DIASTOLIC BLOOD PRESSURE: 84 MMHG | HEART RATE: 90 BPM | WEIGHT: 169 LBS | TEMPERATURE: 97.5 F

## 2019-01-01 DIAGNOSIS — C50.911 BILATERAL MALIGNANT NEOPLASM OF BREAST IN FEMALE, ESTROGEN RECEPTOR POSITIVE, UNSPECIFIED SITE OF BREAST (HCC): ICD-10-CM

## 2019-01-01 DIAGNOSIS — Z17.0 BILATERAL MALIGNANT NEOPLASM OF BREAST IN FEMALE, ESTROGEN RECEPTOR POSITIVE, UNSPECIFIED SITE OF BREAST (HCC): ICD-10-CM

## 2019-01-01 DIAGNOSIS — I10 BENIGN ESSENTIAL HYPERTENSION: ICD-10-CM

## 2019-01-01 DIAGNOSIS — E11.9 CONTROLLED TYPE 2 DIABETES MELLITUS WITHOUT COMPLICATION, WITHOUT LONG-TERM CURRENT USE OF INSULIN (HCC): ICD-10-CM

## 2019-01-01 DIAGNOSIS — C50.912 BILATERAL MALIGNANT NEOPLASM OF BREAST IN FEMALE, ESTROGEN RECEPTOR POSITIVE, UNSPECIFIED SITE OF BREAST (HCC): ICD-10-CM

## 2019-01-01 DIAGNOSIS — Z00.00 MEDICARE ANNUAL WELLNESS VISIT, SUBSEQUENT: ICD-10-CM

## 2019-01-01 DIAGNOSIS — E03.9 HYPOTHYROIDISM, UNSPECIFIED TYPE: ICD-10-CM

## 2019-01-01 DIAGNOSIS — E11.9 CONTROLLED TYPE 2 DIABETES MELLITUS WITHOUT COMPLICATION, WITHOUT LONG-TERM CURRENT USE OF INSULIN (HCC): Primary | ICD-10-CM

## 2019-01-01 DIAGNOSIS — E78.5 HYPERLIPIDEMIA, UNSPECIFIED HYPERLIPIDEMIA TYPE: ICD-10-CM

## 2019-01-01 DIAGNOSIS — S02.5XXA CLOSED FRACTURE OF TOOTH, INITIAL ENCOUNTER: ICD-10-CM

## 2019-01-01 DIAGNOSIS — Z23 ENCOUNTER FOR IMMUNIZATION: ICD-10-CM

## 2019-01-01 DIAGNOSIS — E66.09 CLASS 1 OBESITY DUE TO EXCESS CALORIES WITH SERIOUS COMORBIDITY AND BODY MASS INDEX (BMI) OF 30.0 TO 30.9 IN ADULT: ICD-10-CM

## 2019-01-01 DIAGNOSIS — Z17.1 MALIGNANT NEOPLASM OF LEFT BREAST IN FEMALE, ESTROGEN RECEPTOR NEGATIVE, UNSPECIFIED SITE OF BREAST (HCC): ICD-10-CM

## 2019-01-01 DIAGNOSIS — F41.9 ANXIETY: ICD-10-CM

## 2019-01-01 DIAGNOSIS — C50.912 MALIGNANT NEOPLASM OF LEFT BREAST IN FEMALE, ESTROGEN RECEPTOR NEGATIVE, UNSPECIFIED SITE OF BREAST (HCC): ICD-10-CM

## 2019-01-01 DIAGNOSIS — F32.A DEPRESSION, UNSPECIFIED DEPRESSION TYPE: ICD-10-CM

## 2019-01-01 DIAGNOSIS — C50.912 MALIGNANT NEOPLASM OF LEFT FEMALE BREAST, UNSPECIFIED ESTROGEN RECEPTOR STATUS, UNSPECIFIED SITE OF BREAST (HCC): Primary | ICD-10-CM

## 2019-01-01 LAB
CREAT UR-MCNC: 35.3 MG/DL
MICROALBUMIN UR-MCNC: 16.7 MG/L (ref 0–20)
MICROALBUMIN/CREAT 24H UR: 47 MG/G CREATININE (ref 0–30)
SL AMB POCT HEMOGLOBIN AIC: 5.9 (ref ?–6.5)
SL AMB POCT HEMOGLOBIN AIC: 6.1 (ref ?–6.5)

## 2019-01-01 PROCEDURE — 90682 RIV4 VACC RECOMBINANT DNA IM: CPT | Performed by: FAMILY MEDICINE

## 2019-01-01 PROCEDURE — 83036 HEMOGLOBIN GLYCOSYLATED A1C: CPT | Performed by: PHYSICIAN ASSISTANT

## 2019-01-01 PROCEDURE — 82043 UR ALBUMIN QUANTITATIVE: CPT | Performed by: PHYSICIAN ASSISTANT

## 2019-01-01 PROCEDURE — 3060F POS MICROALBUMINURIA REV: CPT | Performed by: PHYSICIAN ASSISTANT

## 2019-01-01 PROCEDURE — 3008F BODY MASS INDEX DOCD: CPT | Performed by: PHYSICIAN ASSISTANT

## 2019-01-01 PROCEDURE — 99213 OFFICE O/P EST LOW 20 MIN: CPT | Performed by: PHYSICIAN ASSISTANT

## 2019-01-01 PROCEDURE — 82570 ASSAY OF URINE CREATININE: CPT | Performed by: PHYSICIAN ASSISTANT

## 2019-01-01 PROCEDURE — 4010F ACE/ARB THERAPY RXD/TAKEN: CPT | Performed by: PHYSICIAN ASSISTANT

## 2019-01-01 PROCEDURE — G0439 PPPS, SUBSEQ VISIT: HCPCS | Performed by: PHYSICIAN ASSISTANT

## 2019-01-01 PROCEDURE — 3044F HG A1C LEVEL LT 7.0%: CPT | Performed by: PHYSICIAN ASSISTANT

## 2019-01-01 PROCEDURE — 3075F SYST BP GE 130 - 139MM HG: CPT | Performed by: PHYSICIAN ASSISTANT

## 2019-01-01 PROCEDURE — 1036F TOBACCO NON-USER: CPT | Performed by: PHYSICIAN ASSISTANT

## 2019-01-01 PROCEDURE — 3079F DIAST BP 80-89 MM HG: CPT | Performed by: PHYSICIAN ASSISTANT

## 2019-01-01 PROCEDURE — 90471 IMMUNIZATION ADMIN: CPT | Performed by: FAMILY MEDICINE

## 2019-01-01 PROCEDURE — 99214 OFFICE O/P EST MOD 30 MIN: CPT | Performed by: INTERNAL MEDICINE

## 2019-01-01 RX ORDER — SITAGLIPTIN 100 MG/1
TABLET, FILM COATED ORAL
Qty: 30 TABLET | Refills: 5 | Status: SHIPPED | OUTPATIENT
Start: 2019-01-01

## 2019-01-01 RX ORDER — SITAGLIPTIN 100 MG/1
TABLET, FILM COATED ORAL
Qty: 30 TABLET | Refills: 0 | Status: SHIPPED | OUTPATIENT
Start: 2019-01-01 | End: 2019-01-01

## 2019-01-01 RX ORDER — BUPROPION HYDROCHLORIDE 300 MG/1
300 TABLET ORAL DAILY
Qty: 90 TABLET | Refills: 0 | Status: SHIPPED | OUTPATIENT
Start: 2019-01-01 | End: 2020-01-01

## 2019-01-01 RX ORDER — SITAGLIPTIN 100 MG/1
TABLET, FILM COATED ORAL
Qty: 30 TABLET | Refills: 0 | OUTPATIENT
Start: 2019-01-01

## 2019-01-01 RX ORDER — LISINOPRIL 10 MG/1
10 TABLET ORAL
Qty: 90 TABLET | Refills: 1 | Status: SHIPPED | OUTPATIENT
Start: 2019-01-01 | End: 2020-01-01

## 2019-01-01 RX ORDER — ATORVASTATIN CALCIUM 40 MG/1
40 TABLET, FILM COATED ORAL DAILY
Qty: 90 TABLET | Refills: 0 | Status: SHIPPED | OUTPATIENT
Start: 2019-01-01 | End: 2019-01-01 | Stop reason: SDUPTHER

## 2019-01-01 RX ORDER — ATORVASTATIN CALCIUM 40 MG/1
40 TABLET, FILM COATED ORAL DAILY
Qty: 90 TABLET | Refills: 1 | Status: SHIPPED | OUTPATIENT
Start: 2019-01-01 | End: 2020-01-01

## 2019-01-01 RX ORDER — METFORMIN HYDROCHLORIDE EXTENDED-RELEASE TABLETS 500 MG/1
TABLET, FILM COATED, EXTENDED RELEASE ORAL
Qty: 180 TABLET | OUTPATIENT
Start: 2019-01-01

## 2019-01-01 RX ORDER — SITAGLIPTIN 100 MG/1
TABLET, FILM COATED ORAL
Qty: 30 TABLET | Refills: 0 | Status: SHIPPED | OUTPATIENT
Start: 2019-01-01 | End: 2019-01-01 | Stop reason: SDUPTHER

## 2019-01-01 RX ORDER — LEVOTHYROXINE SODIUM 88 UG/1
88 TABLET ORAL DAILY
Qty: 90 TABLET | Refills: 1 | Status: SHIPPED | OUTPATIENT
Start: 2019-01-01 | End: 2020-01-01 | Stop reason: SDUPTHER

## 2019-01-01 RX ORDER — ANASTROZOLE 1 MG/1
1 TABLET ORAL DAILY
Qty: 90 TABLET | Refills: 1 | Status: SHIPPED | OUTPATIENT
Start: 2019-01-01 | End: 2020-01-01

## 2019-01-01 RX ORDER — LISINOPRIL 10 MG/1
10 TABLET ORAL
Qty: 90 TABLET | Refills: 0 | Status: SHIPPED | OUTPATIENT
Start: 2019-01-01 | End: 2019-01-01 | Stop reason: SDUPTHER

## 2019-02-21 ENCOUNTER — TELEPHONE (OUTPATIENT)
Dept: FAMILY MEDICINE CLINIC | Facility: CLINIC | Age: 59
End: 2019-02-21

## 2019-02-21 NOTE — TELEPHONE ENCOUNTER
----- Message from Nata Cantu PA-C sent at 2/21/2019 12:58 PM EST -----  Regarding: Labs for appointment  Please call the patient and remind her to have her blood work done over the next several days in preparation for her appointment on Tuesday at 10:00 a m     The lab orders are in the system from October

## 2019-02-22 ENCOUNTER — APPOINTMENT (OUTPATIENT)
Dept: LAB | Facility: HOSPITAL | Age: 59
End: 2019-02-22
Payer: COMMERCIAL

## 2019-02-22 DIAGNOSIS — I10 BENIGN ESSENTIAL HYPERTENSION: ICD-10-CM

## 2019-02-22 DIAGNOSIS — E03.9 HYPOTHYROIDISM, UNSPECIFIED TYPE: ICD-10-CM

## 2019-02-22 DIAGNOSIS — E78.5 HYPERLIPIDEMIA, UNSPECIFIED HYPERLIPIDEMIA TYPE: ICD-10-CM

## 2019-02-22 DIAGNOSIS — R05.9 COUGH: ICD-10-CM

## 2019-02-22 DIAGNOSIS — R73.9 HYPERGLYCEMIA: ICD-10-CM

## 2019-02-22 LAB
ALBUMIN SERPL BCP-MCNC: 3.6 G/DL (ref 3.5–5)
ALP SERPL-CCNC: 53 U/L (ref 46–116)
ALT SERPL W P-5'-P-CCNC: 26 U/L (ref 12–78)
ANION GAP SERPL CALCULATED.3IONS-SCNC: 11 MMOL/L (ref 4–13)
AST SERPL W P-5'-P-CCNC: 18 U/L (ref 5–45)
BASOPHILS # BLD AUTO: 0.05 THOUSANDS/ΜL (ref 0–0.1)
BASOPHILS NFR BLD AUTO: 1 % (ref 0–1)
BILIRUB SERPL-MCNC: 0.24 MG/DL (ref 0.2–1)
BUN SERPL-MCNC: 18 MG/DL (ref 5–25)
CALCIUM SERPL-MCNC: 9.4 MG/DL (ref 8.3–10.1)
CHLORIDE SERPL-SCNC: 106 MMOL/L (ref 100–108)
CHOLEST SERPL-MCNC: 168 MG/DL (ref 50–200)
CO2 SERPL-SCNC: 28 MMOL/L (ref 21–32)
CREAT SERPL-MCNC: 1 MG/DL (ref 0.6–1.3)
EOSINOPHIL # BLD AUTO: 0.1 THOUSAND/ΜL (ref 0–0.61)
EOSINOPHIL NFR BLD AUTO: 2 % (ref 0–6)
ERYTHROCYTE [DISTWIDTH] IN BLOOD BY AUTOMATED COUNT: 13.4 % (ref 11.6–15.1)
EST. AVERAGE GLUCOSE BLD GHB EST-MCNC: 148 MG/DL
GFR SERPL CREATININE-BSD FRML MDRD: 62 ML/MIN/1.73SQ M
GLUCOSE P FAST SERPL-MCNC: 112 MG/DL (ref 65–99)
HBA1C MFR BLD: 6.8 % (ref 4.2–6.3)
HCT VFR BLD AUTO: 39.1 % (ref 34.8–46.1)
HDLC SERPL-MCNC: 77 MG/DL (ref 40–60)
HGB BLD-MCNC: 12.2 G/DL (ref 11.5–15.4)
IMM GRANULOCYTES # BLD AUTO: 0.02 THOUSAND/UL (ref 0–0.2)
IMM GRANULOCYTES NFR BLD AUTO: 0 % (ref 0–2)
LDLC SERPL CALC-MCNC: 78 MG/DL (ref 0–100)
LYMPHOCYTES # BLD AUTO: 2.2 THOUSANDS/ΜL (ref 0.6–4.47)
LYMPHOCYTES NFR BLD AUTO: 38 % (ref 14–44)
MCH RBC QN AUTO: 28.2 PG (ref 26.8–34.3)
MCHC RBC AUTO-ENTMCNC: 31.2 G/DL (ref 31.4–37.4)
MCV RBC AUTO: 90 FL (ref 82–98)
MONOCYTES # BLD AUTO: 0.49 THOUSAND/ΜL (ref 0.17–1.22)
MONOCYTES NFR BLD AUTO: 8 % (ref 4–12)
NEUTROPHILS # BLD AUTO: 2.97 THOUSANDS/ΜL (ref 1.85–7.62)
NEUTS SEG NFR BLD AUTO: 51 % (ref 43–75)
NONHDLC SERPL-MCNC: 91 MG/DL
NRBC BLD AUTO-RTO: 0 /100 WBCS
PLATELET # BLD AUTO: 355 THOUSANDS/UL (ref 149–390)
PMV BLD AUTO: 9.7 FL (ref 8.9–12.7)
POTASSIUM SERPL-SCNC: 3.7 MMOL/L (ref 3.5–5.3)
PROT SERPL-MCNC: 7.8 G/DL (ref 6.4–8.2)
RBC # BLD AUTO: 4.33 MILLION/UL (ref 3.81–5.12)
SODIUM SERPL-SCNC: 145 MMOL/L (ref 136–145)
TRIGL SERPL-MCNC: 67 MG/DL
TSH SERPL DL<=0.05 MIU/L-ACNC: 1.94 UIU/ML (ref 0.36–3.74)
WBC # BLD AUTO: 5.83 THOUSAND/UL (ref 4.31–10.16)

## 2019-02-22 PROCEDURE — 83036 HEMOGLOBIN GLYCOSYLATED A1C: CPT | Performed by: PHYSICIAN ASSISTANT

## 2019-02-22 PROCEDURE — 36415 COLL VENOUS BLD VENIPUNCTURE: CPT | Performed by: PHYSICIAN ASSISTANT

## 2019-02-22 PROCEDURE — 80053 COMPREHEN METABOLIC PANEL: CPT | Performed by: PHYSICIAN ASSISTANT

## 2019-02-22 PROCEDURE — 85025 COMPLETE CBC W/AUTO DIFF WBC: CPT | Performed by: PHYSICIAN ASSISTANT

## 2019-02-22 PROCEDURE — 84443 ASSAY THYROID STIM HORMONE: CPT

## 2019-02-22 PROCEDURE — 80061 LIPID PANEL: CPT | Performed by: PHYSICIAN ASSISTANT

## 2019-02-25 ENCOUNTER — OFFICE VISIT (OUTPATIENT)
Dept: HEMATOLOGY ONCOLOGY | Facility: CLINIC | Age: 59
End: 2019-02-25
Payer: COMMERCIAL

## 2019-02-25 VITALS
HEART RATE: 72 BPM | TEMPERATURE: 98 F | OXYGEN SATURATION: 97 % | DIASTOLIC BLOOD PRESSURE: 60 MMHG | RESPIRATION RATE: 18 BRPM | SYSTOLIC BLOOD PRESSURE: 116 MMHG | BODY MASS INDEX: 30.51 KG/M2 | WEIGHT: 172.2 LBS | HEIGHT: 63 IN

## 2019-02-25 DIAGNOSIS — C50.912 BILATERAL MALIGNANT NEOPLASM OF BREAST IN FEMALE, ESTROGEN RECEPTOR POSITIVE, UNSPECIFIED SITE OF BREAST (HCC): ICD-10-CM

## 2019-02-25 DIAGNOSIS — C50.911 BILATERAL MALIGNANT NEOPLASM OF BREAST IN FEMALE, ESTROGEN RECEPTOR POSITIVE, UNSPECIFIED SITE OF BREAST (HCC): ICD-10-CM

## 2019-02-25 DIAGNOSIS — C50.912 MALIGNANT NEOPLASM OF LEFT BREAST IN FEMALE, ESTROGEN RECEPTOR POSITIVE, UNSPECIFIED SITE OF BREAST (HCC): Primary | ICD-10-CM

## 2019-02-25 DIAGNOSIS — Z17.0 MALIGNANT NEOPLASM OF LEFT BREAST IN FEMALE, ESTROGEN RECEPTOR POSITIVE, UNSPECIFIED SITE OF BREAST (HCC): Primary | ICD-10-CM

## 2019-02-25 DIAGNOSIS — Z17.0 BILATERAL MALIGNANT NEOPLASM OF BREAST IN FEMALE, ESTROGEN RECEPTOR POSITIVE, UNSPECIFIED SITE OF BREAST (HCC): ICD-10-CM

## 2019-02-25 PROCEDURE — 99214 OFFICE O/P EST MOD 30 MIN: CPT | Performed by: INTERNAL MEDICINE

## 2019-02-25 RX ORDER — ANASTROZOLE 1 MG/1
1 TABLET ORAL DAILY
Qty: 90 TABLET | Refills: 1 | Status: SHIPPED | OUTPATIENT
Start: 2019-02-25 | End: 2019-05-09 | Stop reason: SDUPTHER

## 2019-02-25 NOTE — PROGRESS NOTES
Hematology / Oncology Outpatient Follow Up Note    Tabatha Miranda 62 y o  female SKS:3/86/1968 [facility]:[id number]         Date:  2/25/2019    Assessment / Plan:  A 62year old postmenopausal with history of locally advanced left breast cancer, grade 3, triple-negative disease  She had complete pathological response to the neoadjuvant chemotherapy followed by lumpectomy and lymph node dissection resulting in the RADHA in 2010  She was found to be negative for BRCA gene mutation  In September 2015, she was diagnosed with stage IIB right breast cancer with invasive lobular histology, grade 1, ER/NE positive HER-2 negative disease  She underwent mastectomy with lymph node dissection resulting in RADHA  She had immediate reconstruction  Her tumor had Oncotype DX recurrence score of 21  Therefore, adjuvant chemotherapy was not strongly indicated  She has been on adjuvant hormonal therapy with anastrozole with minimal side effects  She has no evidence recurrent disease, based on her symptomatology and physical examinations  I recommended her to continue with anastrozole 1 mg once a day  She is in agreement with my recommendation  I am going to set up left diagnostic mammography in May 2019  I will see her again in 6 months for routine follow-up         Subjective:      HPI:             Interval History:  A 62year old postmenopausal woman with locally advanced left breast cancer with triple-negative disease diagnosed in November 2010  She underwent neoadjuvant chemotherapy with a c  followed by paclitaxel resulted in complete pathological response  She underwent lumpectomy with axillary lymph node dissection followed by adjuvant radiation treatment  She underwent genetic testing which was negative for BRCA gene mutation  She has no evidence of recurrence of triple-negative breast cancer, to date  She was diagnosed with stage IIB right breast cancer, grade 1, ER/NE positive HER-2 negative disease in 2015   This was invasive lobular carcinoma  Her tumor had Oncotype DX score of 21  Since October 2015, she has been on adjuvant hormonal therapy with anastrozole  She came in today for follow-up  She has no new complaint  She has stable left upper extremity lymphedema  She denied bone pain  She has no respiratory symptoms  She has very minimal hot flashes  Her weight is stable  She is up to date for colonoscopy  Her last mammography on the left was in May 2018  Her performance status is normal                                     Objective:      Primary Diagnosis:     1  Locally advanced left breast cancer  Clinical T3, pathological at least N1, M0 disease with ER/NM-negative, HER2-negative disease  Diagnosed in November 2010    2  Negative for BRCA gene mutation  3  Right breast cancer, stage IIB(pT2, pN1a, M0)  Grade 1  Invasive lobular histology  ER/NM positive, HER-2 negative disease  Oncotype DX recurrence score of 21  Diagnosed in September 2015       Cancer Staging:  No matching staging information was found for the patient         Previous Hematologic/ Oncologic Treatment:      1  Neoadjuvant chemotherapy with dose-dense AC x4 followed by weekly paclitaxel x12    2  Whole-breast radiation therapy completed in September of 2011       Current Hematologic/ Oncologic Treatment:       Adjuvant hormonal therapy with anastrozole since October 2015       Disease Status:      1  Complete pathological response to neoadjuvant chemotherapy  2  RADHA status post left lumpectomy with axillary lymph node dissection  3  RADHA, status post right mastectomy with sentinel lymph node biopsy with reconstruction in September 2015       Test Results:     Pathology:     Right mastectomy specimen showed 2 cm of invasive lobular carcinoma, grade 1, ER/NM positive, HER-2 negative disease  One sentinel lymph node was positive for metastatic disease  Stage IIB(pT2, pN1a, M0)   Oncotype DX recurrence score 21       Radiology:     Mammography in the left breast in May 2018 was benign  BI-RADS 2       Laboratory:     See below     Physical Exam:        General Appearance:    Alert, oriented          Eyes:    PERRL   Ears:    Normal external ear canals, both ears   Nose:   Nares normal, septum midline   Throat:   Mucosa moist  Pharynx without injection  Neck:   Supple         Lungs:     Clear to auscultation bilaterally   Chest Wall:    No tenderness or deformity    Heart:    Regular rate and rhythm         Abdomen:     Soft, non-tender, bowel sounds +, no organomegaly               Extremities:   Extremities no cyanosis, left upper extremity lymphedema          Skin:   no rash or icterus  Lymph nodes:   Cervical, supraclavicular, and axillary nodes normal   Neurologic:   CNII-XII intact, normal strength, sensation and reflexes     Throughout             Breast exam:   Right breast is negative  Left breast status post lumpectomy with completely closed wound in left upper quadrant  No palpable mass or nodule in the left breast               ROS: Review of Systems   All other systems reviewed and are negative  Imaging: No results found  Labs:   Lab Results   Component Value Date    WBC 5 83 02/22/2019    HGB 12 2 02/22/2019    HCT 39 1 02/22/2019    MCV 90 02/22/2019     02/22/2019     Lab Results   Component Value Date     05/08/2015    K 3 7 02/22/2019     02/22/2019    CO2 28 02/22/2019    ANIONGAP 8 05/08/2015    BUN 18 02/22/2019    CREATININE 1 00 02/22/2019    GLUCOSE 94 05/08/2015    GLUF 112 (H) 02/22/2019    CALCIUM 9 4 02/22/2019    AST 18 02/22/2019    ALT 26 02/22/2019    ALKPHOS 53 02/22/2019    PROT 7 8 05/08/2015    BILITOT 0 3 05/08/2015    EGFR 62 02/22/2019           Current Medications: Reviewed  Allergies: Reviewed  PMH/FH/SH:  Reviewed      Vital Sign:    Body surface area is 1 82 meters squared      Wt Readings from Last 3 Encounters:   02/25/19 78 1 kg (172 lb 3 2 oz)   11/05/18 76 2 kg (168 lb)   10/22/18 75 8 kg (167 lb)        Temp Readings from Last 3 Encounters:   02/25/19 98 °F (36 7 °C) (Oral)   11/05/18 97 6 °F (36 4 °C) (Tympanic)   10/22/18 (!) 96 °F (35 6 °C) (Tympanic)        BP Readings from Last 3 Encounters:   02/25/19 116/60   11/05/18 130/80   10/22/18 126/80         Pulse Readings from Last 3 Encounters:   02/25/19 72   11/05/18 96   10/22/18 (!) 110     @LASTSAO2(3)@

## 2019-02-26 ENCOUNTER — APPOINTMENT (OUTPATIENT)
Dept: LAB | Facility: CLINIC | Age: 59
End: 2019-02-26
Payer: COMMERCIAL

## 2019-02-26 ENCOUNTER — OFFICE VISIT (OUTPATIENT)
Dept: FAMILY MEDICINE CLINIC | Facility: CLINIC | Age: 59
End: 2019-02-26

## 2019-02-26 ENCOUNTER — TRANSCRIBE ORDERS (OUTPATIENT)
Dept: LAB | Facility: CLINIC | Age: 59
End: 2019-02-26

## 2019-02-26 VITALS
RESPIRATION RATE: 18 BRPM | TEMPERATURE: 97.6 F | HEIGHT: 63 IN | WEIGHT: 173 LBS | HEART RATE: 93 BPM | SYSTOLIC BLOOD PRESSURE: 166 MMHG | BODY MASS INDEX: 30.65 KG/M2 | DIASTOLIC BLOOD PRESSURE: 94 MMHG

## 2019-02-26 DIAGNOSIS — E11.9 DIABETIC EYE EXAM (HCC): ICD-10-CM

## 2019-02-26 DIAGNOSIS — I10 BENIGN ESSENTIAL HYPERTENSION: ICD-10-CM

## 2019-02-26 DIAGNOSIS — R10.30 LOWER ABDOMINAL PAIN: ICD-10-CM

## 2019-02-26 DIAGNOSIS — Z11.59 ENCOUNTER FOR HEPATITIS C SCREENING TEST FOR LOW RISK PATIENT: ICD-10-CM

## 2019-02-26 DIAGNOSIS — H04.123 DRY EYE SYNDROME OF BOTH EYES: Primary | ICD-10-CM

## 2019-02-26 DIAGNOSIS — Z01.00 DIABETIC EYE EXAM (HCC): ICD-10-CM

## 2019-02-26 DIAGNOSIS — E78.5 HYPERLIPIDEMIA, UNSPECIFIED HYPERLIPIDEMIA TYPE: ICD-10-CM

## 2019-02-26 DIAGNOSIS — K29.70 GASTRITIS, PRESENCE OF BLEEDING UNSPECIFIED, UNSPECIFIED CHRONICITY, UNSPECIFIED GASTRITIS TYPE: ICD-10-CM

## 2019-02-26 DIAGNOSIS — E11.9 CONTROLLED TYPE 2 DIABETES MELLITUS WITHOUT COMPLICATION, WITHOUT LONG-TERM CURRENT USE OF INSULIN (HCC): Primary | ICD-10-CM

## 2019-02-26 LAB
LEFT EYE DIABETIC RETINOPATHY: ABNORMAL
LEFT EYE IMAGE QUALITY: ABNORMAL
LEFT EYE MACULAR EDEMA: ABNORMAL
LEFT EYE OTHER RETINOPATHY: ABNORMAL
RIGHT EYE DIABETIC RETINOPATHY: ABNORMAL
RIGHT EYE IMAGE QUALITY: ABNORMAL
RIGHT EYE MACULAR EDEMA: ABNORMAL
RIGHT EYE OTHER RETINOPATHY: ABNORMAL
SEVERITY (EYE EXAM): ABNORMAL

## 2019-02-26 PROCEDURE — 36415 COLL VENOUS BLD VENIPUNCTURE: CPT

## 2019-02-26 PROCEDURE — 86803 HEPATITIS C AB TEST: CPT

## 2019-02-26 PROCEDURE — 99214 OFFICE O/P EST MOD 30 MIN: CPT | Performed by: PHYSICIAN ASSISTANT

## 2019-02-26 RX ORDER — BLOOD-GLUCOSE METER
KIT MISCELLANEOUS
Qty: 1 EACH | Refills: 0 | Status: SHIPPED | OUTPATIENT
Start: 2019-02-26

## 2019-02-26 RX ORDER — FAMOTIDINE 40 MG/1
40 TABLET, FILM COATED ORAL
Qty: 30 TABLET | Refills: 1 | Status: SHIPPED | OUTPATIENT
Start: 2019-02-26 | End: 2020-01-01 | Stop reason: SDUPTHER

## 2019-02-26 RX ORDER — LANCETS 28 GAUGE
EACH MISCELLANEOUS
Qty: 100 EACH | Refills: 5 | Status: SHIPPED | OUTPATIENT
Start: 2019-02-26 | End: 2020-01-01

## 2019-02-26 NOTE — PATIENT INSTRUCTIONS
Recent hemoglobin A1c has increased from 6 1 up to 6 8  Patient advised is now a diabetic and medication treatment is recommended  Start metformin 500 mg twice daily  Advised of possible GI upset and diarrhea which usually resolves after 3 weeks  Recommend checking blood sugars at least twice a week  1 day fasting and another day 2 hours post largest meal   She has been given a blood sugar log book to record sugars  She has been given information about carb counting, portion sizes, symptoms of low and high blood sugar  Diabetic eye exam here today  Importance of doing this once yearly discussed  Complications of diabetes discussed including cardiac, stroke, blindness, kidney failure, nerve damage  Advised to check feet daily and follow-up with any open sores  Recommend following up here with Liu in about 6 weeks with a blood sugar log book at that time

## 2019-02-26 NOTE — PROGRESS NOTES
Assessment/Plan:    BMI Counseling: Body mass index is 30 65 kg/m²  Discussed the patient's BMI with her  The BMI is above average  BMI counseling and education was provided to the patient  Nutrition recommendations include reducing portion sizes, decreasing overall calorie intake and moderation in carbohydrate intake  Exercise recommendations include exercising 3-5 times per week  Patient Instructions   Recent hemoglobin A1c has increased from 6 1 up to 6 8  Patient advised is now a diabetic and medication treatment is recommended  Start metformin 500 mg twice daily  Advised of possible GI upset and diarrhea which usually resolves after 3 weeks  Recommend checking blood sugars at least twice a week  1 day fasting and another day 2 hours post largest meal   She has been given a blood sugar log book to record sugars  She has been given information about carb counting, portion sizes, symptoms of low and high blood sugar  Diabetic eye exam here today  Importance of doing this once yearly discussed  Complications of diabetes discussed including cardiac, stroke, blindness, kidney failure, nerve damage  Advised to check feet daily and follow-up with any open sores  Recommend following up here with Liu in about 6 weeks with a blood sugar log book at that time  M*Modal software was used to dictate this note  It may contain errors with dictating incorrect words/spelling  Please contact provider directly for any questions  Diagnoses and all orders for this visit:    Controlled type 2 diabetes mellitus without complication, without long-term current use of insulin (HCC)  -     metFORMIN (GLUCOPHAGE) 500 mg tablet; Take 1 tablet (500 mg total) by mouth 2 (two) times a day with meals    Benign essential hypertension    Hyperlipidemia, unspecified hyperlipidemia type    Encounter for hepatitis C screening test for low risk patient  -     Hepatitis C antibody;  Future    Gastritis, presence of bleeding unspecified, unspecified chronicity, unspecified gastritis type  -     famotidine (PEPCID) 40 MG tablet; Take 1 tablet (40 mg total) by mouth daily at bedtime as needed for indigestion or heartburn (only takes prn )    Lower abdominal pain          Subjective:      Patient ID: Re Palafox is a 62 y o  female  Patient presents today for follow-up of recent lab results  She also states that she continues with abdominal pain  She did run out of the Famotidine 40 mg daily  She denies any vomiting or diarrhea  She actually points to the lower abdominal area as to where her pain is currently  She states her bowels were normal this morning  She denies any blood in her stools  She is compliant with her medications otherwise  There is a family history of diabetes  The following portions of the patient's history were reviewed and updated as appropriate:   She  has a past medical history of Anxiety, Breast cancer ([facility] [address] ), Colon polyps, Controlled type 2 diabetes mellitus without complication, without long-term current use of insulin ([facility] [address] ) (2/26/2019), Depression, Disease of thyroid gland, Diverticulosis of colon, Encounter for screening colonoscopy, Gastritis, Hemorrhoids, High cholesterol, Hypertension, Hypothyroidism, Lymphedema, S/P radiation therapy, Thyroid cancer ([facility] [address] ), and Wears glasses    She   Patient Active Problem List    Diagnosis Date Noted    Controlled type 2 diabetes mellitus without complication, without long-term current use of insulin ([facility] [address] ) 02/26/2019    Encounter for hepatitis C screening test for low risk patient 02/26/2019    Lower abdominal pain 02/26/2019    Cough 10/22/2018    Left ankle pain 07/16/2018    Hyperglycemia 03/15/2018    Chronic low back pain 09/22/2017    Agoraphobia 09/22/2017    Anxiety 09/22/2017    Tinea pedis 05/10/2017    Arthralgia of multiple joints 05/10/2017    Obesity 10/07/2016    Hot flashes 10/07/2016    Gastritis 08/31/2015    Breast cancer ([facility] ) 03/30/2015    Hyperlipidemia 11/11/2014    Malignant neoplasm of left breast ([facility] ) 12/06/2013    Lymphedema 08/21/2013    Benign essential hypertension 06/26/2013    Hypothyroidism 10/06/2012    Depression 10/06/2012     She  has a past surgical history that includes Breast surgery (Bilateral); Colonoscopy; Thyroidectomy; pr colonoscopy flx dx w/collj spec when pfrmd (N/A, 12/13/2016); pr nipple/areola reconstruction (Right, 4/15/2016); pr delay breast pros after breast surg (Right, 1/22/2016); pr removal of breast capsule (Right, 1/22/2016); Breast biopsy; Incisional breast biopsy; Breast lumpectomy (Left); Mastectomy, radical (Right); and Tubal ligation  Her family history includes Cancer in her family; Cervical cancer in her sister; Diabetes in her mother; Liver disease in her brother; No Known Problems in her father; Other in her mother  She  reports that she quit smoking about 9 years ago  She smoked 0 50 packs per day  She has quit using smokeless tobacco  She reports that she drinks alcohol  She reports that she does not use drugs    Current Outpatient Medications   Medication Sig Dispense Refill    anastrozole (ARIMIDEX) 1 mg tablet Take 1 tablet (1 mg total) by mouth daily 90 tablet 1    atorvastatin (LIPITOR) 40 mg tablet Take 1 tablet (40 mg total) by mouth daily 90 tablet 0    buPROPion (WELLBUTRIN XL) 300 mg 24 hr tablet Take 1 tablet (300 mg total) by mouth daily 90 tablet 0    Calcium Citrate 1040 MG TABS Take 1 tablet by mouth daily      famotidine (PEPCID) 40 MG tablet Take 1 tablet (40 mg total) by mouth daily at bedtime as needed for indigestion or heartburn (only takes prn ) 30 tablet 1    ketoconazole (NIZORAL) 2 % cream 2 (two) times a day Apply sparingly to affected area(s)  3    levothyroxine 88 mcg tablet Take 1 tablet (88 mcg total) by mouth daily 90 tablet 0    lisinopril (ZESTRIL) 10 mg tablet Take 1 tablet (10 mg total) by mouth daily in the early morning 90 tablet 0  metFORMIN (GLUCOPHAGE) 500 mg tablet Take 1 tablet (500 mg total) by mouth 2 (two) times a day with meals 180 tablet 1     No current facility-administered medications for this visit  Current Outpatient Medications on File Prior to Visit   Medication Sig    anastrozole (ARIMIDEX) 1 mg tablet Take 1 tablet (1 mg total) by mouth daily    atorvastatin (LIPITOR) 40 mg tablet Take 1 tablet (40 mg total) by mouth daily    buPROPion (WELLBUTRIN XL) 300 mg 24 hr tablet Take 1 tablet (300 mg total) by mouth daily    Calcium Citrate 1040 MG TABS Take 1 tablet by mouth daily    ketoconazole (NIZORAL) 2 % cream 2 (two) times a day Apply sparingly to affected area(s)    levothyroxine 88 mcg tablet Take 1 tablet (88 mcg total) by mouth daily    lisinopril (ZESTRIL) 10 mg tablet Take 1 tablet (10 mg total) by mouth daily in the early morning    [DISCONTINUED] famotidine (PEPCID) 40 MG tablet Take 1 tablet (40 mg total) by mouth daily at bedtime as needed for indigestion or heartburn (only takes prn )     No current facility-administered medications on file prior to visit  She is allergic to morphine and related       Review of Systems   Gastrointestinal:        As stated in HPI   Skin:        She does have dryness of both of her feet  She states the podiatrist recommended a prescription cream that would not be covered by the insurance  She is utilizing an over-the-counter cream at this time  Objective:      /94   Pulse 93   Temp 97 6 °F (36 4 °C)   Resp 18   Ht 5' 3" (1 6 m)   Wt 78 5 kg (173 lb)   LMP 02/21/2010 Comment: post menapausal - D/T chemo  BMI 30 65 kg/m²          Physical Exam   Constitutional: She appears well-developed and well-nourished  No distress  HENT:   Head: Normocephalic and atraumatic     Right Ear: External ear normal    Left Ear: External ear normal    Nose: Nose normal    Mouth/Throat: Oropharynx is clear and moist    Eyes: Conjunctivae are normal    Neck: Normal range of motion  Neck supple  No thyromegaly present  Cardiovascular: Normal rate, regular rhythm and normal heart sounds  Exam reveals no gallop and no friction rub  Pulses are no weak pulses  No murmur heard  Pulses:       Dorsalis pedis pulses are 2+ on the right side, and 2+ on the left side  Posterior tibial pulses are 2+ on the right side, and 2+ on the left side  Pulmonary/Chest: Effort normal and breath sounds normal  No respiratory distress  She has no wheezes  She has no rales  Abdominal: Soft  Bowel sounds are normal  She exhibits no mass  There is tenderness (Diffuse tenderness of the lower quadrants  No tenderness of the upper quadrants at this time  )  Musculoskeletal: Normal range of motion  She exhibits no deformity  Feet:   Right Foot:   Skin Integrity: Positive for dry skin  Negative for ulcer, skin breakdown, erythema, warmth or callus  Left Foot:   Skin Integrity: Positive for dry skin  Negative for ulcer, skin breakdown, erythema, warmth or callus  Lymphadenopathy:     She has no cervical adenopathy  Neurological: She is alert  Skin: Skin is warm  Psychiatric: She has a normal mood and affect  Diabetic Foot Exam    Patient's shoes and socks removed  Right Foot/Ankle   Right Foot Inspection  Skin Exam: skin normal and dry skin skin not intact, no warmth, no callus, no erythema, no maceration, no abnormal color, no pre-ulcer, no ulcer and no callus                          Toe Exam: no swelling, no tenderness, erythema and  no right toe deformity  Sensory       Monofilament testing: intact  Vascular    The right DP pulse is 2+  The right PT pulse is 2+       Left Foot/Ankle  Left Foot Inspection  Skin Exam: skin normal and dry skinskin not intact, no warmth, no erythema, no maceration, normal color, no pre-ulcer, no ulcer and no callus                         Toe Exam: no swelling, no tenderness, no erythema and no left toe deformity Sensory       Monofilament: intact  Vascular    The left DP pulse is 2+  The left PT pulse is 2+  Assign Risk Category:  No deformity present; Loss of protective sensation;  No weak pulses       Risk: 0

## 2019-02-27 LAB — HCV AB SER QL: NORMAL

## 2019-03-04 ENCOUNTER — TELEPHONE (OUTPATIENT)
Dept: FAMILY MEDICINE CLINIC | Facility: CLINIC | Age: 59
End: 2019-03-04

## 2019-03-13 DIAGNOSIS — E11.9 CONTROLLED TYPE 2 DIABETES MELLITUS WITHOUT COMPLICATION, WITHOUT LONG-TERM CURRENT USE OF INSULIN (HCC): Primary | ICD-10-CM

## 2019-03-13 NOTE — TELEPHONE ENCOUNTER
Pt says she is still very sick from the Metformin once a day  She says her stomach is so upset and she can't sleep at night

## 2019-03-13 NOTE — TELEPHONE ENCOUNTER
She can stop metformin and I will call in [place] [facility] [place] for her to take instead   I recommend taking a day or 2 for her stomach to calm down before starting it

## 2019-04-02 DIAGNOSIS — E78.5 HYPERLIPIDEMIA, UNSPECIFIED HYPERLIPIDEMIA TYPE: ICD-10-CM

## 2019-04-02 DIAGNOSIS — I10 BENIGN ESSENTIAL HYPERTENSION: ICD-10-CM

## 2019-04-02 RX ORDER — ATORVASTATIN CALCIUM 40 MG/1
40 TABLET, FILM COATED ORAL DAILY
Qty: 90 TABLET | Refills: 0 | Status: SHIPPED | OUTPATIENT
Start: 2019-04-02 | End: 2019-04-30 | Stop reason: SDUPTHER

## 2019-04-02 RX ORDER — LISINOPRIL 10 MG/1
10 TABLET ORAL
Qty: 90 TABLET | Refills: 0 | Status: SHIPPED | OUTPATIENT
Start: 2019-04-02 | End: 2019-04-30 | Stop reason: SDUPTHER

## 2019-04-11 DIAGNOSIS — E11.9 CONTROLLED TYPE 2 DIABETES MELLITUS WITHOUT COMPLICATION, WITHOUT LONG-TERM CURRENT USE OF INSULIN (HCC): ICD-10-CM

## 2019-04-11 RX ORDER — SITAGLIPTIN 100 MG/1
TABLET, FILM COATED ORAL
Qty: 30 TABLET | Refills: 1 | Status: SHIPPED | OUTPATIENT
Start: 2019-04-11 | End: 2019-06-03 | Stop reason: SDUPTHER

## 2019-04-30 DIAGNOSIS — E78.5 HYPERLIPIDEMIA, UNSPECIFIED HYPERLIPIDEMIA TYPE: ICD-10-CM

## 2019-04-30 DIAGNOSIS — I10 BENIGN ESSENTIAL HYPERTENSION: ICD-10-CM

## 2019-04-30 DIAGNOSIS — E11.9 CONTROLLED TYPE 2 DIABETES MELLITUS WITHOUT COMPLICATION, WITHOUT LONG-TERM CURRENT USE OF INSULIN (HCC): Primary | ICD-10-CM

## 2019-05-01 RX ORDER — METFORMIN HYDROCHLORIDE 500 MG/1
TABLET, FILM COATED, EXTENDED RELEASE ORAL
Qty: 180 TABLET | Refills: 0 | Status: SHIPPED | OUTPATIENT
Start: 2019-05-01 | End: 2019-01-01

## 2019-05-01 RX ORDER — LISINOPRIL 10 MG/1
10 TABLET ORAL
Qty: 90 TABLET | Refills: 0 | Status: SHIPPED | OUTPATIENT
Start: 2019-05-01 | End: 2019-01-01 | Stop reason: SDUPTHER

## 2019-05-01 RX ORDER — ATORVASTATIN CALCIUM 40 MG/1
40 TABLET, FILM COATED ORAL DAILY
Qty: 90 TABLET | Refills: 0 | Status: SHIPPED | OUTPATIENT
Start: 2019-05-01 | End: 2019-01-01 | Stop reason: SDUPTHER

## 2019-05-09 DIAGNOSIS — Z17.0 BILATERAL MALIGNANT NEOPLASM OF BREAST IN FEMALE, ESTROGEN RECEPTOR POSITIVE, UNSPECIFIED SITE OF BREAST (HCC): ICD-10-CM

## 2019-05-09 DIAGNOSIS — C50.912 BILATERAL MALIGNANT NEOPLASM OF BREAST IN FEMALE, ESTROGEN RECEPTOR POSITIVE, UNSPECIFIED SITE OF BREAST (HCC): ICD-10-CM

## 2019-05-09 DIAGNOSIS — C50.911 BILATERAL MALIGNANT NEOPLASM OF BREAST IN FEMALE, ESTROGEN RECEPTOR POSITIVE, UNSPECIFIED SITE OF BREAST (HCC): ICD-10-CM

## 2019-05-09 RX ORDER — ANASTROZOLE 1 MG/1
1 TABLET ORAL DAILY
Qty: 90 TABLET | Refills: 1 | Status: SHIPPED | OUTPATIENT
Start: 2019-05-09 | End: 2019-01-01 | Stop reason: SDUPTHER

## 2019-06-03 ENCOUNTER — HOSPITAL ENCOUNTER (OUTPATIENT)
Dept: MAMMOGRAPHY | Facility: CLINIC | Age: 59
Discharge: HOME | End: 2019-06-03
Payer: COMMERCIAL

## 2019-06-03 ENCOUNTER — TELEPHONE (OUTPATIENT)
Dept: HEMATOLOGY ONCOLOGY | Facility: CLINIC | Age: 59
End: 2019-06-03

## 2019-06-03 VITALS — WEIGHT: 173 LBS | HEIGHT: 63 IN | BODY MASS INDEX: 30.65 KG/M2

## 2019-06-03 DIAGNOSIS — E11.9 CONTROLLED TYPE 2 DIABETES MELLITUS WITHOUT COMPLICATION, WITHOUT LONG-TERM CURRENT USE OF INSULIN (HCC): ICD-10-CM

## 2019-06-03 DIAGNOSIS — E03.9 HYPOTHYROIDISM, UNSPECIFIED TYPE: ICD-10-CM

## 2019-06-03 DIAGNOSIS — Z17.0 MALIGNANT NEOPLASM OF LEFT BREAST IN FEMALE, ESTROGEN RECEPTOR POSITIVE, UNSPECIFIED SITE OF BREAST (HCC): ICD-10-CM

## 2019-06-03 DIAGNOSIS — C50.912 MALIGNANT NEOPLASM OF LEFT BREAST IN FEMALE, ESTROGEN RECEPTOR POSITIVE, UNSPECIFIED SITE OF BREAST (HCC): ICD-10-CM

## 2019-06-03 PROCEDURE — 77065 DX MAMMO INCL CAD UNI: CPT

## 2019-06-03 PROCEDURE — G0279 TOMOSYNTHESIS, MAMMO: HCPCS

## 2019-06-03 RX ORDER — SITAGLIPTIN 100 MG/1
TABLET, FILM COATED ORAL
Qty: 30 TABLET | Refills: 0 | Status: SHIPPED | OUTPATIENT
Start: 2019-06-03 | End: 2019-01-01

## 2019-06-03 RX ORDER — LEVOTHYROXINE SODIUM 88 UG/1
88 TABLET ORAL DAILY
Qty: 90 TABLET | Refills: 0 | Status: SHIPPED | OUTPATIENT
Start: 2019-06-03 | End: 2019-01-01 | Stop reason: SDUPTHER

## 2019-08-22 NOTE — PROGRESS NOTES
Hematology / Oncology Outpatient Follow Up Note    Ti Doug 61 y o  female XUQ:[id number] SIN:[id number]         Date:  8/22/2019    Assessment / Plan:  A 63 year old postmenopausal with history of locally advanced left breast cancer, grade 3, triple-negative disease  She had complete pathological response to the neoadjuvant chemotherapy followed by lumpectomy and lymph node dissection resulting in the RADHA in 2010  She was found to be negative for BRCA gene mutation  In September 2015, she was diagnosed with stage IIB right breast cancer with invasive lobular histology, grade 1, ER/MN positive HER-2 negative disease  She underwent mastectomy with lymph node dissection resulting in RADHA  She had immediate reconstruction  Her tumor had Oncotype DX recurrence score of 21  Therefore, adjuvant chemotherapy was not strongly indicated  She has been on adjuvant hormonal therapy with anastrozole with minimal side effects  Clinically, she has no evidence recurrent disease  I recommended her to continue with anastrozole 1 mg once a day  I will see her again in a year at which time we will discuss 5 years versus 10 years of aromatase inhibitor  Agreement with my recommendations         Subjective:      HPI:             Interval History:  A 63 year old postmenopausal woman with locally advanced left breast cancer with triple-negative disease diagnosed in November 2010  She underwent neoadjuvant chemotherapy with a c  followed by paclitaxel resulted in complete pathological response  She underwent lumpectomy with axillary lymph node dissection followed by adjuvant radiation treatment  She underwent genetic testing which was negative for BRCA gene mutation  She has no evidence of recurrence of triple-negative breast cancer, to date  She was diagnosed with stage IIB right breast cancer, grade 1, ER/MN positive HER-2 negative disease in 2015  This was invasive lobular carcinoma  Her tumor had Oncotype DX score of 21   Since October 2015, she has been on adjuvant hormonal therapy with anastrozole  She came in today for follow-up  She has no new complaint  She has no complaint hot flashes or musculoskeletal symptoms  She has chronic left lymphedema  She denied pain  She has no respiratory symptoms  Weight is stable  Her mammography on the left in June 2018 was benign  She has normal performance status                                    Objective:      Primary Diagnosis:     1  Locally advanced left breast cancer  Clinical T3, pathological at least N1, M0 disease with ER/AL-negative, HER2-negative disease  Diagnosed in November 2010    2  Negative for BRCA gene mutation  3  Right breast cancer, stage IIB(pT2, pN1a, M0)  Grade 1  Invasive lobular histology  ER/AL positive, HER-2 negative disease  Oncotype DX recurrence score of 21  Diagnosed in September 2015       Cancer Staging:  No matching staging information was found for the patient         Previous Hematologic/ Oncologic Treatment:      1  Neoadjuvant chemotherapy with dose-dense AC x4 followed by weekly paclitaxel x12    2  Whole-breast radiation therapy completed in September of 2011       Current Hematologic/ Oncologic Treatment:       Adjuvant hormonal therapy with anastrozole since October 2015       Disease Status:      1  Complete pathological response to neoadjuvant chemotherapy  2  RADHA status post left lumpectomy with axillary lymph node dissection  3  RADHA, status post right mastectomy with sentinel lymph node biopsy with reconstruction in September 2015       Test Results:     Pathology:     Right mastectomy specimen showed 2 cm of invasive lobular carcinoma, grade 1, ER/AL positive, HER-2 negative disease  One sentinel lymph node was positive for metastatic disease  Stage IIB(pT2, pN1a, M0)  Oncotype DX recurrence score 21       Radiology:     Mammography in the left breast in June 2019 was benign   BI-RADS 2       Laboratory:     See below     Physical Exam:        General Appearance:    Alert, oriented          Eyes:    PERRL   Ears:    Normal external ear canals, both ears   Nose:   Nares normal, septum midline   Throat:   Mucosa moist  Pharynx without injection  Neck:   Supple         Lungs:     Clear to auscultation bilaterally   Chest Wall:    No tenderness or deformity    Heart:    Regular rate and rhythm         Abdomen:     Soft, non-tender, bowel sounds +, no organomegaly               Extremities:   Extremities no cyanosis, left upper extremity lymphedema          Skin:   no rash or icterus  Lymph nodes:   Cervical, supraclavicular, and axillary nodes normal   Neurologic:   CNII-XII intact, normal strength, sensation and reflexes     Throughout             Breast exam:   Right breast is negative  Left breast status post lumpectomy with completely closed wound in left upper quadrant  No palpable mass or nodule in the left breast             ROS: Review of Systems   All other systems reviewed and are negative  Imaging: No results found  Labs:   Lab Results   Component Value Date    WBC 5 83 02/22/2019    HGB 12 2 02/22/2019    HCT 39 1 02/22/2019    MCV 90 02/22/2019     02/22/2019     Lab Results   Component Value Date     05/08/2015    K 3 7 02/22/2019     02/22/2019    CO2 28 02/22/2019    ANIONGAP 8 05/08/2015    BUN 18 02/22/2019    CREATININE 1 00 02/22/2019    GLUCOSE 94 05/08/2015    GLUF 112 (H) 02/22/2019    CALCIUM 9 4 02/22/2019    AST 18 02/22/2019    ALT 26 02/22/2019    ALKPHOS 53 02/22/2019    PROT 7 8 05/08/2015    BILITOT 0 3 05/08/2015    EGFR 62 02/22/2019           Current Medications: Reviewed  Allergies: Reviewed  PMH/FH/SH:  Reviewed      Vital Sign:    Body surface area is 1 81 meters squared      Wt Readings from Last 3 Encounters:   08/22/19 77 6 kg (171 lb)   06/05/19 78 5 kg (173 lb)   06/03/19 78 5 kg (173 lb)        Temp Readings from Last 3 Encounters:   08/22/19 (!) 96 4 °F (35 8 °C) (Tympanic Core)   06/05/19 97 8 °F (36 6 °C) (Temporal)   02/26/19 97 6 °F (36 4 °C)        BP Readings from Last 3 Encounters:   08/22/19 122/80   06/05/19 132/80   02/26/19 166/94         Pulse Readings from Last 3 Encounters:   08/22/19 57   06/05/19 100   02/26/19 93     @LASTSAO2(3)@

## 2019-09-06 NOTE — PROGRESS NOTES
Assessment and Plan:     Problem List Items Addressed This Visit        Endocrine    Hypothyroidism     To recheck TSH again in February         Relevant Medications    levothyroxine 88 mcg tablet    Other Relevant Orders    TSH, 3rd generation    Controlled type 2 diabetes mellitus without complication, without long-term current use of insulin ([facility] [address] ) - Primary     Lab Results   Component Value Date    HGBA1C 6 1 09/06/2019       No results for input(s): POCGLU in the last 72 hours  Blood Sugar Average: Last 72 hrs:   continue off of Januvia  Continue with healthy diet and regular exercise to lower blood sugar  Relevant Orders    POCT hemoglobin A1c (Completed)    Comprehensive metabolic panel    Hemoglobin A1c (w/out EAG)    Lipid panel    CBC and differential       Cardiovascular and Mediastinum    Benign essential hypertension    Relevant Medications    lisinopril (ZESTRIL) 10 mg tablet       Other    Hyperlipidemia    Relevant Medications    atorvastatin (LIPITOR) 40 mg tablet    Obesity     BMI Counseling: Body mass index is 29 94 kg/m²  Discussed the patient's BMI with her  The BMI is above average  BMI counseling and education was provided to the patient  Nutrition recommendations include 3-5 servings of fruits/vegetables daily               Anxiety    Relevant Medications    buPROPion (WELLBUTRIN XL) 300 mg 24 hr tablet    Depression    Relevant Medications    buPROPion (WELLBUTRIN XL) 300 mg 24 hr tablet      Other Visit Diagnoses     Closed fracture of tooth, initial encounter        Relevant Orders    Ambulatory referral to Dentistry    Medicare annual wellness visit, subsequent             History of Present Illness:     Patient presents for Medicare Annual Wellness visit    Patient Care Team:  Elian Lim PA-C as PCP - General (Family Medicine)  Lety Almodovar MD Royanne Flatter, MD Renata Smoker, MD as Endoscopist     Problem List:     Patient Active Problem List   Diagnosis    Benign essential hypertension    Breast cancer ([address] )    Chronic low back pain    Hyperlipidemia    Hypothyroidism    Lymphedema    Malignant neoplasm of left breast (HCC)    Obesity    Tinea pedis    Agoraphobia    Anxiety    Hyperglycemia    Gastritis    Arthralgia of multiple joints    Depression    Hot flashes    Left ankle pain    Cough    Controlled type 2 diabetes mellitus without complication, without long-term current use of insulin ([address] )    Encounter for hepatitis C screening test for low risk patient    Lower abdominal pain    Dry eye syndrome of both eyes      Past Medical and Surgical History:     Past Medical History:   Diagnosis Date    Anxiety     Breast cancer ([address] )     Cedric  breast cancer; lympedema in (L) arm,R nipple reconstruction today 4/15/2016    Colon polyps     Controlled type 2 diabetes mellitus without complication, without long-term current use of insulin ([address] ) 2/26/2019    Depression     Diverticulosis of colon     Encounter for screening colonoscopy     Gastritis     Hemorrhoids     High cholesterol     History of chemotherapy     Hypothyroidism     Lymphedema     L arm post radiation    S/P radiation therapy     2010 - 2012 to (L) breast along with chemo   Thyroid cancer ([address] )     Wears glasses      Past Surgical History:   Procedure Laterality Date    BREAST BIOPSY      percutan needle core use imag guide (Stereotactic)    BREAST LUMPECTOMY Left     BREAST SURGERY Bilateral     COLONOSCOPY      INCISIONAL BREAST BIOPSY      MASTECTOMY, RADICAL Right     [place] COLONOSCOPY FLX DX W/COLLJ SPEC WHEN PFRMD N/A 12/13/2016    Procedure: COLONOSCOPY;  Surgeon: Domingo Hyde MD;  Location: [facility];   Service: General    NJ DELAY BREAST PROS AFTER BREAST SURG Right 1/22/2016    Procedure: EXCHANGE RIGHT BREAST  IMPLANT/EXPANDER ;  Surgeon: Anamaria Hay MD;  Location: [facility];  Service: Plastics    [place] NIPPLE/AREOLA RECONSTRUCTION Right 4/15/2016    Procedure: RECONSTRUCTION NIPPLE;  Surgeon: Ovidio Bae MD;  Location: [facility];  Service: Plastics    GA REMOVAL OF BREAST CAPSULE Right 2016    Procedure: CAPSULECTOMY;  Surgeon: Ovidio Bae MD;  Location: [facility];  Service: Plastics    THYROIDECTOMY      CA    TUBAL LIGATION        Family History:     Family History   Problem Relation Age of Onset    Diabetes Mother     Other Mother         Forgetfulness    No Known Problems Father     Cervical cancer Sister     Liver disease Brother     Cancer Family       Social History:     Social History     Tobacco Use   Smoking Status Former Smoker    Packs/day: 0 50    Last attempt to quit: 2010    Years since quittin 6   Smokeless Tobacco Former User     Social History     Substance and Sexual Activity   Alcohol Use Yes    Comment: Socially     Social History     Substance and Sexual Activity   Drug Use No      Medications and Allergies:     Current Outpatient Medications   Medication Sig Dispense Refill    anastrozole (ARIMIDEX) 1 mg tablet Take 1 tablet (1 mg total) by mouth daily 90 tablet 1    atorvastatin (LIPITOR) 40 mg tablet Take 1 tablet (40 mg total) by mouth daily 90 tablet 1    buPROPion (WELLBUTRIN XL) 300 mg 24 hr tablet Take 1 tablet (300 mg total) by mouth daily 90 tablet 0    Calcium Citrate 1040 MG TABS Take 1 tablet by mouth daily      famotidine (PEPCID) 40 MG tablet Take 1 tablet (40 mg total) by mouth daily at bedtime as needed for indigestion or heartburn (only takes prn ) 30 tablet 1    levothyroxine 88 mcg tablet Take 1 tablet (88 mcg total) by mouth daily 90 tablet 1    lisinopril (ZESTRIL) 10 mg tablet Take 1 tablet (10 mg total) by mouth daily in the early morning 90 tablet 1    Blood Glucose Monitoring Suppl (FREESTYLE LITE) PARESH Testing twice daily (Patient not taking: Reported on 2019) 1 each 0    glucose blood (FREESTYLE LITE) test strip Testing twice daily (Patient not taking: Reported on 9/6/2019) 100 each 5    ketoconazole (NIZORAL) 2 % cream 2 (two) times a day Apply sparingly to affected area(s)  3    Lancets (FREESTYLE) lancets Testing twice daily (Patient not taking: Reported on 9/6/2019) 100 each 5    PHARMACIST CHOICE ALCOHOL 70 % PADS 3 TIMES A DAY BEFORE NAF AFTER BLOODSUGAR TESTING (Patient not taking: Reported on 9/6/2019) 100 each 2     No current facility-administered medications for this visit  Allergies   Allergen Reactions    Morphine And Related Itching      Immunizations:     Immunization History   Administered Date(s) Administered     Influenza (IM) Preservative Free 11/14/2016    Influenza TIV (IM) 11/06/2014    Influenza, recombinant, quadrivalent,injectable, preservative free 10/22/2018    Pneumococcal Polysaccharide PPV23 10/22/2018      Medicare Screening Tests and Risk Assessments:     Ancelmo Miller is here for her Subsequent Wellness visit  Health Risk Assessment:  Patient rates overall health as good  Patient feels that their physical health rating is Same  Eyesight was rated as Same  Hearing was rated as Same  Patient feels that their emotional and mental health rating is Same  Pain experienced by patient in the last 7 days has been Some  Patient's pain rating has been 4/10  Patient states that she has experienced no weight loss or gain in last 6 months  Emotional/Mental Health:  Patient has not been feeling nervous/anxious  PHQ-9 Depression Screening:    Frequency of the following problems over the past two weeks:      1  Little interest or pleasure in doing things: 0 - not at all      2  Feeling down, depressed, or hopeless: 1 - several days  PHQ-2 Score: 1          Broken Bones/Falls: Fall Risk Assessment:    In the past year, patient has experienced: No history of falling in past year          Bladder/Bowel:  Patient has not leaked urine accidently in the last six months    Patient reports no loss of bowel control  Immunizations:  Patient has had a flu vaccination within the last year  Patient has received a pneumonia shot  Patient has not received a shingles shot  Patient has not received tetanus/diphtheria shot  Home Safety:  Patient has trouble with stairs inside or outside of their home  Patient currently reports that there are no safety hazards present in home, working smoke alarms, working carbon monoxide detectors  Preventative Screenings:   Breast cancer screening performed, colon cancer screen completed, cholesterol screen completed, glaucoma eye exam completed,     Nutrition:  Current diet: Regular and Limited junk food with servings of the following:    Medications:  Patient is currently taking over-the-counter supplements  List of OTC medications includes: takes calcium OTC  Patient is able to manage medications  Lifestyle Choices:  Patient reports no tobacco use  Patient has not smoked or used tobacco in the past   Patient reports alcohol use  Alcohol use per week: occasionally   Patient does not drive a vehicle  Patient wears seat belt  Current level of exercise of physical activity described by patient as: Walk alot   Activities of Daily Living:  Can get out of bed by his or her self, able to dress self, able to make own meals, able to do own shopping, able to bathe self, can do own laundry/housekeeping, can manage own money, pay bills and track expenses    Previous Hospitalizations:  No hospitalization or ED visit in past 12 months        Advanced Directives:  Patient has decided on a power of   Patient has not spoken to designated power of   Patient has not completed advanced directive          Preventative Screening/Counseling:      Cardiovascular:      General: Screening Current      Counseling: Healthy Diet and Healthy Weight          Diabetes:      General: Screening Current      Counseling: Healthy Diet and Healthy Weight Colorectal Cancer:      General: Screening Current          Breast Cancer:      General: Screening Current          Cervical Cancer:      General: Screening Current          Osteoporosis:      General: Screening Not Indicated          AAA:      General: Screening Not Indicated          Glaucoma:      General: Risks and Benefits Discussed      Referrals: Ophthalmology          HIV:      General: Screening Current and Screening Not Indicated          Hepatitis C:      General: Screening Current        Advanced Directives:   Patient has no living will for healthcare, does not have durable POA for healthcare, patient does not have an advanced directive  Information on ACP and/or AD provided  5 wishes given     Additional Comments: Patient not ready to discuss at this time  Immunizations:      Influenza: Influenza Recommended Annually

## 2019-09-06 NOTE — PATIENT INSTRUCTIONS
Obesity   AMBULATORY CARE:   Obesity  is when your body mass index (BMI) is greater than 30  Your healthcare provider will use your height and weight to measure your BMI  The risks of obesity include  many health problems, such as injuries or physical disability  You may need tests to check for the following:  · Diabetes     · High blood pressure or high cholesterol     · Heart disease     · Gallbladder or liver disease     · Cancer of the colon, breast, prostate, liver, or kidney     · Sleep apnea     · Arthritis or gout  Seek care immediately if:   · You have a severe headache, confusion, or difficulty speaking  · You have weakness on one side of your body  · You have chest pain, sweating, or shortness of breath  Contact your healthcare provider if:   · You have symptoms of gallbladder or liver disease, such as pain in your upper abdomen  · You have knee or hip pain and discomfort while walking  · You have symptoms of diabetes, such as intense hunger and thirst, and frequent urination  · You have symptoms of sleep apnea, such as snoring or daytime sleepiness  · You have questions or concerns about your condition or care  Treatment for obesity  focuses on helping you lose weight to improve your health  Even a small decrease in BMI can reduce the risk for many health problems  Your healthcare provider will help you set a weight-loss goal   · Lifestyle changes  are the first step in treating obesity  These include making healthy food choices and getting regular physical activity  Your healthcare provider may suggest a weight-loss program that involves coaching, education, and therapy  · Medicine  may help you lose weight when it is used with a healthy diet and physical activity  · Surgery  can help you lose weight if you are very obese and have other health problems  There are several types of weight-loss surgery  Ask your healthcare provider for more information    Be successful losing weight:   · Set small, realistic goals  An example of a small goal is to walk for 20 minutes 5 days a week  Anther goal is to lose 5% of your body weight  · Tell friends, family members, and coworkers about your goals  and ask for their support  Ask a friend to lose weight with you, or join a weight-loss support group  · Identify foods or triggers that may cause you to overeat , and find ways to avoid them  Remove tempting high-calorie foods from your home and workplace  Place a bowl of fresh fruit on your kitchen counter  If stress causes you to eat, then find other ways to cope with stress  · Keep a diary to track what you eat and drink  Also write down how many minutes of physical activity you do each day  Weigh yourself once a week and record it in your diary  Eating changes: You will need to eat 500 to 1,000 fewer calories each day than you currently eat to lose 1 to 2 pounds a week  The following changes will help you cut calories:  · Eat smaller portions  Use small plates, no larger than 9 inches in diameter  Fill your plate half full of fruits and vegetables  Measure your food using measuring cups until you know what a serving size looks like  · Eat 3 meals and 1 or 2 snacks each day  Plan your meals in advance  [employer] [place] Shows and eat at home most of the time  Eat slowly  · Eat fruits and vegetables at every meal   They are low in calories and high in fiber, which makes you feel full  Do not add butter, margarine, or cream sauce to vegetables  Use herbs to season steamed vegetables  · Eat less fat and fewer fried foods  Eat more baked or grilled chicken and fish  These protein sources are lower in calories and fat than red meat  Limit fast food  Dress your salads with olive oil and vinegar instead of bottled dressing  · Limit the amount of sugar you eat  Do not drink sugary beverages  Limit alcohol  Activity changes:  Physical activity is good for your body in many ways   It helps you burn calories and build strong muscles  It decreases stress and depression, and improves your mood  It can also help you sleep better  Talk to your healthcare provider before you begin an exercise program   · Exercise for at least 30 minutes 5 days a week  Start slowly  Set aside time each day for physical activity that you enjoy and that is convenient for you  It is best to do both weight training and an activity that increases your heart rate, such as walking, bicycling, or swimming  · Find ways to be more active  Do yard work and housecleaning  Walk up the stairs instead of using elevators  Spend your leisure time going to events that require walking, such as outdoor festivals or fairs  This extra physical activity can help you lose weight and keep it off  Follow up with your healthcare provider as directed: You may need to meet with a dietitian  Write down your questions so you remember to ask them during your visits  © 2017 [address] Information is for End User's use only and may not be sold, redistributed or otherwise used for commercial purposes  All illustrations and images included in CareNotes® are the copyrighted property of [employer]  or Jayjay Workman  The above information is an  only  It is not intended as medical advice for individual conditions or treatments  Talk to your doctor, nurse or pharmacist before following any medical regimen to see if it is safe and effective for you  Urinary Incontinence   WHAT YOU NEED TO KNOW:   What is urinary incontinence? Urinary incontinence (UI) is when you lose control of your bladder  What causes UI? UI occurs because your bladder cannot store or empty urine properly  The following are the most common types of UI:  · Stress incontinence  is when you leak urine due to increased bladder pressure  This may happen when you cough, sneeze, or exercise       · Urge incontinence  is when you feel the need to urinate right away and leak urine accidentally  · Mixed incontinence  is when you have both stress and urge UI  What are the signs and symptoms of UI?   · You feel like your bladder does not empty completely when you urinate  · You urinate often and need to urinate immediately  · You leak urine when you sleep, or you wake up with the urge to urinate  · You leak urine when you cough, sneeze, exercise, or laugh  How is UI diagnosed? Your healthcare provider will ask how often you leak urine and whether you have stress or urge symptoms  Tell him which medicines you take, how often you urinate, and how much liquid you drink each day  You may need any of the following tests:  · Urine tests  may show infection or kidney function  · A pelvic exam  may be done to check for blockages  A pelvic exam will also show if your bladder, uterus, or other organs have moved out of place  · An x-ray, ultrasound, or CT  may show problems with parts of your urinary system  You may be given contrast liquid to help your organs show up better in the pictures  Tell the healthcare provider if you have ever had an allergic reaction to contrast liquid  Do not enter the MRI room with anything metal  Metal can cause serious injury  Tell the healthcare provider if you have any metal in or on your body  · A bladder scan  will show how much urine is left in your bladder after you urinate  You will be asked to urinate and then healthcare providers will use a small ultrasound machine to check the urine left in your bladder  · Cystometry  is used to check the function of your urinary system  Your healthcare provider checks the pressure in your bladder while filling it with fluid  Your bladder pressure may also be tested when your bladder is full and while you urinate  How is UI treated? · Medicines  can help strengthen your bladder control      · Electrical stimulation  is used to send a small amount of electrical energy to your pelvic floor muscles  This helps control your bladder function  Electrodes may be placed outside your body or in your rectum  For women, the electrodes may be placed in the vagina  · A bulking agent  may be injected into the wall of your urethra to make it thicker  This helps keep your urethra closed and decreases urine leakage  · Devices  such as a clamp, pessary, or tampon may help stop urine leaks  Ask your healthcare provider for more information about these and other devices  · Surgery  may be needed if other treatments do not work  Several types of surgery can help improve your bladder control  Ask your healthcare provider for more information about the surgery you may need  How can I manage my symptoms? · Do pelvic muscle exercises often  Your pelvic muscles help you stop urinating  Squeeze these muscles tight for 5 seconds, then relax for 5 seconds  Gradually work up to squeezing for 10 seconds  Do 3 sets of 15 repetitions a day, or as directed  This will help strengthen your pelvic muscles and improve bladder control  · A catheter  may be used to help empty your bladder  A catheter is a tiny, plastic tube that is put into your bladder to drain your urine  Your healthcare provider may tell you to use a catheter to prevent your bladder from getting too full and leaking urine  · Keep a UI record  Write down how often you leak urine and how much you leak  Make a note of what you were doing when you leaked urine  · Train your bladder  Go to the bathroom at set times, such as every 2 hours, even if you do not feel the urge to go  You can also try to hold your urine when you feel the urge to go  For example, hold your urine for 5 minutes when you feel the urge to go  As that becomes easier, hold your urine for 10 minutes  · Drink liquids as directed  Ask your healthcare provider how much liquid to drink each day and which liquids are best for you   You may need to limit the amount of liquid you drink to help control your urine leakage  Limit or do not have drinks that contain caffeine or alcohol  Do not drink any liquid right before you go to bed  · Prevent constipation  Eat a variety of high-fiber foods  Good examples are high-fiber cereals, beans, vegetables, and whole-grain breads  Prune juice may help make your bowel movement softer  Walking is the best way to trigger your intestines to have a bowel movement  · Exercise regularly and maintain a healthy weight  Ask your healthcare provider how much you should weigh and about the best exercise plan for you  Weight loss and exercise will decrease pressure on your bladder and help you control your leakage  Ask him to help you create a weight loss plan if you are overweight  When should I seek immediate care? · You have severe pain  · You are confused or cannot think clearly  When should I contact my healthcare provider? · You have a fever  · You see blood in your urine  · You have pain when you urinate  · You have new or worse pain, even after treatment  · Your mouth feels dry or you have vision changes  · Your urine is cloudy or smells bad  · You have questions or concerns about your condition or care  CARE AGREEMENT:   You have the right to help plan your care  Learn about your health condition and how it may be treated  Discuss treatment options with your caregivers to decide what care you want to receive  You always have the right to refuse treatment  The above information is an  only  It is not intended as medical advice for individual conditions or treatments  Talk to your doctor, nurse or pharmacist before following any medical regimen to see if it is safe and effective for you  © 2017 [address] [employer] Information is for End User's use only and may not be sold, redistributed or otherwise used for commercial purposes   All illustrations and images included in CareNotes® are the copyrighted property of [employer] M [employer] Inc  or Jayjay Workman  Cigarette Smoking and Your Health   AMBULATORY CARE:   Risks to your health if you smoke:  Nicotine and other chemicals found in tobacco damage every cell in your body  Even if you are a light smoker, you have an increased risk for cancer, heart disease, and lung disease  If you are pregnant or have diabetes, smoking increases your risk for complications  Benefits to your health if you stop smoking:   · You decrease respiratory symptoms such as coughing, wheezing, and shortness of breath  · You reduce your risk for cancers of the lung, mouth, throat, kidney, bladder, pancreas, stomach, and cervix  If you already have cancer, you increase the benefits of chemotherapy  You also reduce your risk for cancer returning or a second cancer from developing  · You reduce your risk for heart disease, blood clots, heart attack, and stroke  · You reduce your risk for lung infections, and diseases such as pneumonia, asthma, chronic bronchitis, and emphysema  · Your circulation improves  More oxygen can be delivered to your body  If you have diabetes, you lower your risk for complications, such as kidney, artery, and eye diseases  You also lower your risk for nerve damage  Nerve damage can lead to amputations, poor vision, and blindness  · You improve your body's ability to heal and to fight infections  Benefits to the health of others if you stop smoking:  Tobacco is harmful to nonsmokers who breathe in your secondhand smoke  The following are ways the health of others around you may improve when you stop smoking:  · You lower the risks for lung cancer and heart disease in nonsmoking adults  · If you are pregnant, you lower the risk for miscarriage, early delivery, low birth weight, and stillbirth  You also lower your baby's risk for SIDS, obesity, developmental delay, and neurobehavioral problems, such as ADHD  · If you have children, you lower their risk for ear infections, colds, pneumonia, bronchitis, and asthma  For more information and support to stop smoking:   · Smokefree  gov  Phone: 2- 029 - 668-8363  Web Address: www smokefree  gov  Follow up with your healthcare provider as directed:  Write down your questions so you remember to ask them during your visits  © 2017 [address] Information is for End User's use only and may not be sold, redistributed or otherwise used for commercial purposes  All illustrations and images included in CareNotes® are the copyrighted property of [employer]  or Jayjay Workman  The above information is an  only  It is not intended as medical advice for individual conditions or treatments  Talk to your doctor, nurse or pharmacist before following any medical regimen to see if it is safe and effective for you  Fall Prevention   AMBULATORY CARE:   Fall prevention  includes ways to make your home and other areas safer  It also includes ways you can move more carefully to prevent a fall  Health conditions that cause changes in your blood pressure, vision, or muscle strength and coordination may increase your risk for falls  Medicines may also increase your risk for falls if they make you dizzy, weak, or sleepy  Call 911 or have someone else call if:   · You have fallen and are unconscious  · You have fallen and cannot move part of your body  Contact your healthcare provider if:   · You have fallen and have pain or a headache  · You have questions or concerns about your condition or care  Fall prevention tips:   · Stand or sit up slowly  This may help you keep your balance and prevent falls  · Use assistive devices as directed  Your healthcare provider may suggest that you use a cane or walker to help you keep your balance  You may need to have grab bars put in your bathroom near the toilet or in the shower      · Wear shoes that fit well and have soles that   Wear shoes both inside and outside  Use slippers with good   Do not wear shoes with high heels  · Wear a personal alarm  This is a device that allows you to call 911 if you fall and need help  Ask your healthcare provider for more information  · Stay active  Exercise can help strengthen your muscles and improve your balance  Your healthcare provider may recommend water aerobics or walking  He or she may also recommend physical therapy to improve your coordination  Never start an exercise program without talking to your healthcare provider first      · Manage your medical conditions  Keep all appointments with your healthcare providers  Visit your eye doctor as directed  Home safety tips:   · Add items to prevent falls in the bathroom  Put nonslip strips on your bath or shower floor to prevent you from slipping  Use a bath mat if you do not have carpet in the bathroom  This will prevent you from falling when you step out of the bath or shower  Use a shower seat so you do not need to stand while you shower  Sit on the toilet or a chair in your bathroom to dry yourself and put on clothing  This will prevent you from losing your balance from drying or dressing yourself while you are standing  · Keep paths clear  Remove books, shoes, and other objects from walkways and stairs  Place cords for telephones and lamps out of the way so that you do not need to walk over them  Tape them down if you cannot move them  Remove small rugs  If you cannot remove a rug, secure it with double-sided tape  This will prevent you from tripping  · Install bright lights in your home  Use night lights to help light paths to the bathroom or kitchen  Always turn on the light before you start walking  · Keep items you use often on shelves within reach  Do not use a step stool to help you reach an item  · Paint or place reflective tape on the edges of your stairs    This will help you see the stairs better  Follow up with your healthcare provider as directed:  Write down your questions so you remember to ask them during your visits  © 2017 [address] [employer] Information is for End User's use only and may not be sold, redistributed or otherwise used for commercial purposes  All illustrations and images included in CareNotes® are the copyrighted property of [employer]  or Jayjay Workman  The above information is an  only  It is not intended as medical advice for individual conditions or treatments  Talk to your doctor, nurse or pharmacist before following any medical regimen to see if it is safe and effective for you  Advance Directives   WHAT YOU NEED TO KNOW:   What are advance directives? Advance directives are legal documents that state your wishes and plans for medical care  These plans are made ahead of time in case you lose your ability to make decisions for yourself  Advance directives can apply to any medical decision, such as the treatments you want, and if you want to donate organs  What are the types of advance directives? There are many types of advance directives, and each state has rules about how to use them  You may choose a combination of any of the following:  · Living will: This is a written record of the treatment you want  You can also choose which treatments you do not want, which to limit, and which to stop at a certain time  This includes surgery, medicine, IV fluid, and tube feedings  · Durable power of  for healthcare [facility]): This is a written record that states who you want to make healthcare choices for you when you are unable to make them for yourself  This person, called a proxy, is usually a family member or a friend  You may choose more than 1 proxy  · Do not resuscitate (DNR) order:  A DNR order is used in case your heart stops beating or you stop breathing   It is a request not to have certain forms of treatment, such as CPR  A DNR order may be included in other types of advance directives  · Medical directive: This covers the care that you want if you are in a coma, near death, or unable to make decisions for yourself  You can list the treatments you want for each condition  Treatment may include pain medicine, surgery, blood transfusions, dialysis, IV or tube feedings, and a ventilator (breathing machine)  · Values history: This document has questions about your views, beliefs, and how you feel and think about life  This information can help others choose the care that you would choose  Why are advance directives important? An advance directive helps you control your care  Although spoken wishes may be used, it is better to have your wishes written down  Spoken wishes can be misunderstood, or not followed  Treatments may be given even if you do not want them  An advance directive may make it easier for your family to make difficult choices about your care  How do I decide what to put in my advance directives? · Make informed decisions:  Make sure you fully understand treatments or care you may receive  Think about the benefits and problems your decisions could cause for you or your family  Talk to healthcare providers if you have concerns or questions before you write down your wishes  You may also want to talk with your Rastafarian or , or a   Check your state laws to make sure that what you put in your advance directive is legal      · Sign all forms:  Sign and date your advance directive when you have finished  You may also need 2 witnesses to sign the forms  Witnesses cannot be your doctor or his staff, your spouse, heirs or beneficiaries, people you owe money to, or your chosen proxy  Talk to your family, proxy, and healthcare providers about your advance directive  Give each person a copy, and keep one for yourself in a place you can get to easily   Do not keep it hidden or locked away  · Review and revise your plans: You can revise your advance directive at any time, as long as you are able to make decisions  Review your plan every year, and when there are changes in your life, or your health  When you make changes, let your family, proxy, and healthcare providers know  Give each a new copy  Where can I find more information? · [employer]  [address] , [place]  Phone: 7- [phone] - [phone]  Phone: 1- [phone] - [phone]  Web Address: http://www  aafp org  · [address] [facility])  [address] [facility], [address]  Phone: [phone]  Phone: [phone]  Web Address: Juice robertson  CARE AGREEMENT:   You have the right to help plan your care  To help with this plan, you must learn about your health condition and treatment options  You must also learn about advance directives and how they are used  Work with your healthcare providers to decide what care will be used to treat you  You always have the right to refuse treatment  The above information is an  only  It is not intended as medical advice for individual conditions or treatments  Talk to your doctor, nurse or pharmacist before following any medical regimen to see if it is safe and effective for you  © 2017 [address] [employer] [employer] Information is for End User's use only and may not be sold, redistributed or otherwise used for commercial purposes  All illustrations and images included in CareNotes® are the copyrighted property of [employer]  or Jayjay Workman

## 2019-09-06 NOTE — ASSESSMENT & PLAN NOTE
Lab Results   Component Value Date    HGBA1C 6 1 09/06/2019       No results for input(s): POCGLU in the last 72 hours  Blood Sugar Average: Last 72 hrs:   continue off of Januvia  Continue with healthy diet and regular exercise to lower blood sugar

## 2019-09-06 NOTE — ASSESSMENT & PLAN NOTE
BMI Counseling: Body mass index is 29 94 kg/m²  Discussed the patient's BMI with her  The BMI is above average  BMI counseling and education was provided to the patient  Nutrition recommendations include 3-5 servings of fruits/vegetables daily

## 2019-12-17 NOTE — TELEPHONE ENCOUNTER
Pt called stating she needs a letter for her dentist (upstairs) regarding her Diabetes  Pt states she has not taken her medication in a while states you told her to stop  I asked the pt what the letter needed to state specifically pt was unsure

## 2020-01-01 ENCOUNTER — APPOINTMENT (INPATIENT)
Dept: RADIOLOGY | Facility: HOSPITAL | Age: 60
DRG: 545 | End: 2020-01-01
Payer: COMMERCIAL

## 2020-01-01 ENCOUNTER — APPOINTMENT (INPATIENT)
Dept: NON INVASIVE DIAGNOSTICS | Facility: HOSPITAL | Age: 60
DRG: 545 | End: 2020-01-01
Payer: COMMERCIAL

## 2020-01-01 ENCOUNTER — CLINICAL SUPPORT (OUTPATIENT)
Dept: FAMILY MEDICINE CLINIC | Facility: CLINIC | Age: 60
End: 2020-01-01

## 2020-01-01 ENCOUNTER — APPOINTMENT (INPATIENT)
Dept: RADIOLOGY | Facility: HOSPITAL | Age: 60
DRG: 545 | End: 2020-01-01
Attending: INTERNAL MEDICINE
Payer: COMMERCIAL

## 2020-01-01 ENCOUNTER — APPOINTMENT (INPATIENT)
Dept: RADIOLOGY | Facility: HOSPITAL | Age: 60
DRG: 808 | End: 2020-01-01
Attending: STUDENT IN AN ORGANIZED HEALTH CARE EDUCATION/TRAINING PROGRAM
Payer: COMMERCIAL

## 2020-01-01 ENCOUNTER — APPOINTMENT (INPATIENT)
Dept: NUCLEAR MEDICINE | Facility: HOSPITAL | Age: 60
DRG: 808 | End: 2020-01-01
Payer: COMMERCIAL

## 2020-01-01 ENCOUNTER — APPOINTMENT (INPATIENT)
Dept: RADIOLOGY | Facility: HOSPITAL | Age: 60
DRG: 808 | End: 2020-01-01
Payer: COMMERCIAL

## 2020-01-01 ENCOUNTER — APPOINTMENT (INPATIENT)
Dept: CT IMAGING | Facility: HOSPITAL | Age: 60
DRG: 808 | End: 2020-01-01
Payer: COMMERCIAL

## 2020-01-01 ENCOUNTER — TELEMEDICINE (OUTPATIENT)
Dept: FAMILY MEDICINE CLINIC | Facility: CLINIC | Age: 60
End: 2020-01-01

## 2020-01-01 ENCOUNTER — HOSPITAL ENCOUNTER (INPATIENT)
Facility: HOSPITAL | Age: 60
LOS: 7 days | DRG: 808 | End: 2020-05-12
Attending: EMERGENCY MEDICINE | Admitting: INTERNAL MEDICINE
Payer: COMMERCIAL

## 2020-01-01 ENCOUNTER — APPOINTMENT (EMERGENCY)
Dept: CT IMAGING | Facility: HOSPITAL | Age: 60
DRG: 808 | End: 2020-01-01
Payer: COMMERCIAL

## 2020-01-01 ENCOUNTER — HOSPITAL ENCOUNTER (INPATIENT)
Facility: HOSPITAL | Age: 60
LOS: 24 days | DRG: 545 | End: 2020-06-05
Attending: EMERGENCY MEDICINE | Admitting: EMERGENCY MEDICINE
Payer: COMMERCIAL

## 2020-01-01 ENCOUNTER — OFFICE VISIT (OUTPATIENT)
Dept: FAMILY MEDICINE CLINIC | Facility: CLINIC | Age: 60
End: 2020-01-01

## 2020-01-01 ENCOUNTER — APPOINTMENT (INPATIENT)
Dept: NEUROLOGY | Facility: CLINIC | Age: 60
DRG: 545 | End: 2020-01-01
Payer: COMMERCIAL

## 2020-01-01 ENCOUNTER — LAB (OUTPATIENT)
Dept: LAB | Facility: CLINIC | Age: 60
End: 2020-01-01
Payer: COMMERCIAL

## 2020-01-01 VITALS
RESPIRATION RATE: 16 BRPM | BODY MASS INDEX: 29.59 KG/M2 | OXYGEN SATURATION: 96 % | HEART RATE: 99 BPM | HEIGHT: 63 IN | DIASTOLIC BLOOD PRESSURE: 80 MMHG | TEMPERATURE: 97.1 F | SYSTOLIC BLOOD PRESSURE: 110 MMHG | WEIGHT: 167 LBS

## 2020-01-01 VITALS
SYSTOLIC BLOOD PRESSURE: 111 MMHG | BODY MASS INDEX: 27.19 KG/M2 | DIASTOLIC BLOOD PRESSURE: 44 MMHG | WEIGHT: 153.44 LBS | TEMPERATURE: 98.4 F | HEIGHT: 63 IN

## 2020-01-01 VITALS
BODY MASS INDEX: 29.29 KG/M2 | OXYGEN SATURATION: 100 % | SYSTOLIC BLOOD PRESSURE: 184 MMHG | DIASTOLIC BLOOD PRESSURE: 100 MMHG | WEIGHT: 165.34 LBS | HEART RATE: 88 BPM | TEMPERATURE: 97.3 F | RESPIRATION RATE: 25 BRPM

## 2020-01-01 DIAGNOSIS — F32.A DEPRESSION, UNSPECIFIED DEPRESSION TYPE: ICD-10-CM

## 2020-01-01 DIAGNOSIS — C50.911 BILATERAL MALIGNANT NEOPLASM OF BREAST IN FEMALE, ESTROGEN RECEPTOR POSITIVE, UNSPECIFIED SITE OF BREAST (HCC): ICD-10-CM

## 2020-01-01 DIAGNOSIS — D58.9 HEMOLYTIC ANEMIA (HCC): ICD-10-CM

## 2020-01-01 DIAGNOSIS — R56.9 SEIZURE-LIKE ACTIVITY (HCC): Primary | ICD-10-CM

## 2020-01-01 DIAGNOSIS — R82.90 MALODOROUS URINE: ICD-10-CM

## 2020-01-01 DIAGNOSIS — R93.5 ABNORMAL CT OF THE ABDOMEN: ICD-10-CM

## 2020-01-01 DIAGNOSIS — M31.19 TTP (THROMBOTIC THROMBOCYTOPENIC PURPURA) (HCC): ICD-10-CM

## 2020-01-01 DIAGNOSIS — E03.9 HYPOTHYROIDISM, UNSPECIFIED TYPE: ICD-10-CM

## 2020-01-01 DIAGNOSIS — C50.912 BILATERAL MALIGNANT NEOPLASM OF BREAST IN FEMALE, ESTROGEN RECEPTOR POSITIVE, UNSPECIFIED SITE OF BREAST (HCC): ICD-10-CM

## 2020-01-01 DIAGNOSIS — C50.912 MALIGNANT NEOPLASM OF LEFT FEMALE BREAST, UNSPECIFIED ESTROGEN RECEPTOR STATUS, UNSPECIFIED SITE OF BREAST (HCC): ICD-10-CM

## 2020-01-01 DIAGNOSIS — E78.5 HYPERLIPIDEMIA, UNSPECIFIED HYPERLIPIDEMIA TYPE: ICD-10-CM

## 2020-01-01 DIAGNOSIS — I10 BENIGN ESSENTIAL HYPERTENSION: ICD-10-CM

## 2020-01-01 DIAGNOSIS — E66.3 OVERWEIGHT: ICD-10-CM

## 2020-01-01 DIAGNOSIS — F41.9 ANXIETY: ICD-10-CM

## 2020-01-01 DIAGNOSIS — R25.1 TREMORS OF NERVOUS SYSTEM: ICD-10-CM

## 2020-01-01 DIAGNOSIS — J96.90 RESPIRATORY FAILURE (HCC): ICD-10-CM

## 2020-01-01 DIAGNOSIS — E11.9 CONTROLLED TYPE 2 DIABETES MELLITUS WITHOUT COMPLICATION, WITHOUT LONG-TERM CURRENT USE OF INSULIN (HCC): Primary | ICD-10-CM

## 2020-01-01 DIAGNOSIS — R41.82 AMS (ALTERED MENTAL STATUS): ICD-10-CM

## 2020-01-01 DIAGNOSIS — D59.3 HUS (HEMOLYTIC UREMIC SYNDROME), ATYPICAL (HCC): ICD-10-CM

## 2020-01-01 DIAGNOSIS — N17.9 AKI (ACUTE KIDNEY INJURY) (HCC): ICD-10-CM

## 2020-01-01 DIAGNOSIS — E11.9 CONTROLLED TYPE 2 DIABETES MELLITUS WITHOUT COMPLICATION, WITHOUT LONG-TERM CURRENT USE OF INSULIN (HCC): ICD-10-CM

## 2020-01-01 DIAGNOSIS — R10.30 LOWER ABDOMINAL PAIN: Primary | ICD-10-CM

## 2020-01-01 DIAGNOSIS — Z17.0 BILATERAL MALIGNANT NEOPLASM OF BREAST IN FEMALE, ESTROGEN RECEPTOR POSITIVE, UNSPECIFIED SITE OF BREAST (HCC): ICD-10-CM

## 2020-01-01 DIAGNOSIS — K29.70 GASTRITIS, PRESENCE OF BLEEDING UNSPECIFIED, UNSPECIFIED CHRONICITY, UNSPECIFIED GASTRITIS TYPE: ICD-10-CM

## 2020-01-01 DIAGNOSIS — D64.9 ANEMIA: Primary | ICD-10-CM

## 2020-01-01 LAB
ABO GROUP BLD BPU: NORMAL
ABO GROUP BLD: NORMAL
ACANTHOCYTES BLD QL SMEAR: PRESENT
ACTIN IGG SERPL-ACNC: 9 UNITS (ref 0–19)
ALBUMIN SERPL BCP-MCNC: 2 G/DL (ref 3.5–5)
ALBUMIN SERPL BCP-MCNC: 2.7 G/DL (ref 3.5–5)
ALBUMIN SERPL BCP-MCNC: 2.8 G/DL (ref 3.5–5)
ALBUMIN SERPL BCP-MCNC: 2.8 G/DL (ref 3.5–5)
ALBUMIN SERPL BCP-MCNC: 2.9 G/DL (ref 3.5–5)
ALBUMIN SERPL BCP-MCNC: 2.9 G/DL (ref 3.5–5)
ALBUMIN SERPL BCP-MCNC: 3 G/DL (ref 3.5–5)
ALBUMIN SERPL BCP-MCNC: 3.1 G/DL (ref 3.5–5)
ALBUMIN SERPL BCP-MCNC: 3.2 G/DL (ref 3.5–5)
ALBUMIN SERPL BCP-MCNC: 3.3 G/DL (ref 3.5–5)
ALBUMIN SERPL BCP-MCNC: 3.4 G/DL (ref 3.5–5)
ALBUMIN SERPL BCP-MCNC: 3.5 G/DL (ref 3.5–5)
ALBUMIN SERPL BCP-MCNC: 3.5 G/DL (ref 3.5–5)
ALBUMIN SERPL BCP-MCNC: 3.8 G/DL (ref 3.5–5)
ALBUMIN SERPL ELPH-MCNC: 3.15 G/DL (ref 3.5–5)
ALBUMIN SERPL ELPH-MCNC: 61.8 % (ref 52–65)
ALBUMIN UR ELPH-MCNC: 41.6 %
ALP SERPL-CCNC: 125 U/L (ref 46–116)
ALP SERPL-CCNC: 139 U/L (ref 46–116)
ALP SERPL-CCNC: 155 U/L (ref 46–116)
ALP SERPL-CCNC: 166 U/L (ref 46–116)
ALP SERPL-CCNC: 55 U/L (ref 46–116)
ALP SERPL-CCNC: 56 U/L (ref 46–116)
ALP SERPL-CCNC: 56 U/L (ref 46–116)
ALP SERPL-CCNC: 57 U/L (ref 46–116)
ALP SERPL-CCNC: 59 U/L (ref 46–116)
ALP SERPL-CCNC: 61 U/L (ref 46–116)
ALP SERPL-CCNC: 62 U/L (ref 46–116)
ALP SERPL-CCNC: 63 U/L (ref 46–116)
ALP SERPL-CCNC: 64 U/L (ref 46–116)
ALP SERPL-CCNC: 65 U/L (ref 46–116)
ALP SERPL-CCNC: 67 U/L (ref 46–116)
ALP SERPL-CCNC: 69 U/L (ref 46–116)
ALP SERPL-CCNC: 70 U/L (ref 46–116)
ALP SERPL-CCNC: 70 U/L (ref 46–116)
ALP SERPL-CCNC: 71 U/L (ref 46–116)
ALP SERPL-CCNC: 72 U/L (ref 46–116)
ALP SERPL-CCNC: 73 U/L (ref 46–116)
ALP SERPL-CCNC: 74 U/L (ref 46–116)
ALP SERPL-CCNC: 75 U/L (ref 46–116)
ALP SERPL-CCNC: 75 U/L (ref 46–116)
ALP SERPL-CCNC: 77 U/L (ref 46–116)
ALP SERPL-CCNC: 87 U/L (ref 46–116)
ALP SERPL-CCNC: 87 U/L (ref 46–116)
ALP SERPL-CCNC: 99 U/L (ref 46–116)
ALP SERPL-CCNC: 99 U/L (ref 46–116)
ALPHA1 GLOB MFR UR ELPH: 6.1 %
ALPHA1 GLOB SERPL ELPH-MCNC: 0.29 G/DL (ref 0.1–0.4)
ALPHA1 GLOB SERPL ELPH-MCNC: 5.7 % (ref 2.5–5)
ALPHA2 GLOB MFR UR ELPH: 14 %
ALPHA2 GLOB SERPL ELPH-MCNC: 0.44 G/DL (ref 0.4–1.2)
ALPHA2 GLOB SERPL ELPH-MCNC: 8.6 % (ref 7–13)
ALT SERPL W P-5'-P-CCNC: 104 U/L (ref 12–78)
ALT SERPL W P-5'-P-CCNC: 114 U/L (ref 12–78)
ALT SERPL W P-5'-P-CCNC: 135 U/L (ref 12–78)
ALT SERPL W P-5'-P-CCNC: 159 U/L (ref 12–78)
ALT SERPL W P-5'-P-CCNC: 160 U/L (ref 12–78)
ALT SERPL W P-5'-P-CCNC: 184 U/L (ref 12–78)
ALT SERPL W P-5'-P-CCNC: 192 U/L (ref 12–78)
ALT SERPL W P-5'-P-CCNC: 195 U/L (ref 12–78)
ALT SERPL W P-5'-P-CCNC: 209 U/L (ref 12–78)
ALT SERPL W P-5'-P-CCNC: 26 U/L (ref 12–78)
ALT SERPL W P-5'-P-CCNC: 32 U/L (ref 12–78)
ALT SERPL W P-5'-P-CCNC: 34 U/L (ref 12–78)
ALT SERPL W P-5'-P-CCNC: 35 U/L (ref 12–78)
ALT SERPL W P-5'-P-CCNC: 38 U/L (ref 12–78)
ALT SERPL W P-5'-P-CCNC: 40 U/L (ref 12–78)
ALT SERPL W P-5'-P-CCNC: 42 U/L (ref 12–78)
ALT SERPL W P-5'-P-CCNC: 44 U/L (ref 12–78)
ALT SERPL W P-5'-P-CCNC: 50 U/L (ref 12–78)
ALT SERPL W P-5'-P-CCNC: 519 U/L (ref 12–78)
ALT SERPL W P-5'-P-CCNC: 58 U/L (ref 12–78)
ALT SERPL W P-5'-P-CCNC: 59 U/L (ref 12–78)
ALT SERPL W P-5'-P-CCNC: 61 U/L (ref 12–78)
ALT SERPL W P-5'-P-CCNC: 72 U/L (ref 12–78)
ALT SERPL W P-5'-P-CCNC: 82 U/L (ref 12–78)
ALT SERPL W P-5'-P-CCNC: 83 U/L (ref 12–78)
ALT SERPL W P-5'-P-CCNC: 83 U/L (ref 12–78)
ALT SERPL W P-5'-P-CCNC: 85 U/L (ref 12–78)
ALT SERPL W P-5'-P-CCNC: 91 U/L (ref 12–78)
ALT SERPL W P-5'-P-CCNC: 92 U/L (ref 12–78)
ALT SERPL W P-5'-P-CCNC: 92 U/L (ref 12–78)
ALT SERPL W P-5'-P-CCNC: 97 U/L (ref 12–78)
AMMONIA PLAS-SCNC: 37 UMOL/L (ref 11–35)
AMORPH PHOS CRY URNS QL MICRO: ABNORMAL /HPF
AMORPH PHOS CRY URNS QL MICRO: ABNORMAL /HPF
ANION GAP SERPL CALCULATED.3IONS-SCNC: 11 MMOL/L (ref 4–13)
ANION GAP SERPL CALCULATED.3IONS-SCNC: 14 MMOL/L (ref 4–13)
ANION GAP SERPL CALCULATED.3IONS-SCNC: 15 MMOL/L (ref 4–13)
ANION GAP SERPL CALCULATED.3IONS-SCNC: 37 MMOL/L (ref 4–13)
ANION GAP SERPL CALCULATED.3IONS-SCNC: 5 MMOL/L (ref 4–13)
ANION GAP SERPL CALCULATED.3IONS-SCNC: 6 MMOL/L (ref 4–13)
ANION GAP SERPL CALCULATED.3IONS-SCNC: 7 MMOL/L (ref 4–13)
ANION GAP SERPL CALCULATED.3IONS-SCNC: 8 MMOL/L (ref 4–13)
ANION GAP SERPL CALCULATED.3IONS-SCNC: 9 MMOL/L (ref 4–13)
ANISOCYTOSIS BLD QL SMEAR: PRESENT
APTT PPP: 22 SECONDS (ref 23–37)
APTT PPP: 25 SECONDS (ref 23–37)
APTT PPP: 25 SECONDS (ref 23–37)
APTT PPP: 26 SECONDS (ref 23–37)
APTT PPP: 28 SECONDS (ref 23–37)
APTT PPP: 28 SECONDS (ref 23–37)
APTT PPP: 29 SECONDS (ref 23–37)
APTT PPP: 32 SECONDS (ref 23–37)
APTT PPP: 33 SECONDS (ref 23–37)
APTT PPP: 33 SECONDS (ref 23–37)
APTT PPP: 63 SECONDS (ref 23–37)
ARTERIAL PATENCY WRIST A: YES
ARTERIAL PATENCY WRIST A: YES
AST SERPL W P-5'-P-CCNC: 120 U/L (ref 5–45)
AST SERPL W P-5'-P-CCNC: 121 U/L (ref 5–45)
AST SERPL W P-5'-P-CCNC: 131 U/L (ref 5–45)
AST SERPL W P-5'-P-CCNC: 135 U/L (ref 5–45)
AST SERPL W P-5'-P-CCNC: 136 U/L (ref 5–45)
AST SERPL W P-5'-P-CCNC: 137 U/L (ref 5–45)
AST SERPL W P-5'-P-CCNC: 141 U/L (ref 5–45)
AST SERPL W P-5'-P-CCNC: 144 U/L (ref 5–45)
AST SERPL W P-5'-P-CCNC: 146 U/L (ref 5–45)
AST SERPL W P-5'-P-CCNC: 166 U/L (ref 5–45)
AST SERPL W P-5'-P-CCNC: 171 U/L (ref 5–45)
AST SERPL W P-5'-P-CCNC: 174 U/L (ref 5–45)
AST SERPL W P-5'-P-CCNC: 174 U/L (ref 5–45)
AST SERPL W P-5'-P-CCNC: 177 U/L (ref 5–45)
AST SERPL W P-5'-P-CCNC: 192 U/L (ref 5–45)
AST SERPL W P-5'-P-CCNC: 195 U/L (ref 5–45)
AST SERPL W P-5'-P-CCNC: 202 U/L (ref 5–45)
AST SERPL W P-5'-P-CCNC: 204 U/L (ref 5–45)
AST SERPL W P-5'-P-CCNC: 216 U/L (ref 5–45)
AST SERPL W P-5'-P-CCNC: 227 U/L (ref 5–45)
AST SERPL W P-5'-P-CCNC: 261 U/L (ref 5–45)
AST SERPL W P-5'-P-CCNC: 271 U/L (ref 5–45)
AST SERPL W P-5'-P-CCNC: 285 U/L (ref 5–45)
AST SERPL W P-5'-P-CCNC: 297 U/L (ref 5–45)
AST SERPL W P-5'-P-CCNC: 307 U/L (ref 5–45)
AST SERPL W P-5'-P-CCNC: 31 U/L (ref 5–45)
AST SERPL W P-5'-P-CCNC: 49 U/L (ref 5–45)
AST SERPL W P-5'-P-CCNC: 558 U/L (ref 5–45)
AST SERPL W P-5'-P-CCNC: 82 U/L (ref 5–45)
AST SERPL W P-5'-P-CCNC: 90 U/L (ref 5–45)
AST SERPL W P-5'-P-CCNC: 98 U/L (ref 5–45)
ATRIAL RATE: 108 BPM
ATRIAL RATE: 126 BPM
ATRIAL RATE: 90 BPM
B-GLOBULIN MFR UR ELPH: 23.5 %
BACTERIA BLD CULT: NORMAL
BACTERIA BLD CULT: NORMAL
BACTERIA UR CULT: NORMAL
BACTERIA UR QL AUTO: ABNORMAL /HPF
BASE EXCESS BLDA CALC-SCNC: -24 MMOL/L (ref -2–3)
BASE EXCESS BLDA CALC-SCNC: -26 MMOL/L
BASE EXCESS BLDA CALC-SCNC: 10 MMOL/L
BASE EXCESS BLDA CALC-SCNC: 3.8 MMOL/L
BASE EXCESS BLDA CALC-SCNC: 7.2 MMOL/L
BASO STIPL BLD QL SMEAR: PRESENT
BASOPHILS # BLD AUTO: 0.04 THOUSANDS/ΜL (ref 0–0.1)
BASOPHILS # BLD AUTO: 0.09 THOUSANDS/ΜL (ref 0–0.1)
BASOPHILS # BLD AUTO: 0.1 THOUSANDS/ΜL (ref 0–0.1)
BASOPHILS # BLD AUTO: 0.12 THOUSANDS/ΜL (ref 0–0.1)
BASOPHILS # BLD MANUAL: 0 THOUSAND/UL (ref 0–0.1)
BASOPHILS NFR BLD AUTO: 0 % (ref 0–1)
BASOPHILS NFR BLD AUTO: 1 % (ref 0–1)
BASOPHILS NFR MAR MANUAL: 0 % (ref 0–1)
BETA GLOB ABNORMAL SERPL ELPH-MCNC: 0.29 G/DL (ref 0.4–0.8)
BETA1 GLOB SERPL ELPH-MCNC: 5.7 % (ref 5–13)
BETA2 GLOB SERPL ELPH-MCNC: 4.6 % (ref 2–8)
BETA2+GAMMA GLOB SERPL ELPH-MCNC: 0.23 G/DL (ref 0.2–0.5)
BILIRUB DIRECT SERPL-MCNC: 0.36 MG/DL (ref 0–0.2)
BILIRUB DIRECT SERPL-MCNC: 0.5 MG/DL (ref 0–0.2)
BILIRUB DIRECT SERPL-MCNC: 0.55 MG/DL (ref 0–0.2)
BILIRUB DIRECT SERPL-MCNC: 0.77 MG/DL (ref 0–0.2)
BILIRUB SERPL-MCNC: 0.42 MG/DL (ref 0.2–1)
BILIRUB SERPL-MCNC: 2.11 MG/DL (ref 0.2–1)
BILIRUB SERPL-MCNC: 2.2 MG/DL (ref 0.2–1)
BILIRUB SERPL-MCNC: 2.21 MG/DL (ref 0.2–1)
BILIRUB SERPL-MCNC: 2.33 MG/DL (ref 0.2–1)
BILIRUB SERPL-MCNC: 2.39 MG/DL (ref 0.2–1)
BILIRUB SERPL-MCNC: 2.44 MG/DL (ref 0.2–1)
BILIRUB SERPL-MCNC: 2.51 MG/DL (ref 0.2–1)
BILIRUB SERPL-MCNC: 2.52 MG/DL (ref 0.2–1)
BILIRUB SERPL-MCNC: 2.54 MG/DL (ref 0.2–1)
BILIRUB SERPL-MCNC: 2.54 MG/DL (ref 0.2–1)
BILIRUB SERPL-MCNC: 2.56 MG/DL (ref 0.2–1)
BILIRUB SERPL-MCNC: 2.57 MG/DL (ref 0.2–1)
BILIRUB SERPL-MCNC: 2.65 MG/DL (ref 0.2–1)
BILIRUB SERPL-MCNC: 2.66 MG/DL (ref 0.2–1)
BILIRUB SERPL-MCNC: 2.68 MG/DL (ref 0.2–1)
BILIRUB SERPL-MCNC: 2.71 MG/DL (ref 0.2–1)
BILIRUB SERPL-MCNC: 2.71 MG/DL (ref 0.2–1)
BILIRUB SERPL-MCNC: 2.8 MG/DL (ref 0.2–1)
BILIRUB SERPL-MCNC: 2.89 MG/DL (ref 0.2–1)
BILIRUB SERPL-MCNC: 2.93 MG/DL (ref 0.2–1)
BILIRUB SERPL-MCNC: 3.25 MG/DL (ref 0.2–1)
BILIRUB SERPL-MCNC: 3.31 MG/DL (ref 0.2–1)
BILIRUB SERPL-MCNC: 3.44 MG/DL (ref 0.2–1)
BILIRUB SERPL-MCNC: 3.72 MG/DL (ref 0.2–1)
BILIRUB SERPL-MCNC: 3.73 MG/DL (ref 0.2–1)
BILIRUB SERPL-MCNC: 3.77 MG/DL (ref 0.2–1)
BILIRUB SERPL-MCNC: 4.01 MG/DL (ref 0.2–1)
BILIRUB SERPL-MCNC: 4.12 MG/DL (ref 0.2–1)
BILIRUB SERPL-MCNC: 4.21 MG/DL (ref 0.2–1)
BILIRUB SERPL-MCNC: 7.59 MG/DL (ref 0.2–1)
BILIRUB UR QL STRIP: ABNORMAL
BILIRUB UR QL STRIP: NEGATIVE
BLD GP AB SCN SERPL QL: NEGATIVE
BLD SMEAR INTERP: NORMAL
BLD SMEAR INTERP: NORMAL
BPU ID: NORMAL
BUN SERPL-MCNC: 16 MG/DL (ref 5–25)
BUN SERPL-MCNC: 17 MG/DL (ref 5–25)
BUN SERPL-MCNC: 21 MG/DL (ref 5–25)
BUN SERPL-MCNC: 22 MG/DL (ref 5–25)
BUN SERPL-MCNC: 25 MG/DL (ref 5–25)
BUN SERPL-MCNC: 26 MG/DL (ref 5–25)
BUN SERPL-MCNC: 27 MG/DL (ref 5–25)
BUN SERPL-MCNC: 27 MG/DL (ref 5–25)
BUN SERPL-MCNC: 28 MG/DL (ref 5–25)
BUN SERPL-MCNC: 28 MG/DL (ref 5–25)
BUN SERPL-MCNC: 29 MG/DL (ref 5–25)
BUN SERPL-MCNC: 31 MG/DL (ref 5–25)
BUN SERPL-MCNC: 32 MG/DL (ref 5–25)
BUN SERPL-MCNC: 34 MG/DL (ref 5–25)
BUN SERPL-MCNC: 36 MG/DL (ref 5–25)
BUN SERPL-MCNC: 40 MG/DL (ref 5–25)
BUN SERPL-MCNC: 41 MG/DL (ref 5–25)
BUN SERPL-MCNC: 42 MG/DL (ref 5–25)
BUN SERPL-MCNC: 43 MG/DL (ref 5–25)
BUN SERPL-MCNC: 43 MG/DL (ref 5–25)
BUN SERPL-MCNC: 44 MG/DL (ref 5–25)
BUN SERPL-MCNC: 47 MG/DL (ref 5–25)
BUN SERPL-MCNC: 49 MG/DL (ref 5–25)
BUN SERPL-MCNC: 49 MG/DL (ref 5–25)
BUN SERPL-MCNC: 51 MG/DL (ref 5–25)
BUN SERPL-MCNC: 54 MG/DL (ref 5–25)
BUN SERPL-MCNC: 55 MG/DL (ref 5–25)
BUN SERPL-MCNC: 56 MG/DL (ref 5–25)
BUN SERPL-MCNC: 57 MG/DL (ref 5–25)
BUN SERPL-MCNC: 59 MG/DL (ref 5–25)
C3 SERPL-MCNC: 83.4 MG/DL (ref 90–180)
C4 SERPL-MCNC: 12 MG/DL (ref 10–40)
CA-I BLD-SCNC: 0.88 MMOL/L (ref 1.12–1.32)
CA-I BLD-SCNC: 0.98 MMOL/L (ref 1.12–1.32)
CA-I BLD-SCNC: 0.98 MMOL/L (ref 1.12–1.32)
CA-I BLD-SCNC: 1 MMOL/L (ref 1.12–1.32)
CA-I BLD-SCNC: 1 MMOL/L (ref 1.12–1.32)
CA-I BLD-SCNC: 1.01 MMOL/L (ref 1.12–1.32)
CA-I BLD-SCNC: 1.02 MMOL/L (ref 1.12–1.32)
CA-I BLD-SCNC: 1.05 MMOL/L (ref 1.12–1.32)
CA-I BLD-SCNC: 1.06 MMOL/L (ref 1.12–1.32)
CA-I BLD-SCNC: 1.07 MMOL/L (ref 1.12–1.32)
CA-I BLD-SCNC: 1.07 MMOL/L (ref 1.12–1.32)
CA-I BLD-SCNC: 1.08 MMOL/L (ref 1.12–1.32)
CA-I BLD-SCNC: 1.09 MMOL/L (ref 1.12–1.32)
CA-I BLD-SCNC: 1.09 MMOL/L (ref 1.12–1.32)
CA-I BLD-SCNC: 1.1 MMOL/L (ref 1.12–1.32)
CA-I BLD-SCNC: 1.11 MMOL/L (ref 1.12–1.32)
CA-I BLD-SCNC: 1.12 MMOL/L (ref 1.12–1.32)
CA-I BLD-SCNC: 1.12 MMOL/L (ref 1.12–1.32)
CA-I BLD-SCNC: 1.13 MMOL/L (ref 1.12–1.32)
CA-I BLD-SCNC: 1.17 MMOL/L (ref 1.12–1.32)
CALCIUM ALBUM COR SERPL-MCNC: 10.5 MG/DL (ref 8.3–10.1)
CALCIUM SERPL-MCNC: 10.3 MG/DL (ref 8.3–10.1)
CALCIUM SERPL-MCNC: 7.5 MG/DL (ref 8.3–10.1)
CALCIUM SERPL-MCNC: 7.5 MG/DL (ref 8.3–10.1)
CALCIUM SERPL-MCNC: 7.8 MG/DL (ref 8.3–10.1)
CALCIUM SERPL-MCNC: 7.9 MG/DL (ref 8.3–10.1)
CALCIUM SERPL-MCNC: 8 MG/DL (ref 8.3–10.1)
CALCIUM SERPL-MCNC: 8.1 MG/DL (ref 8.3–10.1)
CALCIUM SERPL-MCNC: 8.2 MG/DL (ref 8.3–10.1)
CALCIUM SERPL-MCNC: 8.3 MG/DL (ref 8.3–10.1)
CALCIUM SERPL-MCNC: 8.3 MG/DL (ref 8.3–10.1)
CALCIUM SERPL-MCNC: 8.4 MG/DL (ref 8.3–10.1)
CALCIUM SERPL-MCNC: 8.5 MG/DL (ref 8.3–10.1)
CALCIUM SERPL-MCNC: 8.6 MG/DL (ref 8.3–10.1)
CALCIUM SERPL-MCNC: 8.7 MG/DL (ref 8.3–10.1)
CALCIUM SERPL-MCNC: 8.8 MG/DL (ref 8.3–10.1)
CALCIUM SERPL-MCNC: 8.8 MG/DL (ref 8.3–10.1)
CALCIUM SERPL-MCNC: 8.9 MG/DL (ref 8.3–10.1)
CALCIUM SERPL-MCNC: 8.9 MG/DL (ref 8.3–10.1)
CALCIUM SERPL-MCNC: 9.1 MG/DL (ref 8.3–10.1)
CALCIUM SERPL-MCNC: 9.3 MG/DL (ref 8.3–10.1)
CALCIUM SERPL-MCNC: 9.4 MG/DL (ref 8.3–10.1)
CARDIOLIPIN IGA SER IA-ACNC: <9 APL U/ML (ref 0–11)
CARDIOLIPIN IGG SER IA-ACNC: <9 GPL U/ML (ref 0–14)
CARDIOLIPIN IGM SER IA-ACNC: <9 MPL U/ML (ref 0–12)
CERULOPLASMIN SERPL-MCNC: 25.8 MG/DL (ref 19–39)
CH50 SERPL-ACNC: 43 U/ML
CHLORIDE SERPL-SCNC: 101 MMOL/L (ref 100–108)
CHLORIDE SERPL-SCNC: 102 MMOL/L (ref 100–108)
CHLORIDE SERPL-SCNC: 103 MMOL/L (ref 100–108)
CHLORIDE SERPL-SCNC: 104 MMOL/L (ref 100–108)
CHLORIDE SERPL-SCNC: 104 MMOL/L (ref 100–108)
CHLORIDE SERPL-SCNC: 105 MMOL/L (ref 100–108)
CHLORIDE SERPL-SCNC: 106 MMOL/L (ref 100–108)
CHLORIDE SERPL-SCNC: 107 MMOL/L (ref 100–108)
CHLORIDE SERPL-SCNC: 108 MMOL/L (ref 100–108)
CHLORIDE SERPL-SCNC: 109 MMOL/L (ref 100–108)
CHLORIDE SERPL-SCNC: 110 MMOL/L (ref 100–108)
CHLORIDE SERPL-SCNC: 110 MMOL/L (ref 100–108)
CHLORIDE SERPL-SCNC: 98 MMOL/L (ref 100–108)
CHLORIDE SERPL-SCNC: 98 MMOL/L (ref 100–108)
CHLORIDE SERPL-SCNC: 99 MMOL/L (ref 100–108)
CHOLEST SERPL-MCNC: 153 MG/DL (ref 50–200)
CK MB SERPL-MCNC: 0.3 NG/ML (ref 0–5)
CK MB SERPL-MCNC: 0.6 NG/ML (ref 0–5)
CK MB SERPL-MCNC: 2.1 NG/ML (ref 0–5)
CK MB SERPL-MCNC: <1 % (ref 0–2.5)
CK SERPL-CCNC: 135 U/L (ref 26–192)
CK SERPL-CCNC: 225 U/L (ref 26–192)
CK SERPL-CCNC: 291 U/L (ref 26–192)
CLARITY UR: ABNORMAL
CLARITY UR: CLEAR
CO2 SERPL-SCNC: 20 MMOL/L (ref 21–32)
CO2 SERPL-SCNC: 21 MMOL/L (ref 21–32)
CO2 SERPL-SCNC: 23 MMOL/L (ref 21–32)
CO2 SERPL-SCNC: 24 MMOL/L (ref 21–32)
CO2 SERPL-SCNC: 25 MMOL/L (ref 21–32)
CO2 SERPL-SCNC: 26 MMOL/L (ref 21–32)
CO2 SERPL-SCNC: 27 MMOL/L (ref 21–32)
CO2 SERPL-SCNC: 28 MMOL/L (ref 21–32)
CO2 SERPL-SCNC: 28 MMOL/L (ref 21–32)
CO2 SERPL-SCNC: 29 MMOL/L (ref 21–32)
CO2 SERPL-SCNC: 30 MMOL/L (ref 21–32)
CO2 SERPL-SCNC: 32 MMOL/L (ref 21–32)
CO2 SERPL-SCNC: 32 MMOL/L (ref 21–32)
CO2 SERPL-SCNC: 33 MMOL/L (ref 21–32)
CO2 SERPL-SCNC: 35 MMOL/L (ref 21–32)
CO2 SERPL-SCNC: 35 MMOL/L (ref 21–32)
CO2 SERPL-SCNC: 9 MMOL/L (ref 21–32)
COARSE GRAN CASTS URNS QL MICRO: ABNORMAL /LPF
COLOR UR: ABNORMAL
COLOR UR: YELLOW
COPPER 24H UR-MRATE: 159 UG/24 HR (ref 3–35)
COPPER SERPL-MCNC: 107 UG/DL (ref 72–166)
COPPER UR-MCNC: 106 UG/L
COPPER/CREAT UR: 174 UG/G CREAT (ref 0–49)
CREAT SERPL-MCNC: 1.09 MG/DL (ref 0.6–1.3)
CREAT SERPL-MCNC: 1.22 MG/DL (ref 0.6–1.3)
CREAT SERPL-MCNC: 1.3 MG/DL (ref 0.6–1.3)
CREAT SERPL-MCNC: 1.37 MG/DL (ref 0.6–1.3)
CREAT SERPL-MCNC: 1.38 MG/DL (ref 0.6–1.3)
CREAT SERPL-MCNC: 1.61 MG/DL (ref 0.6–1.3)
CREAT SERPL-MCNC: 1.62 MG/DL (ref 0.6–1.3)
CREAT SERPL-MCNC: 1.63 MG/DL (ref 0.6–1.3)
CREAT SERPL-MCNC: 1.66 MG/DL (ref 0.6–1.3)
CREAT SERPL-MCNC: 1.67 MG/DL (ref 0.6–1.3)
CREAT SERPL-MCNC: 1.69 MG/DL (ref 0.6–1.3)
CREAT SERPL-MCNC: 1.72 MG/DL (ref 0.6–1.3)
CREAT SERPL-MCNC: 1.73 MG/DL (ref 0.6–1.3)
CREAT SERPL-MCNC: 1.75 MG/DL (ref 0.6–1.3)
CREAT SERPL-MCNC: 1.8 MG/DL (ref 0.6–1.3)
CREAT SERPL-MCNC: 1.81 MG/DL (ref 0.6–1.3)
CREAT SERPL-MCNC: 1.83 MG/DL (ref 0.6–1.3)
CREAT SERPL-MCNC: 1.91 MG/DL (ref 0.6–1.3)
CREAT SERPL-MCNC: 2 MG/DL (ref 0.6–1.3)
CREAT SERPL-MCNC: 2.09 MG/DL (ref 0.6–1.3)
CREAT SERPL-MCNC: 2.11 MG/DL (ref 0.6–1.3)
CREAT SERPL-MCNC: 2.13 MG/DL (ref 0.6–1.3)
CREAT SERPL-MCNC: 2.16 MG/DL (ref 0.6–1.3)
CREAT SERPL-MCNC: 2.2 MG/DL (ref 0.6–1.3)
CREAT SERPL-MCNC: 2.2 MG/DL (ref 0.6–1.3)
CREAT SERPL-MCNC: 2.21 MG/DL (ref 0.6–1.3)
CREAT SERPL-MCNC: 2.25 MG/DL (ref 0.6–1.3)
CREAT SERPL-MCNC: 2.27 MG/DL (ref 0.6–1.3)
CREAT SERPL-MCNC: 2.27 MG/DL (ref 0.6–1.3)
CREAT SERPL-MCNC: 2.28 MG/DL (ref 0.6–1.3)
CREAT SERPL-MCNC: 2.29 MG/DL (ref 0.6–1.3)
CREAT SERPL-MCNC: 2.3 MG/DL (ref 0.6–1.3)
CREAT SERPL-MCNC: 2.33 MG/DL (ref 0.6–1.3)
CREAT SERPL-MCNC: 2.35 MG/DL (ref 0.6–1.3)
CREAT SERPL-MCNC: 2.41 MG/DL (ref 0.6–1.3)
CREAT SERPL-MCNC: 2.43 MG/DL (ref 0.6–1.3)
CREAT SERPL-MCNC: 2.45 MG/DL (ref 0.6–1.3)
CREAT SERPL-MCNC: 2.5 MG/DL (ref 0.6–1.3)
CREAT SERPL-MCNC: 3.39 MG/DL (ref 0.6–1.3)
CREAT UR-MCNC: 0.61 G/L (ref 0.3–3)
CREAT UR-MCNC: 239 MG/DL
CROSSMATCH: NORMAL
D DIMER PPP FEU-MCNC: 0.82 UG/ML FEU
D DIMER PPP FEU-MCNC: 8.69 UG/ML FEU
DAT POLY-SP REAG RBC QL: NEGATIVE
DSDNA AB SER-ACNC: 1 IU/ML (ref 0–9)
EOSINOPHIL # BLD AUTO: 0 THOUSAND/ΜL (ref 0–0.61)
EOSINOPHIL # BLD AUTO: 0.04 THOUSAND/ΜL (ref 0–0.61)
EOSINOPHIL # BLD AUTO: 0.14 THOUSAND/ΜL (ref 0–0.61)
EOSINOPHIL # BLD AUTO: 0.17 THOUSAND/ΜL (ref 0–0.61)
EOSINOPHIL # BLD MANUAL: 0 THOUSAND/UL (ref 0–0.4)
EOSINOPHIL # BLD MANUAL: 0.18 THOUSAND/UL (ref 0–0.4)
EOSINOPHIL # BLD MANUAL: 0.3 THOUSAND/UL (ref 0–0.4)
EOSINOPHIL NFR BLD AUTO: 0 % (ref 0–6)
EOSINOPHIL NFR BLD AUTO: 1 % (ref 0–6)
EOSINOPHIL NFR BLD AUTO: 2 % (ref 0–6)
EOSINOPHIL NFR BLD MANUAL: 0 % (ref 0–6)
EOSINOPHIL NFR BLD MANUAL: 1 % (ref 0–6)
EOSINOPHIL NFR BLD MANUAL: 2 % (ref 0–6)
ERYTHROCYTE [DISTWIDTH] IN BLOOD BY AUTOMATED COUNT: 13.8 % (ref 11.6–15.1)
ERYTHROCYTE [DISTWIDTH] IN BLOOD BY AUTOMATED COUNT: 17.3 % (ref 11.6–15.1)
ERYTHROCYTE [DISTWIDTH] IN BLOOD BY AUTOMATED COUNT: 20.9 % (ref 11.6–15.1)
ERYTHROCYTE [DISTWIDTH] IN BLOOD BY AUTOMATED COUNT: 21.9 % (ref 11.6–15.1)
ERYTHROCYTE [DISTWIDTH] IN BLOOD BY AUTOMATED COUNT: 22.8 % (ref 11.6–15.1)
ERYTHROCYTE [DISTWIDTH] IN BLOOD BY AUTOMATED COUNT: 24.1 % (ref 11.6–15.1)
ERYTHROCYTE [DISTWIDTH] IN BLOOD BY AUTOMATED COUNT: 24.9 % (ref 11.6–15.1)
ERYTHROCYTE [DISTWIDTH] IN BLOOD BY AUTOMATED COUNT: 25.7 % (ref 11.6–15.1)
ERYTHROCYTE [DISTWIDTH] IN BLOOD BY AUTOMATED COUNT: 25.7 % (ref 11.6–15.1)
ERYTHROCYTE [DISTWIDTH] IN BLOOD BY AUTOMATED COUNT: 25.8 % (ref 11.6–15.1)
ERYTHROCYTE [DISTWIDTH] IN BLOOD BY AUTOMATED COUNT: 27 % (ref 11.6–15.1)
ERYTHROCYTE [DISTWIDTH] IN BLOOD BY AUTOMATED COUNT: 28.2 % (ref 11.6–15.1)
ERYTHROCYTE [DISTWIDTH] IN BLOOD BY AUTOMATED COUNT: 28.2 % (ref 11.6–15.1)
ERYTHROCYTE [DISTWIDTH] IN BLOOD BY AUTOMATED COUNT: 29.6 % (ref 11.6–15.1)
ERYTHROCYTE [DISTWIDTH] IN BLOOD BY AUTOMATED COUNT: 30.1 % (ref 11.6–15.1)
ERYTHROCYTE [DISTWIDTH] IN BLOOD BY AUTOMATED COUNT: 30.1 % (ref 11.6–15.1)
ERYTHROCYTE [DISTWIDTH] IN BLOOD BY AUTOMATED COUNT: 30.2 % (ref 11.6–15.1)
ERYTHROCYTE [DISTWIDTH] IN BLOOD BY AUTOMATED COUNT: 31.1 % (ref 11.6–15.1)
ERYTHROCYTE [DISTWIDTH] IN BLOOD BY AUTOMATED COUNT: 31.6 % (ref 11.6–15.1)
ERYTHROCYTE [DISTWIDTH] IN BLOOD BY AUTOMATED COUNT: 31.7 % (ref 11.6–15.1)
ERYTHROCYTE [DISTWIDTH] IN BLOOD BY AUTOMATED COUNT: 32 % (ref 11.6–15.1)
ERYTHROCYTE [DISTWIDTH] IN BLOOD BY AUTOMATED COUNT: 32.1 % (ref 11.6–15.1)
ERYTHROCYTE [DISTWIDTH] IN BLOOD BY AUTOMATED COUNT: 32.6 % (ref 11.6–15.1)
ERYTHROCYTE [DISTWIDTH] IN BLOOD BY AUTOMATED COUNT: 32.6 % (ref 11.6–15.1)
ERYTHROCYTE [DISTWIDTH] IN BLOOD BY AUTOMATED COUNT: 33 % (ref 11.6–15.1)
ERYTHROCYTE [DISTWIDTH] IN BLOOD BY AUTOMATED COUNT: 33.4 % (ref 11.6–15.1)
ERYTHROCYTE [DISTWIDTH] IN BLOOD BY AUTOMATED COUNT: 34.4 % (ref 11.6–15.1)
ERYTHROCYTE [DISTWIDTH] IN BLOOD BY AUTOMATED COUNT: 34.5 % (ref 11.6–15.1)
ERYTHROCYTE [DISTWIDTH] IN BLOOD BY AUTOMATED COUNT: 35 % (ref 11.6–15.1)
ERYTHROCYTE [DISTWIDTH] IN BLOOD BY AUTOMATED COUNT: 36 % (ref 11.6–15.1)
ERYTHROCYTE [DISTWIDTH] IN BLOOD BY AUTOMATED COUNT: 36.1 % (ref 11.6–15.1)
ERYTHROCYTE [DISTWIDTH] IN BLOOD BY AUTOMATED COUNT: 36.6 % (ref 11.6–15.1)
ERYTHROCYTE [DISTWIDTH] IN BLOOD BY AUTOMATED COUNT: 37.2 % (ref 11.6–15.1)
ERYTHROCYTE [DISTWIDTH] IN BLOOD BY AUTOMATED COUNT: 37.8 % (ref 11.6–15.1)
ERYTHROCYTE [DISTWIDTH] IN BLOOD BY AUTOMATED COUNT: 38.2 % (ref 11.6–15.1)
ERYTHROCYTE [DISTWIDTH] IN BLOOD BY AUTOMATED COUNT: 38.4 % (ref 11.6–15.1)
ERYTHROCYTE [DISTWIDTH] IN BLOOD BY AUTOMATED COUNT: 39.2 % (ref 11.6–15.1)
ERYTHROCYTE [DISTWIDTH] IN BLOOD BY AUTOMATED COUNT: 39.7 % (ref 11.6–15.1)
ERYTHROCYTE [DISTWIDTH] IN BLOOD BY AUTOMATED COUNT: 40 % (ref 11.6–15.1)
EST. AVERAGE GLUCOSE BLD GHB EST-MCNC: 146 MG/DL
FERRITIN SERPL-MCNC: 1626 NG/ML (ref 8–388)
FERRITIN SERPL-MCNC: 1872 NG/ML (ref 8–388)
FERRITIN SERPL-MCNC: 4404 NG/ML (ref 8–388)
FIBRINOGEN PPP-MCNC: 235 MG/DL (ref 227–495)
FIBRINOGEN PPP-MCNC: 258 MG/DL (ref 227–495)
FIBRINOGEN PPP-MCNC: 261 MG/DL (ref 227–495)
FIBRINOGEN PPP-MCNC: 267 MG/DL (ref 227–495)
FIBRINOGEN PPP-MCNC: 286 MG/DL (ref 227–495)
FIBRINOGEN PPP-MCNC: 291 MG/DL (ref 227–495)
FIBRINOGEN PPP-MCNC: 294 MG/DL (ref 227–495)
FIBRINOGEN PPP-MCNC: 329 MG/DL (ref 227–495)
FIBRINOGEN PPP-MCNC: 356 MG/DL (ref 227–495)
FIBRINOGEN PPP-MCNC: 467 MG/DL (ref 227–495)
FINE GRAN CASTS URNS QL MICRO: ABNORMAL /LPF
FINE GRAN CASTS URNS QL MICRO: ABNORMAL /LPF
FIO2 GAS DIL.REBREATH: 100 L
FIO2 GAS DIL.REBREATH: 100 L
GAMMA GLOB ABNORMAL SERPL ELPH-MCNC: 0.69 G/DL (ref 0.5–1.6)
GAMMA GLOB MFR UR ELPH: 14.8 %
GAMMA GLOB SERPL ELPH-MCNC: 13.6 % (ref 12–22)
GFR SERPL CREATININE-BSD FRML MDRD: 14 ML/MIN/1.73SQ M
GFR SERPL CREATININE-BSD FRML MDRD: 20 ML/MIN/1.73SQ M
GFR SERPL CREATININE-BSD FRML MDRD: 21 ML/MIN/1.73SQ M
GFR SERPL CREATININE-BSD FRML MDRD: 22 ML/MIN/1.73SQ M
GFR SERPL CREATININE-BSD FRML MDRD: 22 ML/MIN/1.73SQ M
GFR SERPL CREATININE-BSD FRML MDRD: 23 ML/MIN/1.73SQ M
GFR SERPL CREATININE-BSD FRML MDRD: 24 ML/MIN/1.73SQ M
GFR SERPL CREATININE-BSD FRML MDRD: 25 ML/MIN/1.73SQ M
GFR SERPL CREATININE-BSD FRML MDRD: 27 ML/MIN/1.73SQ M
GFR SERPL CREATININE-BSD FRML MDRD: 28 ML/MIN/1.73SQ M
GFR SERPL CREATININE-BSD FRML MDRD: 30 ML/MIN/1.73SQ M
GFR SERPL CREATININE-BSD FRML MDRD: 31 ML/MIN/1.73SQ M
GFR SERPL CREATININE-BSD FRML MDRD: 32 ML/MIN/1.73SQ M
GFR SERPL CREATININE-BSD FRML MDRD: 32 ML/MIN/1.73SQ M
GFR SERPL CREATININE-BSD FRML MDRD: 33 ML/MIN/1.73SQ M
GFR SERPL CREATININE-BSD FRML MDRD: 34 ML/MIN/1.73SQ M
GFR SERPL CREATININE-BSD FRML MDRD: 34 ML/MIN/1.73SQ M
GFR SERPL CREATININE-BSD FRML MDRD: 35 ML/MIN/1.73SQ M
GFR SERPL CREATININE-BSD FRML MDRD: 42 ML/MIN/1.73SQ M
GFR SERPL CREATININE-BSD FRML MDRD: 42 ML/MIN/1.73SQ M
GFR SERPL CREATININE-BSD FRML MDRD: 45 ML/MIN/1.73SQ M
GFR SERPL CREATININE-BSD FRML MDRD: 49 ML/MIN/1.73SQ M
GFR SERPL CREATININE-BSD FRML MDRD: 56 ML/MIN/1.73SQ M
GLUCOSE P FAST SERPL-MCNC: 100 MG/DL (ref 65–99)
GLUCOSE SERPL-MCNC: 100 MG/DL (ref 65–140)
GLUCOSE SERPL-MCNC: 101 MG/DL (ref 65–140)
GLUCOSE SERPL-MCNC: 101 MG/DL (ref 65–140)
GLUCOSE SERPL-MCNC: 102 MG/DL (ref 65–140)
GLUCOSE SERPL-MCNC: 103 MG/DL (ref 65–140)
GLUCOSE SERPL-MCNC: 104 MG/DL (ref 65–140)
GLUCOSE SERPL-MCNC: 105 MG/DL (ref 65–140)
GLUCOSE SERPL-MCNC: 107 MG/DL (ref 65–140)
GLUCOSE SERPL-MCNC: 107 MG/DL (ref 65–140)
GLUCOSE SERPL-MCNC: 108 MG/DL (ref 65–140)
GLUCOSE SERPL-MCNC: 109 MG/DL (ref 65–140)
GLUCOSE SERPL-MCNC: 110 MG/DL (ref 65–140)
GLUCOSE SERPL-MCNC: 110 MG/DL (ref 65–140)
GLUCOSE SERPL-MCNC: 111 MG/DL (ref 65–140)
GLUCOSE SERPL-MCNC: 111 MG/DL (ref 65–140)
GLUCOSE SERPL-MCNC: 112 MG/DL (ref 65–140)
GLUCOSE SERPL-MCNC: 113 MG/DL (ref 65–140)
GLUCOSE SERPL-MCNC: 113 MG/DL (ref 65–140)
GLUCOSE SERPL-MCNC: 114 MG/DL (ref 65–140)
GLUCOSE SERPL-MCNC: 115 MG/DL (ref 65–140)
GLUCOSE SERPL-MCNC: 117 MG/DL (ref 65–140)
GLUCOSE SERPL-MCNC: 119 MG/DL (ref 65–140)
GLUCOSE SERPL-MCNC: 120 MG/DL (ref 65–140)
GLUCOSE SERPL-MCNC: 122 MG/DL (ref 65–140)
GLUCOSE SERPL-MCNC: 124 MG/DL (ref 65–140)
GLUCOSE SERPL-MCNC: 125 MG/DL (ref 65–140)
GLUCOSE SERPL-MCNC: 125 MG/DL (ref 65–140)
GLUCOSE SERPL-MCNC: 127 MG/DL (ref 65–140)
GLUCOSE SERPL-MCNC: 129 MG/DL (ref 65–140)
GLUCOSE SERPL-MCNC: 129 MG/DL (ref 65–140)
GLUCOSE SERPL-MCNC: 130 MG/DL (ref 65–140)
GLUCOSE SERPL-MCNC: 134 MG/DL (ref 65–140)
GLUCOSE SERPL-MCNC: 134 MG/DL (ref 65–140)
GLUCOSE SERPL-MCNC: 135 MG/DL (ref 65–140)
GLUCOSE SERPL-MCNC: 138 MG/DL (ref 65–140)
GLUCOSE SERPL-MCNC: 139 MG/DL (ref 65–140)
GLUCOSE SERPL-MCNC: 143 MG/DL (ref 65–140)
GLUCOSE SERPL-MCNC: 144 MG/DL (ref 65–140)
GLUCOSE SERPL-MCNC: 145 MG/DL (ref 65–140)
GLUCOSE SERPL-MCNC: 146 MG/DL (ref 65–140)
GLUCOSE SERPL-MCNC: 147 MG/DL (ref 65–140)
GLUCOSE SERPL-MCNC: 148 MG/DL (ref 65–140)
GLUCOSE SERPL-MCNC: 149 MG/DL (ref 65–140)
GLUCOSE SERPL-MCNC: 150 MG/DL (ref 65–140)
GLUCOSE SERPL-MCNC: 150 MG/DL (ref 65–140)
GLUCOSE SERPL-MCNC: 151 MG/DL (ref 65–140)
GLUCOSE SERPL-MCNC: 151 MG/DL (ref 65–140)
GLUCOSE SERPL-MCNC: 152 MG/DL (ref 65–140)
GLUCOSE SERPL-MCNC: 155 MG/DL (ref 65–140)
GLUCOSE SERPL-MCNC: 156 MG/DL (ref 65–140)
GLUCOSE SERPL-MCNC: 156 MG/DL (ref 65–140)
GLUCOSE SERPL-MCNC: 157 MG/DL (ref 65–140)
GLUCOSE SERPL-MCNC: 158 MG/DL (ref 65–140)
GLUCOSE SERPL-MCNC: 160 MG/DL (ref 65–140)
GLUCOSE SERPL-MCNC: 160 MG/DL (ref 65–140)
GLUCOSE SERPL-MCNC: 162 MG/DL (ref 65–140)
GLUCOSE SERPL-MCNC: 163 MG/DL (ref 65–140)
GLUCOSE SERPL-MCNC: 164 MG/DL (ref 65–140)
GLUCOSE SERPL-MCNC: 167 MG/DL (ref 65–140)
GLUCOSE SERPL-MCNC: 168 MG/DL (ref 65–140)
GLUCOSE SERPL-MCNC: 169 MG/DL (ref 65–140)
GLUCOSE SERPL-MCNC: 170 MG/DL (ref 65–140)
GLUCOSE SERPL-MCNC: 171 MG/DL (ref 65–140)
GLUCOSE SERPL-MCNC: 171 MG/DL (ref 65–140)
GLUCOSE SERPL-MCNC: 174 MG/DL (ref 65–140)
GLUCOSE SERPL-MCNC: 176 MG/DL (ref 65–140)
GLUCOSE SERPL-MCNC: 178 MG/DL (ref 65–140)
GLUCOSE SERPL-MCNC: 180 MG/DL (ref 65–140)
GLUCOSE SERPL-MCNC: 182 MG/DL (ref 65–140)
GLUCOSE SERPL-MCNC: 186 MG/DL (ref 65–140)
GLUCOSE SERPL-MCNC: 187 MG/DL (ref 65–140)
GLUCOSE SERPL-MCNC: 190 MG/DL (ref 65–140)
GLUCOSE SERPL-MCNC: 191 MG/DL (ref 65–140)
GLUCOSE SERPL-MCNC: 196 MG/DL (ref 65–140)
GLUCOSE SERPL-MCNC: 199 MG/DL (ref 65–140)
GLUCOSE SERPL-MCNC: 204 MG/DL (ref 65–140)
GLUCOSE SERPL-MCNC: 204 MG/DL (ref 65–140)
GLUCOSE SERPL-MCNC: 205 MG/DL (ref 65–140)
GLUCOSE SERPL-MCNC: 207 MG/DL (ref 65–140)
GLUCOSE SERPL-MCNC: 208 MG/DL (ref 65–140)
GLUCOSE SERPL-MCNC: 212 MG/DL (ref 65–140)
GLUCOSE SERPL-MCNC: 220 MG/DL (ref 65–140)
GLUCOSE SERPL-MCNC: 220 MG/DL (ref 65–140)
GLUCOSE SERPL-MCNC: 221 MG/DL (ref 65–140)
GLUCOSE SERPL-MCNC: 226 MG/DL (ref 65–140)
GLUCOSE SERPL-MCNC: 239 MG/DL (ref 65–140)
GLUCOSE SERPL-MCNC: 240 MG/DL (ref 65–140)
GLUCOSE SERPL-MCNC: 243 MG/DL (ref 65–140)
GLUCOSE SERPL-MCNC: 267 MG/DL (ref 65–140)
GLUCOSE SERPL-MCNC: 289 MG/DL (ref 65–140)
GLUCOSE SERPL-MCNC: 308 MG/DL (ref 65–140)
GLUCOSE SERPL-MCNC: 313 MG/DL (ref 65–140)
GLUCOSE SERPL-MCNC: 334 MG/DL (ref 65–140)
GLUCOSE SERPL-MCNC: 77 MG/DL (ref 65–140)
GLUCOSE SERPL-MCNC: 80 MG/DL (ref 65–140)
GLUCOSE SERPL-MCNC: 87 MG/DL (ref 65–140)
GLUCOSE SERPL-MCNC: 88 MG/DL (ref 65–140)
GLUCOSE SERPL-MCNC: 89 MG/DL (ref 65–140)
GLUCOSE SERPL-MCNC: 92 MG/DL (ref 65–140)
GLUCOSE SERPL-MCNC: 92 MG/DL (ref 65–140)
GLUCOSE SERPL-MCNC: 99 MG/DL (ref 65–140)
GLUCOSE SERPL-MCNC: 99 MG/DL (ref 65–140)
GLUCOSE UR STRIP-MCNC: ABNORMAL MG/DL
GLUCOSE UR STRIP-MCNC: NEGATIVE MG/DL
HAPTOGLOB SERPL-MCNC: <10 MG/DL (ref 33–346)
HAPTOGLOB SERPL-MCNC: <10 MG/DL (ref 33–346)
HBA1C MFR BLD: 6.7 %
HBV CORE AB SER QL: NORMAL
HBV SURFACE AG SER QL: NORMAL
HCO3 BLDA-SCNC: 27.8 MMOL/L (ref 22–28)
HCO3 BLDA-SCNC: 29.5 MMOL/L (ref 22–28)
HCO3 BLDA-SCNC: 31.9 MMOL/L (ref 22–28)
HCO3 BLDA-SCNC: 4.6 MMOL/L (ref 22–28)
HCO3 BLDA-SCNC: 4.8 MMOL/L (ref 22–28)
HCT VFR BLD AUTO: 15.2 % (ref 34.8–46.1)
HCT VFR BLD AUTO: 16.8 % (ref 34.8–46.1)
HCT VFR BLD AUTO: 16.8 % (ref 34.8–46.1)
HCT VFR BLD AUTO: 17.3 % (ref 34.8–46.1)
HCT VFR BLD AUTO: 18.6 % (ref 34.8–46.1)
HCT VFR BLD AUTO: 19.3 % (ref 34.8–46.1)
HCT VFR BLD AUTO: 20.3 % (ref 34.8–46.1)
HCT VFR BLD AUTO: 20.4 % (ref 34.8–46.1)
HCT VFR BLD AUTO: 20.5 % (ref 34.8–46.1)
HCT VFR BLD AUTO: 20.8 % (ref 34.8–46.1)
HCT VFR BLD AUTO: 20.9 % (ref 34.8–46.1)
HCT VFR BLD AUTO: 21 % (ref 34.8–46.1)
HCT VFR BLD AUTO: 21.1 % (ref 34.8–46.1)
HCT VFR BLD AUTO: 21.1 % (ref 34.8–46.1)
HCT VFR BLD AUTO: 21.7 % (ref 34.8–46.1)
HCT VFR BLD AUTO: 21.8 % (ref 34.8–46.1)
HCT VFR BLD AUTO: 21.8 % (ref 34.8–46.1)
HCT VFR BLD AUTO: 21.9 % (ref 34.8–46.1)
HCT VFR BLD AUTO: 22.2 % (ref 34.8–46.1)
HCT VFR BLD AUTO: 22.3 % (ref 34.8–46.1)
HCT VFR BLD AUTO: 22.3 % (ref 34.8–46.1)
HCT VFR BLD AUTO: 22.7 % (ref 34.8–46.1)
HCT VFR BLD AUTO: 22.7 % (ref 34.8–46.1)
HCT VFR BLD AUTO: 22.8 % (ref 34.8–46.1)
HCT VFR BLD AUTO: 22.9 % (ref 34.8–46.1)
HCT VFR BLD AUTO: 23 % (ref 34.8–46.1)
HCT VFR BLD AUTO: 23 % (ref 34.8–46.1)
HCT VFR BLD AUTO: 23.1 % (ref 34.8–46.1)
HCT VFR BLD AUTO: 23.4 % (ref 34.8–46.1)
HCT VFR BLD AUTO: 23.4 % (ref 34.8–46.1)
HCT VFR BLD AUTO: 23.5 % (ref 34.8–46.1)
HCT VFR BLD AUTO: 23.7 % (ref 34.8–46.1)
HCT VFR BLD AUTO: 23.9 % (ref 34.8–46.1)
HCT VFR BLD AUTO: 24 % (ref 34.8–46.1)
HCT VFR BLD AUTO: 24 % (ref 34.8–46.1)
HCT VFR BLD AUTO: 24.1 % (ref 34.8–46.1)
HCT VFR BLD AUTO: 24.1 % (ref 34.8–46.1)
HCT VFR BLD AUTO: 24.7 % (ref 34.8–46.1)
HCT VFR BLD AUTO: 24.9 % (ref 34.8–46.1)
HCT VFR BLD AUTO: 25 % (ref 34.8–46.1)
HCT VFR BLD AUTO: 25 % (ref 34.8–46.1)
HCT VFR BLD AUTO: 25.4 % (ref 34.8–46.1)
HCT VFR BLD AUTO: 25.6 % (ref 34.8–46.1)
HCT VFR BLD AUTO: 26 % (ref 34.8–46.1)
HCT VFR BLD AUTO: 27 % (ref 34.8–46.1)
HCT VFR BLD AUTO: 27.6 % (ref 34.8–46.1)
HCT VFR BLD AUTO: 29.1 % (ref 34.8–46.1)
HCT VFR BLD AUTO: 36.7 % (ref 34.8–46.1)
HCT VFR BLD CALC: 16 % (ref 34.8–46.1)
HCT VFR BLD CALC: 25 % (ref 34.8–46.1)
HCYS SERPL-SCNC: 26 UMOL/L (ref 3.7–11.2)
HDLC SERPL-MCNC: 65 MG/DL
HELMET CELLS BLD QL SMEAR: PRESENT
HEMOCCULT STL QL: NEGATIVE
HGB BLD-MCNC: 11.4 G/DL (ref 11.5–15.4)
HGB BLD-MCNC: 5 G/DL (ref 11.5–15.4)
HGB BLD-MCNC: 5.2 G/DL (ref 11.5–15.4)
HGB BLD-MCNC: 5.4 G/DL (ref 11.5–15.4)
HGB BLD-MCNC: 5.5 G/DL (ref 11.5–15.4)
HGB BLD-MCNC: 5.9 G/DL (ref 11.5–15.4)
HGB BLD-MCNC: 6.3 G/DL (ref 11.5–15.4)
HGB BLD-MCNC: 6.5 G/DL (ref 11.5–15.4)
HGB BLD-MCNC: 6.6 G/DL (ref 11.5–15.4)
HGB BLD-MCNC: 6.6 G/DL (ref 11.5–15.4)
HGB BLD-MCNC: 6.7 G/DL (ref 11.5–15.4)
HGB BLD-MCNC: 6.7 G/DL (ref 11.5–15.4)
HGB BLD-MCNC: 6.8 G/DL (ref 11.5–15.4)
HGB BLD-MCNC: 7 G/DL (ref 11.5–15.4)
HGB BLD-MCNC: 7 G/DL (ref 11.5–15.4)
HGB BLD-MCNC: 7.1 G/DL (ref 11.5–15.4)
HGB BLD-MCNC: 7.1 G/DL (ref 11.5–15.4)
HGB BLD-MCNC: 7.2 G/DL (ref 11.5–15.4)
HGB BLD-MCNC: 7.3 G/DL (ref 11.5–15.4)
HGB BLD-MCNC: 7.3 G/DL (ref 11.5–15.4)
HGB BLD-MCNC: 7.4 G/DL (ref 11.5–15.4)
HGB BLD-MCNC: 7.6 G/DL (ref 11.5–15.4)
HGB BLD-MCNC: 7.9 G/DL (ref 11.5–15.4)
HGB BLD-MCNC: 8 G/DL (ref 11.5–15.4)
HGB BLD-MCNC: 8.1 G/DL (ref 11.5–15.4)
HGB BLD-MCNC: 8.1 G/DL (ref 11.5–15.4)
HGB BLD-MCNC: 8.2 G/DL (ref 11.5–15.4)
HGB BLD-MCNC: 8.2 G/DL (ref 11.5–15.4)
HGB BLD-MCNC: 8.3 G/DL (ref 11.5–15.4)
HGB BLD-MCNC: 8.3 G/DL (ref 11.5–15.4)
HGB BLD-MCNC: 8.5 G/DL (ref 11.5–15.4)
HGB BLD-MCNC: 8.6 G/DL (ref 11.5–15.4)
HGB BLD-MCNC: 8.7 G/DL (ref 11.5–15.4)
HGB BLD-MCNC: 9 G/DL (ref 11.5–15.4)
HGB BLD-MCNC: 9.3 G/DL (ref 11.5–15.4)
HGB BLD-MCNC: 9.7 G/DL (ref 11.5–15.4)
HGB BLDA-MCNC: 5.4 G/DL (ref 11.5–15.4)
HGB BLDA-MCNC: 8.5 G/DL (ref 11.5–15.4)
HGB UR QL STRIP.AUTO: ABNORMAL
HOROWITZ INDEX BLDA+IHG-RTO: 100 MM[HG]
HOROWITZ INDEX BLDA+IHG-RTO: 40 MM[HG]
HOROWITZ INDEX BLDA+IHG-RTO: 80 MM[HG]
HOWELL-JOLLY BOD BLD QL SMEAR: PRESENT
IGG/ALB SER: 1.62 {RATIO} (ref 1.1–1.8)
IMM GRANULOCYTES # BLD AUTO: 0.07 THOUSAND/UL (ref 0–0.2)
IMM GRANULOCYTES # BLD AUTO: >0.5 THOUSAND/UL (ref 0–0.2)
IMM GRANULOCYTES NFR BLD AUTO: 1 % (ref 0–2)
IMM GRANULOCYTES NFR BLD AUTO: 3 % (ref 0–2)
IMM GRANULOCYTES NFR BLD AUTO: 6 % (ref 0–2)
IMM GRANULOCYTES NFR BLD AUTO: 6 % (ref 0–2)
IMM GRANULOCYTES NFR BLD AUTO: 7 % (ref 0–2)
IMM GRANULOCYTES NFR BLD AUTO: 7 % (ref 0–2)
IMM GRANULOCYTES NFR BLD AUTO: 9 % (ref 0–2)
INR PPP: 1.13 (ref 0.84–1.19)
INR PPP: 1.15 (ref 0.84–1.19)
INR PPP: 1.17 (ref 0.84–1.19)
INR PPP: 1.18 (ref 0.84–1.19)
INR PPP: 1.2 (ref 0.84–1.19)
INR PPP: 1.22 (ref 0.84–1.19)
INR PPP: 1.23 (ref 0.84–1.19)
INR PPP: 1.27 (ref 0.84–1.19)
INR PPP: 1.27 (ref 0.84–1.19)
INR PPP: 1.32 (ref 0.84–1.19)
INR PPP: 1.37 (ref 0.84–1.19)
INR PPP: 2.62 (ref 0.84–1.19)
INTERPRETATION UR IFE-IMP: NORMAL
IRON SATN MFR SERPL: 100 %
IRON SATN MFR SERPL: 92 %
IRON SATN MFR SERPL: 97 %
IRON SERPL-MCNC: 224 UG/DL (ref 50–170)
IRON SERPL-MCNC: 228 UG/DL (ref 50–170)
IRON SERPL-MCNC: 252 UG/DL (ref 50–170)
KAPPA LC FREE SER-MCNC: 28.5 MG/L (ref 3.3–19.4)
KAPPA LC FREE/LAMBDA FREE SER: 1.66 {RATIO} (ref 0.26–1.65)
KETONES UR STRIP-MCNC: ABNORMAL MG/DL
KETONES UR STRIP-MCNC: NEGATIVE MG/DL
LACTATE SERPL-SCNC: 1.1 MMOL/L (ref 0.5–2)
LACTATE SERPL-SCNC: 2.9 MMOL/L (ref 0.5–2)
LACTATE SERPL-SCNC: 3.2 MMOL/L (ref 0.5–2)
LACTATE SERPL-SCNC: 32.4 MMOL/L (ref 0.5–2)
LAMBDA LC FREE SERPL-MCNC: 17.2 MG/L (ref 5.7–26.3)
LDH SERPL-CCNC: 1049 U/L (ref 81–234)
LDH SERPL-CCNC: 1209 U/L (ref 81–234)
LDH SERPL-CCNC: 1216 U/L (ref 81–234)
LDH SERPL-CCNC: 1292 U/L (ref 81–234)
LDH SERPL-CCNC: 1383 U/L (ref 81–234)
LDH SERPL-CCNC: 1389 U/L (ref 81–234)
LDH SERPL-CCNC: 1464 U/L (ref 81–234)
LDH SERPL-CCNC: 1474 U/L (ref 81–234)
LDH SERPL-CCNC: 1507 U/L (ref 81–234)
LDH SERPL-CCNC: 1508 U/L (ref 81–234)
LDH SERPL-CCNC: 1510 U/L (ref 81–234)
LDH SERPL-CCNC: 1512 U/L (ref 81–234)
LDH SERPL-CCNC: 1574 U/L (ref 81–234)
LDH SERPL-CCNC: 1625 U/L (ref 81–234)
LDH SERPL-CCNC: 1649 U/L (ref 81–234)
LDH SERPL-CCNC: 1733 U/L (ref 81–234)
LDH SERPL-CCNC: 1804 U/L (ref 81–234)
LDH SERPL-CCNC: 1865 U/L (ref 81–234)
LDH SERPL-CCNC: 1953 U/L (ref 81–234)
LDH SERPL-CCNC: 1976 U/L (ref 81–234)
LDH SERPL-CCNC: 2047 U/L (ref 81–234)
LDH SERPL-CCNC: 2132 U/L (ref 81–234)
LDH SERPL-CCNC: 2150 U/L (ref 81–234)
LDH SERPL-CCNC: 2593 U/L (ref 81–234)
LDH SERPL-CCNC: 2667 U/L (ref 81–234)
LDH SERPL-CCNC: 2799 U/L (ref 81–234)
LDH SERPL-CCNC: 3137 U/L (ref 81–234)
LDH SERPL-CCNC: 3347 U/L (ref 81–234)
LDLC SERPL CALC-MCNC: 63 MG/DL (ref 0–100)
LEFT EYE DIABETIC RETINOPATHY: ABNORMAL
LEFT EYE IMAGE QUALITY: ABNORMAL
LEFT EYE MACULAR EDEMA: ABNORMAL
LEFT EYE OTHER RETINOPATHY: ABNORMAL
LEUKOCYTE ESTERASE UR QL STRIP: ABNORMAL
LEUKOCYTE ESTERASE UR QL STRIP: NEGATIVE
LIPASE SERPL-CCNC: 358 U/L (ref 73–393)
LYMPHOCYTES # BLD AUTO: 1.78 THOUSAND/UL (ref 0.6–4.47)
LYMPHOCYTES # BLD AUTO: 10 % (ref 14–44)
LYMPHOCYTES # BLD AUTO: 10 THOUSAND/UL (ref 0.6–4.47)
LYMPHOCYTES # BLD AUTO: 14 % (ref 14–44)
LYMPHOCYTES # BLD AUTO: 17 % (ref 14–44)
LYMPHOCYTES # BLD AUTO: 19 % (ref 14–44)
LYMPHOCYTES # BLD AUTO: 2.19 THOUSAND/UL (ref 0.6–4.47)
LYMPHOCYTES # BLD AUTO: 2.5 THOUSANDS/ΜL (ref 0.6–4.47)
LYMPHOCYTES # BLD AUTO: 2.64 THOUSAND/UL (ref 0.6–4.47)
LYMPHOCYTES # BLD AUTO: 22 % (ref 14–44)
LYMPHOCYTES # BLD AUTO: 23 % (ref 14–44)
LYMPHOCYTES # BLD AUTO: 3.56 THOUSAND/UL (ref 0.6–4.47)
LYMPHOCYTES # BLD AUTO: 3.88 THOUSAND/UL (ref 0.6–4.47)
LYMPHOCYTES # BLD AUTO: 30 % (ref 14–44)
LYMPHOCYTES # BLD AUTO: 32 % (ref 14–44)
LYMPHOCYTES # BLD AUTO: 33 % (ref 14–44)
LYMPHOCYTES # BLD AUTO: 37 % (ref 14–44)
LYMPHOCYTES # BLD AUTO: 4.31 THOUSAND/UL (ref 0.6–4.47)
LYMPHOCYTES # BLD AUTO: 4.43 THOUSAND/UL (ref 0.6–4.47)
LYMPHOCYTES # BLD AUTO: 4.57 THOUSANDS/ΜL (ref 0.6–4.47)
LYMPHOCYTES # BLD AUTO: 4.61 THOUSANDS/ΜL (ref 0.6–4.47)
LYMPHOCYTES # BLD AUTO: 40 % (ref 14–44)
LYMPHOCYTES # BLD AUTO: 5.09 THOUSANDS/ΜL (ref 0.6–4.47)
LYMPHOCYTES # BLD AUTO: 5.59 THOUSAND/UL (ref 0.6–4.47)
LYMPHOCYTES # BLD AUTO: 5.78 THOUSAND/UL (ref 0.6–4.47)
LYMPHOCYTES # BLD AUTO: 6.13 THOUSAND/UL (ref 0.6–4.47)
LYMPHOCYTES NFR BLD AUTO: 31 % (ref 14–44)
LYMPHOCYTES NFR BLD AUTO: 32 % (ref 14–44)
LYMPHOCYTES NFR BLD AUTO: 33 % (ref 14–44)
LYMPHOCYTES NFR BLD AUTO: 33 % (ref 14–44)
LYMPHOCYTES NFR BLD AUTO: 34 % (ref 14–44)
MACROCYTES BLD QL AUTO: PRESENT
MAGNESIUM SERPL-MCNC: 1.7 MG/DL (ref 1.6–2.6)
MAGNESIUM SERPL-MCNC: 1.8 MG/DL (ref 1.6–2.6)
MAGNESIUM SERPL-MCNC: 1.8 MG/DL (ref 1.6–2.6)
MAGNESIUM SERPL-MCNC: 1.9 MG/DL (ref 1.6–2.6)
MAGNESIUM SERPL-MCNC: 2.1 MG/DL (ref 1.6–2.6)
MAGNESIUM SERPL-MCNC: 2.3 MG/DL (ref 1.6–2.6)
MAGNESIUM SERPL-MCNC: 2.4 MG/DL (ref 1.6–2.6)
MAGNESIUM SERPL-MCNC: 2.5 MG/DL (ref 1.6–2.6)
MAGNESIUM SERPL-MCNC: 2.6 MG/DL (ref 1.6–2.6)
MAGNESIUM SERPL-MCNC: 3.3 MG/DL (ref 1.6–2.6)
MCH RBC QN AUTO: 26.7 PG (ref 26.8–34.3)
MCH RBC QN AUTO: 26.7 PG (ref 26.8–34.3)
MCH RBC QN AUTO: 27.1 PG (ref 26.8–34.3)
MCH RBC QN AUTO: 27.1 PG (ref 26.8–34.3)
MCH RBC QN AUTO: 27.2 PG (ref 26.8–34.3)
MCH RBC QN AUTO: 27.2 PG (ref 26.8–34.3)
MCH RBC QN AUTO: 27.3 PG (ref 26.8–34.3)
MCH RBC QN AUTO: 27.4 PG (ref 26.8–34.3)
MCH RBC QN AUTO: 27.7 PG (ref 26.8–34.3)
MCH RBC QN AUTO: 27.7 PG (ref 26.8–34.3)
MCH RBC QN AUTO: 27.8 PG (ref 26.8–34.3)
MCH RBC QN AUTO: 27.9 PG (ref 26.8–34.3)
MCH RBC QN AUTO: 28 PG (ref 26.8–34.3)
MCH RBC QN AUTO: 28.1 PG (ref 26.8–34.3)
MCH RBC QN AUTO: 28.2 PG (ref 26.8–34.3)
MCH RBC QN AUTO: 28.4 PG (ref 26.8–34.3)
MCH RBC QN AUTO: 28.4 PG (ref 26.8–34.3)
MCH RBC QN AUTO: 28.5 PG (ref 26.8–34.3)
MCH RBC QN AUTO: 28.5 PG (ref 26.8–34.3)
MCH RBC QN AUTO: 28.6 PG (ref 26.8–34.3)
MCH RBC QN AUTO: 28.6 PG (ref 26.8–34.3)
MCH RBC QN AUTO: 28.7 PG (ref 26.8–34.3)
MCH RBC QN AUTO: 28.8 PG (ref 26.8–34.3)
MCH RBC QN AUTO: 29 PG (ref 26.8–34.3)
MCH RBC QN AUTO: 29.1 PG (ref 26.8–34.3)
MCH RBC QN AUTO: 29.2 PG (ref 26.8–34.3)
MCH RBC QN AUTO: 29.5 PG (ref 26.8–34.3)
MCH RBC QN AUTO: 29.6 PG (ref 26.8–34.3)
MCH RBC QN AUTO: 29.9 PG (ref 26.8–34.3)
MCH RBC QN AUTO: 30 PG (ref 26.8–34.3)
MCHC RBC AUTO-ENTMCNC: 31.1 G/DL (ref 31.4–37.4)
MCHC RBC AUTO-ENTMCNC: 31.4 G/DL (ref 31.4–37.4)
MCHC RBC AUTO-ENTMCNC: 31.6 G/DL (ref 31.4–37.4)
MCHC RBC AUTO-ENTMCNC: 31.7 G/DL (ref 31.4–37.4)
MCHC RBC AUTO-ENTMCNC: 31.8 G/DL (ref 31.4–37.4)
MCHC RBC AUTO-ENTMCNC: 32.1 G/DL (ref 31.4–37.4)
MCHC RBC AUTO-ENTMCNC: 32.2 G/DL (ref 31.4–37.4)
MCHC RBC AUTO-ENTMCNC: 32.2 G/DL (ref 31.4–37.4)
MCHC RBC AUTO-ENTMCNC: 32.3 G/DL (ref 31.4–37.4)
MCHC RBC AUTO-ENTMCNC: 32.3 G/DL (ref 31.4–37.4)
MCHC RBC AUTO-ENTMCNC: 32.4 G/DL (ref 31.4–37.4)
MCHC RBC AUTO-ENTMCNC: 32.5 G/DL (ref 31.4–37.4)
MCHC RBC AUTO-ENTMCNC: 32.5 G/DL (ref 31.4–37.4)
MCHC RBC AUTO-ENTMCNC: 32.6 G/DL (ref 31.4–37.4)
MCHC RBC AUTO-ENTMCNC: 32.6 G/DL (ref 31.4–37.4)
MCHC RBC AUTO-ENTMCNC: 32.7 G/DL (ref 31.4–37.4)
MCHC RBC AUTO-ENTMCNC: 32.8 G/DL (ref 31.4–37.4)
MCHC RBC AUTO-ENTMCNC: 32.8 G/DL (ref 31.4–37.4)
MCHC RBC AUTO-ENTMCNC: 32.9 G/DL (ref 31.4–37.4)
MCHC RBC AUTO-ENTMCNC: 32.9 G/DL (ref 31.4–37.4)
MCHC RBC AUTO-ENTMCNC: 33 G/DL (ref 31.4–37.4)
MCHC RBC AUTO-ENTMCNC: 33 G/DL (ref 31.4–37.4)
MCHC RBC AUTO-ENTMCNC: 33.1 G/DL (ref 31.4–37.4)
MCHC RBC AUTO-ENTMCNC: 33.2 G/DL (ref 31.4–37.4)
MCHC RBC AUTO-ENTMCNC: 33.3 G/DL (ref 31.4–37.4)
MCHC RBC AUTO-ENTMCNC: 33.7 G/DL (ref 31.4–37.4)
MCHC RBC AUTO-ENTMCNC: 34 G/DL (ref 31.4–37.4)
MCHC RBC AUTO-ENTMCNC: 34 G/DL (ref 31.4–37.4)
MCHC RBC AUTO-ENTMCNC: 34.2 G/DL (ref 31.4–37.4)
MCHC RBC AUTO-ENTMCNC: 34.6 G/DL (ref 31.4–37.4)
MCV RBC AUTO: 83 FL (ref 82–98)
MCV RBC AUTO: 84 FL (ref 82–98)
MCV RBC AUTO: 85 FL (ref 82–98)
MCV RBC AUTO: 86 FL (ref 82–98)
MCV RBC AUTO: 87 FL (ref 82–98)
MCV RBC AUTO: 88 FL (ref 82–98)
MCV RBC AUTO: 89 FL (ref 82–98)
MCV RBC AUTO: 90 FL (ref 82–98)
MCV RBC AUTO: 93 FL (ref 82–98)
METAMYELOCYTES NFR BLD MANUAL: 1 % (ref 0–1)
METAMYELOCYTES NFR BLD MANUAL: 2 % (ref 0–1)
METHYLMALONATE SERPL-SCNC: 487 NMOL/L (ref 0–378)
MICROALBUMIN UR-MCNC: 55.7 MG/L (ref 0–20)
MICROALBUMIN/CREAT 24H UR: 23 MG/G CREATININE (ref 0–30)
MICROCYTES BLD QL AUTO: PRESENT
MONOCYTES # BLD AUTO: 0.39 THOUSAND/UL (ref 0–1.22)
MONOCYTES # BLD AUTO: 0.56 THOUSAND/UL (ref 0–1.22)
MONOCYTES # BLD AUTO: 0.68 THOUSAND/ΜL (ref 0.17–1.22)
MONOCYTES # BLD AUTO: 0.78 THOUSAND/UL (ref 0–1.22)
MONOCYTES # BLD AUTO: 1.07 THOUSAND/UL (ref 0–1.22)
MONOCYTES # BLD AUTO: 1.09 THOUSAND/UL (ref 0–1.22)
MONOCYTES # BLD AUTO: 1.19 THOUSAND/UL (ref 0–1.22)
MONOCYTES # BLD AUTO: 1.26 THOUSAND/ΜL (ref 0.17–1.22)
MONOCYTES # BLD AUTO: 1.33 THOUSAND/UL (ref 0–1.22)
MONOCYTES # BLD AUTO: 1.39 THOUSAND/ΜL (ref 0.17–1.22)
MONOCYTES # BLD AUTO: 1.49 THOUSAND/ΜL (ref 0.17–1.22)
MONOCYTES # BLD AUTO: 1.53 THOUSAND/UL (ref 0–1.22)
MONOCYTES # BLD AUTO: 1.83 THOUSAND/UL (ref 0–1.22)
MONOCYTES # BLD AUTO: 2.01 THOUSAND/UL (ref 0–1.22)
MONOCYTES # BLD AUTO: 3.25 THOUSAND/UL (ref 0–1.22)
MONOCYTES NFR BLD AUTO: 10 % (ref 4–12)
MONOCYTES NFR BLD AUTO: 12 % (ref 4–12)
MONOCYTES NFR BLD AUTO: 9 % (ref 4–12)
MONOCYTES NFR BLD: 13 % (ref 4–12)
MONOCYTES NFR BLD: 13 % (ref 4–12)
MONOCYTES NFR BLD: 2 % (ref 4–12)
MONOCYTES NFR BLD: 4 % (ref 4–12)
MONOCYTES NFR BLD: 5 % (ref 4–12)
MONOCYTES NFR BLD: 6 % (ref 4–12)
MONOCYTES NFR BLD: 7 % (ref 4–12)
MONOCYTES NFR BLD: 7 % (ref 4–12)
MONOCYTES NFR BLD: 8 % (ref 4–12)
MONOCYTES NFR BLD: 8 % (ref 4–12)
MONOCYTES NFR BLD: 9 % (ref 4–12)
MRSA NOSE QL CULT: NORMAL
MYELOCYTES NFR BLD MANUAL: 1 % (ref 0–1)
MYELOCYTES NFR BLD MANUAL: 1 % (ref 0–1)
MYELOCYTES NFR BLD MANUAL: 2 % (ref 0–1)
MYELOCYTES NFR BLD MANUAL: 3 % (ref 0–1)
NEUTROPHILS # BLD AUTO: 3.81 THOUSANDS/ΜL (ref 1.85–7.62)
NEUTROPHILS # BLD AUTO: 6.73 THOUSANDS/ΜL (ref 1.85–7.62)
NEUTROPHILS # BLD AUTO: 7.42 THOUSANDS/ΜL (ref 1.85–7.62)
NEUTROPHILS # BLD AUTO: 8.26 THOUSANDS/ΜL (ref 1.85–7.62)
NEUTROPHILS # BLD MANUAL: 10.27 THOUSAND/UL (ref 1.85–7.62)
NEUTROPHILS # BLD MANUAL: 10.53 THOUSAND/UL (ref 1.85–7.62)
NEUTROPHILS # BLD MANUAL: 11.5 THOUSAND/UL (ref 1.85–7.62)
NEUTROPHILS # BLD MANUAL: 11.87 THOUSAND/UL (ref 1.85–7.62)
NEUTROPHILS # BLD MANUAL: 13.33 THOUSAND/UL (ref 1.85–7.62)
NEUTROPHILS # BLD MANUAL: 14.73 THOUSAND/UL (ref 1.85–7.62)
NEUTROPHILS # BLD MANUAL: 15.97 THOUSAND/UL (ref 1.85–7.62)
NEUTROPHILS # BLD MANUAL: 8.59 THOUSAND/UL (ref 1.85–7.62)
NEUTROPHILS # BLD MANUAL: 8.72 THOUSAND/UL (ref 1.85–7.62)
NEUTROPHILS # BLD MANUAL: 9.44 THOUSAND/UL (ref 1.85–7.62)
NEUTROPHILS # BLD MANUAL: 9.99 THOUSAND/UL (ref 1.85–7.62)
NEUTS BAND NFR BLD MANUAL: 1 % (ref 0–8)
NEUTS BAND NFR BLD MANUAL: 2 % (ref 0–8)
NEUTS BAND NFR BLD MANUAL: 3 % (ref 0–8)
NEUTS BAND NFR BLD MANUAL: 3 % (ref 0–8)
NEUTS BAND NFR BLD MANUAL: 5 % (ref 0–8)
NEUTS BAND NFR BLD MANUAL: 5 % (ref 0–8)
NEUTS HYPERSEG BLD QL SMEAR: PRESENT
NEUTS SEG NFR BLD AUTO: 43 % (ref 43–75)
NEUTS SEG NFR BLD AUTO: 50 % (ref 43–75)
NEUTS SEG NFR BLD AUTO: 51 % (ref 43–75)
NEUTS SEG NFR BLD AUTO: 52 % (ref 43–75)
NEUTS SEG NFR BLD AUTO: 53 % (ref 43–75)
NEUTS SEG NFR BLD AUTO: 53 % (ref 43–75)
NEUTS SEG NFR BLD AUTO: 54 % (ref 43–75)
NEUTS SEG NFR BLD AUTO: 54 % (ref 43–75)
NEUTS SEG NFR BLD AUTO: 60 % (ref 43–75)
NEUTS SEG NFR BLD AUTO: 67 % (ref 43–75)
NEUTS SEG NFR BLD AUTO: 69 % (ref 43–75)
NEUTS SEG NFR BLD AUTO: 73 % (ref 43–75)
NEUTS SEG NFR BLD AUTO: 76 % (ref 43–75)
NEUTS SEG NFR BLD AUTO: 82 % (ref 43–75)
NITRITE UR QL STRIP: NEGATIVE
NITRITE UR QL STRIP: NEGATIVE
NITRITE UR QL STRIP: POSITIVE
NITRITE UR QL STRIP: POSITIVE
NON-SQ EPI CELLS URNS QL MICRO: ABNORMAL /HPF
NONHDLC SERPL-MCNC: 88 MG/DL
NRBC BLD AUTO-RTO: 0 /100 WBCS
NRBC BLD AUTO-RTO: 18 /100 WBC (ref 0–2)
NRBC BLD AUTO-RTO: 18 /100 WBCS
NRBC BLD AUTO-RTO: 20 /100 WBC (ref 0–2)
NRBC BLD AUTO-RTO: 20 /100 WBCS
NRBC BLD AUTO-RTO: 20 /100 WBCS
NRBC BLD AUTO-RTO: 22 /100 WBC (ref 0–2)
NRBC BLD AUTO-RTO: 22 /100 WBC (ref 0–2)
NRBC BLD AUTO-RTO: 22 /100 WBCS
NRBC BLD AUTO-RTO: 24 /100 WBCS
NRBC BLD AUTO-RTO: 25 /100 WBCS
NRBC BLD AUTO-RTO: 26 /100 WBCS
NRBC BLD AUTO-RTO: 27 /100 WBCS
NRBC BLD AUTO-RTO: 28 /100 WBCS
NRBC BLD AUTO-RTO: 28 /100 WBCS
NRBC BLD AUTO-RTO: 29 /100 WBC (ref 0–2)
NRBC BLD AUTO-RTO: 29 /100 WBCS
NRBC BLD AUTO-RTO: 30 /100 WBCS
NRBC BLD AUTO-RTO: 30 /100 WBCS
NRBC BLD AUTO-RTO: 31 /100 WBCS
NRBC BLD AUTO-RTO: 32 /100 WBCS
NRBC BLD AUTO-RTO: 33 /100 WBCS
NRBC BLD AUTO-RTO: 34 /100 WBC (ref 0–2)
NRBC BLD AUTO-RTO: 34 /100 WBCS
NRBC BLD AUTO-RTO: 37 /100 WBC (ref 0–2)
NRBC BLD AUTO-RTO: 38 /100 WBCS
NRBC BLD AUTO-RTO: 39 /100 WBCS
NRBC BLD AUTO-RTO: 40 /100 WBCS
NRBC BLD AUTO-RTO: 40 /100 WBCS
NRBC BLD AUTO-RTO: 44 /100 WBCS
NRBC BLD AUTO-RTO: 48 /100 WBC (ref 0–2)
NRBC BLD AUTO-RTO: 56 /100 WBC (ref 0–2)
O2 CT BLDA-SCNC: 10.8 ML/DL (ref 16–23)
O2 CT BLDA-SCNC: 10.9 ML/DL (ref 16–23)
O2 CT BLDA-SCNC: 11.4 ML/DL (ref 16–23)
O2 CT BLDA-SCNC: 8.1 ML/DL (ref 16–23)
OTHER STN SPEC: ABNORMAL
OXYHGB MFR BLDA: 94.5 % (ref 94–97)
OXYHGB MFR BLDA: 96.8 % (ref 94–97)
OXYHGB MFR BLDA: 96.9 % (ref 94–97)
OXYHGB MFR BLDA: 98.2 % (ref 94–97)
P AXIS: 45 DEGREES
P AXIS: 49 DEGREES
P AXIS: 49 DEGREES
PBG UR-MCNC: 0.5 MG/L (ref 0–2)
PCO2 BLD: 19.4 MM HG (ref 36–44)
PCO2 BLD: 5 MMOL/L (ref 21–32)
PCO2 BLD: <17 MM HG (ref 36–44)
PCO2 BLDA: 22.9 MM HG (ref 36–44)
PCO2 BLDA: 31.6 MM HG (ref 36–44)
PCO2 BLDA: 31.8 MM HG (ref 36–44)
PCO2 BLDA: 38.8 MM HG (ref 36–44)
PEEP RESPIRATORY: 5 CM[H2O]
PF4 HEPARIN CMPLX AB SER-ACNC: 0.1 OD (ref 0–0.4)
PH BLD: 6.99 [PH] (ref 7.35–7.45)
PH BLD: 7 [PH] (ref 7.35–7.45)
PH BLDA: 6.92 [PH] (ref 7.35–7.45)
PH BLDA: 7.47 [PH] (ref 7.35–7.45)
PH BLDA: 7.58 [PH] (ref 7.35–7.45)
PH BLDA: 7.62 [PH] (ref 7.35–7.45)
PH UR STRIP.AUTO: 6 [PH] (ref 4.5–8)
PH UR STRIP.AUTO: 7 [PH]
PH UR STRIP.AUTO: 8.5 [PH]
PH UR STRIP.AUTO: 8.5 [PH]
PHOSPHATE SERPL-MCNC: 13.1 MG/DL (ref 2.7–4.5)
PHOSPHATE SERPL-MCNC: 3.8 MG/DL (ref 2.7–4.5)
PHOSPHATE SERPL-MCNC: 4.2 MG/DL (ref 2.7–4.5)
PHOSPHATE SERPL-MCNC: 4.4 MG/DL (ref 2.7–4.5)
PHOSPHATE SERPL-MCNC: 4.9 MG/DL (ref 2.7–4.5)
PHOSPHATE SERPL-MCNC: 5 MG/DL (ref 2.7–4.5)
PHOSPHATE SERPL-MCNC: 5.5 MG/DL (ref 2.7–4.5)
PHOSPHATE SERPL-MCNC: 5.9 MG/DL (ref 2.7–4.5)
PHOSPHATE SERPL-MCNC: 6 MG/DL (ref 2.7–4.5)
PLASMA CELLS NFR BLD: 1 % (ref 0–0)
PLATELET # BLD AUTO: 100 THOUSANDS/UL (ref 149–390)
PLATELET # BLD AUTO: 105 THOUSANDS/UL (ref 149–390)
PLATELET # BLD AUTO: 108 THOUSANDS/UL (ref 149–390)
PLATELET # BLD AUTO: 116 THOUSANDS/UL (ref 149–390)
PLATELET # BLD AUTO: 116 THOUSANDS/UL (ref 149–390)
PLATELET # BLD AUTO: 119 THOUSANDS/UL (ref 149–390)
PLATELET # BLD AUTO: 16 THOUSANDS/UL (ref 149–390)
PLATELET # BLD AUTO: 20 THOUSANDS/UL (ref 149–390)
PLATELET # BLD AUTO: 20 THOUSANDS/UL (ref 149–390)
PLATELET # BLD AUTO: 21 THOUSANDS/UL (ref 149–390)
PLATELET # BLD AUTO: 21 THOUSANDS/UL (ref 149–390)
PLATELET # BLD AUTO: 24 THOUSANDS/UL (ref 149–390)
PLATELET # BLD AUTO: 24 THOUSANDS/UL (ref 149–390)
PLATELET # BLD AUTO: 25 THOUSANDS/UL (ref 149–390)
PLATELET # BLD AUTO: 26 THOUSANDS/UL (ref 149–390)
PLATELET # BLD AUTO: 28 THOUSANDS/UL (ref 149–390)
PLATELET # BLD AUTO: 29 THOUSANDS/UL (ref 149–390)
PLATELET # BLD AUTO: 295 THOUSANDS/UL (ref 149–390)
PLATELET # BLD AUTO: 31 THOUSANDS/UL (ref 149–390)
PLATELET # BLD AUTO: 33 THOUSANDS/UL (ref 149–390)
PLATELET # BLD AUTO: 33 THOUSANDS/UL (ref 149–390)
PLATELET # BLD AUTO: 34 THOUSANDS/UL (ref 149–390)
PLATELET # BLD AUTO: 37 THOUSANDS/UL (ref 149–390)
PLATELET # BLD AUTO: 40 THOUSANDS/UL (ref 149–390)
PLATELET # BLD AUTO: 41 THOUSANDS/UL (ref 149–390)
PLATELET # BLD AUTO: 46 THOUSANDS/UL (ref 149–390)
PLATELET # BLD AUTO: 49 THOUSANDS/UL (ref 149–390)
PLATELET # BLD AUTO: 51 THOUSANDS/UL (ref 149–390)
PLATELET # BLD AUTO: 51 THOUSANDS/UL (ref 149–390)
PLATELET # BLD AUTO: 59 THOUSANDS/UL (ref 149–390)
PLATELET # BLD AUTO: 65 THOUSANDS/UL (ref 149–390)
PLATELET # BLD AUTO: 74 THOUSANDS/UL (ref 149–390)
PLATELET # BLD AUTO: 75 THOUSANDS/UL (ref 149–390)
PLATELET # BLD AUTO: 76 THOUSANDS/UL (ref 149–390)
PLATELET # BLD AUTO: 83 THOUSANDS/UL (ref 149–390)
PLATELET # BLD AUTO: 85 THOUSANDS/UL (ref 149–390)
PLATELET # BLD AUTO: 92 THOUSANDS/UL (ref 149–390)
PLATELET BLD QL SMEAR: ABNORMAL
PMV BLD AUTO: 10.4 FL (ref 8.9–12.7)
PO2 BLD: 85 MM HG (ref 75–129)
PO2 BLD: >400 MM HG (ref 75–129)
PO2 BLDA: 145.8 MM HG (ref 75–129)
PO2 BLDA: 345.9 MM HG (ref 75–129)
PO2 BLDA: 439.4 MM HG (ref 75–129)
PO2 BLDA: 444.8 MM HG (ref 75–129)
POIKILOCYTOSIS BLD QL SMEAR: PRESENT
POLYCHROMASIA BLD QL SMEAR: PRESENT
POTASSIUM BLD-SCNC: 5.1 MMOL/L (ref 3.5–5.3)
POTASSIUM BLD-SCNC: 5.2 MMOL/L (ref 3.5–5.3)
POTASSIUM SERPL-SCNC: 3 MMOL/L (ref 3.5–5.3)
POTASSIUM SERPL-SCNC: 3.1 MMOL/L (ref 3.5–5.3)
POTASSIUM SERPL-SCNC: 3.5 MMOL/L (ref 3.5–5.3)
POTASSIUM SERPL-SCNC: 3.6 MMOL/L (ref 3.5–5.3)
POTASSIUM SERPL-SCNC: 3.7 MMOL/L (ref 3.5–5.3)
POTASSIUM SERPL-SCNC: 3.8 MMOL/L (ref 3.5–5.3)
POTASSIUM SERPL-SCNC: 3.9 MMOL/L (ref 3.5–5.3)
POTASSIUM SERPL-SCNC: 4 MMOL/L (ref 3.5–5.3)
POTASSIUM SERPL-SCNC: 4.1 MMOL/L (ref 3.5–5.3)
POTASSIUM SERPL-SCNC: 4.2 MMOL/L (ref 3.5–5.3)
POTASSIUM SERPL-SCNC: 4.3 MMOL/L (ref 3.5–5.3)
POTASSIUM SERPL-SCNC: 4.3 MMOL/L (ref 3.5–5.3)
POTASSIUM SERPL-SCNC: 4.6 MMOL/L (ref 3.5–5.3)
POTASSIUM SERPL-SCNC: 4.6 MMOL/L (ref 3.5–5.3)
POTASSIUM SERPL-SCNC: 4.8 MMOL/L (ref 3.5–5.3)
POTASSIUM SERPL-SCNC: 5 MMOL/L (ref 3.5–5.3)
POTASSIUM SERPL-SCNC: 5.2 MMOL/L (ref 3.5–5.3)
PR INTERVAL: 129 MS
PR INTERVAL: 138 MS
PR INTERVAL: 144 MS
PREALB SERPL-MCNC: 26 MG/DL (ref 18–40)
PROCALCITONIN SERPL-MCNC: 1.74 NG/ML
PROCALCITONIN SERPL-MCNC: 2.01 NG/ML
PROCALCITONIN SERPL-MCNC: 2.01 NG/ML
PROCALCITONIN SERPL-MCNC: 2.88 NG/ML
PROLACTIN SERPL-MCNC: 138.9 NG/ML
PROT PATTERN SERPL ELPH-IMP: ABNORMAL
PROT PATTERN UR ELPH-IMP: ABNORMAL
PROT SERPL-MCNC: 4.5 G/DL (ref 6.4–8.2)
PROT SERPL-MCNC: 5.1 G/DL (ref 6.4–8.2)
PROT SERPL-MCNC: 5.2 G/DL (ref 6.4–8.2)
PROT SERPL-MCNC: 5.3 G/DL (ref 6.4–8.2)
PROT SERPL-MCNC: 5.5 G/DL (ref 6.4–8.2)
PROT SERPL-MCNC: 5.6 G/DL (ref 6.4–8.2)
PROT SERPL-MCNC: 5.6 G/DL (ref 6.4–8.2)
PROT SERPL-MCNC: 5.7 G/DL (ref 6.4–8.2)
PROT SERPL-MCNC: 5.8 G/DL (ref 6.4–8.2)
PROT SERPL-MCNC: 5.9 G/DL (ref 6.4–8.2)
PROT SERPL-MCNC: 6 G/DL (ref 6.4–8.2)
PROT SERPL-MCNC: 6 G/DL (ref 6.4–8.2)
PROT SERPL-MCNC: 6.1 G/DL (ref 6.4–8.2)
PROT SERPL-MCNC: 6.3 G/DL (ref 6.4–8.2)
PROT SERPL-MCNC: 6.4 G/DL (ref 6.4–8.2)
PROT SERPL-MCNC: 6.9 G/DL (ref 6.4–8.2)
PROT SERPL-MCNC: 7.1 G/DL (ref 6.4–8.2)
PROT SERPL-MCNC: 7.1 G/DL (ref 6.4–8.2)
PROT SERPL-MCNC: 7.9 G/DL (ref 6.4–8.2)
PROT SERPL-MCNC: 8 G/DL (ref 6.4–8.2)
PROT UR STRIP-MCNC: >=300 MG/DL
PROT UR STRIP-MCNC: ABNORMAL MG/DL
PROT UR-MCNC: 155 MG/DL
PROTHROMBIN TIME: 14.7 SECONDS (ref 11.6–14.5)
PROTHROMBIN TIME: 14.9 SECONDS (ref 11.6–14.5)
PROTHROMBIN TIME: 15 SECONDS (ref 11.6–14.5)
PROTHROMBIN TIME: 15.1 SECONDS (ref 11.6–14.5)
PROTHROMBIN TIME: 15.2 SECONDS (ref 11.6–14.5)
PROTHROMBIN TIME: 15.4 SECONDS (ref 11.6–14.5)
PROTHROMBIN TIME: 15.5 SECONDS (ref 11.6–14.5)
PROTHROMBIN TIME: 15.7 SECONDS (ref 11.6–14.5)
PROTHROMBIN TIME: 16 SECONDS (ref 11.6–14.5)
PROTHROMBIN TIME: 16.1 SECONDS (ref 11.6–14.5)
PROTHROMBIN TIME: 16.4 SECONDS (ref 11.6–14.5)
PROTHROMBIN TIME: 27.5 SECONDS (ref 11.6–14.5)
QRS AXIS: 53 DEGREES
QRS AXIS: 55 DEGREES
QRS AXIS: 73 DEGREES
QRSD INTERVAL: 100 MS
QRSD INTERVAL: 80 MS
QRSD INTERVAL: 83 MS
QT INTERVAL: 288 MS
QT INTERVAL: 370 MS
QT INTERVAL: 375 MS
QTC INTERVAL: 417 MS
QTC INTERVAL: 452 MS
QTC INTERVAL: 503 MS
RBC # BLD AUTO: 1.8 MILLION/UL (ref 3.81–5.12)
RBC # BLD AUTO: 1.98 MILLION/UL (ref 3.81–5.12)
RBC # BLD AUTO: 2.16 MILLION/UL (ref 3.81–5.12)
RBC # BLD AUTO: 2.31 MILLION/UL (ref 3.81–5.12)
RBC # BLD AUTO: 2.32 MILLION/UL (ref 3.81–5.12)
RBC # BLD AUTO: 2.34 MILLION/UL (ref 3.81–5.12)
RBC # BLD AUTO: 2.36 MILLION/UL (ref 3.81–5.12)
RBC # BLD AUTO: 2.43 MILLION/UL (ref 3.81–5.12)
RBC # BLD AUTO: 2.46 MILLION/UL (ref 3.81–5.12)
RBC # BLD AUTO: 2.47 MILLION/UL (ref 3.81–5.12)
RBC # BLD AUTO: 2.51 MILLION/UL (ref 3.81–5.12)
RBC # BLD AUTO: 2.51 MILLION/UL (ref 3.81–5.12)
RBC # BLD AUTO: 2.52 MILLION/UL (ref 3.81–5.12)
RBC # BLD AUTO: 2.55 MILLION/UL (ref 3.81–5.12)
RBC # BLD AUTO: 2.55 MILLION/UL (ref 3.81–5.12)
RBC # BLD AUTO: 2.56 MILLION/UL (ref 3.81–5.12)
RBC # BLD AUTO: 2.57 MILLION/UL (ref 3.81–5.12)
RBC # BLD AUTO: 2.57 MILLION/UL (ref 3.81–5.12)
RBC # BLD AUTO: 2.58 MILLION/UL (ref 3.81–5.12)
RBC # BLD AUTO: 2.6 MILLION/UL (ref 3.81–5.12)
RBC # BLD AUTO: 2.64 MILLION/UL (ref 3.81–5.12)
RBC # BLD AUTO: 2.64 MILLION/UL (ref 3.81–5.12)
RBC # BLD AUTO: 2.65 MILLION/UL (ref 3.81–5.12)
RBC # BLD AUTO: 2.66 MILLION/UL (ref 3.81–5.12)
RBC # BLD AUTO: 2.67 MILLION/UL (ref 3.81–5.12)
RBC # BLD AUTO: 2.74 MILLION/UL (ref 3.81–5.12)
RBC # BLD AUTO: 2.75 MILLION/UL (ref 3.81–5.12)
RBC # BLD AUTO: 2.81 MILLION/UL (ref 3.81–5.12)
RBC # BLD AUTO: 2.82 MILLION/UL (ref 3.81–5.12)
RBC # BLD AUTO: 2.83 MILLION/UL (ref 3.81–5.12)
RBC # BLD AUTO: 2.84 MILLION/UL (ref 3.81–5.12)
RBC # BLD AUTO: 2.84 MILLION/UL (ref 3.81–5.12)
RBC # BLD AUTO: 2.91 MILLION/UL (ref 3.81–5.12)
RBC # BLD AUTO: 2.97 MILLION/UL (ref 3.81–5.12)
RBC # BLD AUTO: 3.1 MILLION/UL (ref 3.81–5.12)
RBC # BLD AUTO: 3.15 MILLION/UL (ref 3.81–5.12)
RBC # BLD AUTO: 3.23 MILLION/UL (ref 3.81–5.12)
RBC # BLD AUTO: 3.48 MILLION/UL (ref 3.81–5.12)
RBC # BLD AUTO: 4.08 MILLION/UL (ref 3.81–5.12)
RBC #/AREA URNS AUTO: ABNORMAL /HPF
RBC MORPH BLD: PRESENT
RETICS # AUTO: ABNORMAL 10*3/UL (ref 14097–95744)
RETICS # CALC: 5.02 % (ref 0.37–1.87)
RETICS # CALC: 5.34 % (ref 0.37–1.87)
RETICS # CALC: 5.43 % (ref 0.37–1.87)
RETICS # CALC: 5.6 % (ref 0.37–1.87)
RETICS # CALC: 6.39 % (ref 0.37–1.87)
RETICS # CALC: 6.99 % (ref 0.37–1.87)
RH BLD: POSITIVE
RIGHT EYE DIABETIC RETINOPATHY: ABNORMAL
RIGHT EYE IMAGE QUALITY: ABNORMAL
RIGHT EYE MACULAR EDEMA: ABNORMAL
RIGHT EYE OTHER RETINOPATHY: ABNORMAL
RYE IGE QN: NEGATIVE
RYE IGE QN: NEGATIVE
SARS-COV-2 RNA RESP QL NAA+PROBE: NEGATIVE
SARS-COV-2 RNA RESP QL NAA+PROBE: NEGATIVE
SCAN RESULT: NORMAL
SCHISTOCYTES BLD QL SMEAR: PRESENT
SEVERITY (EYE EXAM): ABNORMAL
SL AMB  POCT GLUCOSE, UA: NEGATIVE
SL AMB DISCLAIMER: ABNORMAL
SL AMB LEUKOCYTE ESTERASE,UA: ABNORMAL
SL AMB POCT BILIRUBIN,UA: NEGATIVE
SL AMB POCT BLOOD,UA: ABNORMAL
SL AMB POCT CLARITY,UA: CLEAR
SL AMB POCT COLOR,UA: YELLOW
SL AMB POCT HEMOGLOBIN AIC: 6.5 (ref ?–6.5)
SL AMB POCT KETONES,UA: NEGATIVE
SL AMB POCT NITRITE,UA: NEGATIVE
SL AMB POCT PH,UA: 5
SL AMB POCT SPECIFIC GRAVITY,UA: 1.02
SL AMB POCT URINE PROTEIN: ABNORMAL
SL AMB POCT UROBILINOGEN: 0.2
SMUDGE CELLS BLD QL SMEAR: PRESENT
SMUDGE CELLS BLD QL SMEAR: PRESENT
SODIUM BLD-SCNC: 138 MMOL/L (ref 136–145)
SODIUM BLD-SCNC: 142 MMOL/L (ref 136–145)
SODIUM SERPL-SCNC: 136 MMOL/L (ref 136–145)
SODIUM SERPL-SCNC: 137 MMOL/L (ref 136–145)
SODIUM SERPL-SCNC: 138 MMOL/L (ref 136–145)
SODIUM SERPL-SCNC: 139 MMOL/L (ref 136–145)
SODIUM SERPL-SCNC: 140 MMOL/L (ref 136–145)
SODIUM SERPL-SCNC: 141 MMOL/L (ref 136–145)
SODIUM SERPL-SCNC: 142 MMOL/L (ref 136–145)
SODIUM SERPL-SCNC: 142 MMOL/L (ref 136–145)
SODIUM SERPL-SCNC: 143 MMOL/L (ref 136–145)
SODIUM SERPL-SCNC: 143 MMOL/L (ref 136–145)
SODIUM SERPL-SCNC: 144 MMOL/L (ref 136–145)
SODIUM SERPL-SCNC: 144 MMOL/L (ref 136–145)
SODIUM SERPL-SCNC: 145 MMOL/L (ref 136–145)
SODIUM SERPL-SCNC: 146 MMOL/L (ref 136–145)
SODIUM SERPL-SCNC: 147 MMOL/L (ref 136–145)
SP GR UR STRIP.AUTO: 1.01 (ref 1–1.03)
SP GR UR STRIP.AUTO: 1.01 (ref 1–1.03)
SP GR UR STRIP.AUTO: 1.03 (ref 1–1.03)
SP GR UR STRIP.AUTO: <=1.005 (ref 1–1.03)
SPECIMEN EXPIRATION DATE: NORMAL
SPECIMEN SOURCE: ABNORMAL
SRA .2 IU/ML UFH SER-ACNC: <1 % (ref 0–20)
SRA 100IU/ML UFH SER-ACNC: <1 % (ref 0–20)
SRA UFH SER-IMP: NORMAL
T WAVE AXIS: 59 DEGREES
T WAVE AXIS: 6 DEGREES
T WAVE AXIS: 65 DEGREES
THYROPEROXIDASE AB SERPL-ACNC: <9 IU/ML (ref 0–34)
TIBC SERPL-MCNC: 224 UG/DL (ref 250–450)
TIBC SERPL-MCNC: 236 UG/DL (ref 250–450)
TIBC SERPL-MCNC: 274 UG/DL (ref 250–450)
TOTAL CELLS COUNTED SPEC: 100
TRIGL SERPL-MCNC: 125 MG/DL
TROPONIN I SERPL-MCNC: 0.02 NG/ML
TROPONIN I SERPL-MCNC: 0.58 NG/ML
TSH SERPL DL<=0.05 MIU/L-ACNC: 1.74 UIU/ML (ref 0.36–3.74)
TSH SERPL DL<=0.05 MIU/L-ACNC: 3.67 UIU/ML (ref 0.36–3.74)
UNIT DISPENSE STATUS: NORMAL
UNIT PRODUCT CODE: NORMAL
UNIT RH: NORMAL
UROBILINOGEN UR QL STRIP.AUTO: 0.2 E.U./DL
UROBILINOGEN UR QL STRIP.AUTO: 0.2 E.U./DL
UROBILINOGEN UR QL STRIP.AUTO: 4 E.U./DL
UROBILINOGEN UR QL STRIP.AUTO: 4 E.U./DL
VANCOMYCIN SERPL-MCNC: 12.4 UG/ML
VANCOMYCIN SERPL-MCNC: 22 UG/ML
VARIANT LYMPHS # BLD AUTO: 1 %
VARIANT LYMPHS # BLD AUTO: 1 %
VARIANT LYMPHS # BLD AUTO: 2 %
VARIANT LYMPHS # BLD AUTO: 4 %
VARIANT LYMPHS # BLD AUTO: 5 %
VENT AC: 12
VENT AC: 18
VENT AC: 24
VENT- AC: AC
VENTRICULAR RATE: 108 BPM
VENTRICULAR RATE: 126 BPM
VENTRICULAR RATE: 90 BPM
VIT B12 SERPL-MCNC: 1201 PG/ML (ref 100–900)
VT SETTING VENT: 400 ML
VT SETTING VENT: 450 ML
VT SETTING VENT: 450 ML
VWF CP ACT/NOR PPP CHRO: 46.9 %
VWF CP ACT/NOR PPP CHRO: 63.2 %
VWF CP ACT/NOR PPP CHRO: NORMAL %
VWF CP ACT/NOR PPP CHRO: NORMAL %
WBC # BLD AUTO: 12.74 THOUSAND/UL (ref 4.31–10.16)
WBC # BLD AUTO: 13.08 THOUSAND/UL (ref 4.31–10.16)
WBC # BLD AUTO: 13.52 THOUSAND/UL (ref 4.31–10.16)
WBC # BLD AUTO: 13.88 THOUSAND/UL (ref 4.31–10.16)
WBC # BLD AUTO: 14.14 THOUSAND/UL (ref 4.31–10.16)
WBC # BLD AUTO: 14.28 THOUSAND/UL (ref 4.31–10.16)
WBC # BLD AUTO: 14.62 THOUSAND/UL (ref 4.31–10.16)
WBC # BLD AUTO: 14.78 THOUSAND/UL (ref 4.31–10.16)
WBC # BLD AUTO: 15.3 THOUSAND/UL (ref 4.31–10.16)
WBC # BLD AUTO: 15.43 THOUSAND/UL (ref 4.31–10.16)
WBC # BLD AUTO: 15.47 THOUSAND/UL (ref 4.31–10.16)
WBC # BLD AUTO: 15.52 THOUSAND/UL (ref 4.31–10.16)
WBC # BLD AUTO: 15.61 THOUSAND/UL (ref 4.31–10.16)
WBC # BLD AUTO: 15.62 THOUSAND/UL (ref 4.31–10.16)
WBC # BLD AUTO: 15.75 THOUSAND/UL (ref 4.31–10.16)
WBC # BLD AUTO: 15.99 THOUSAND/UL (ref 4.31–10.16)
WBC # BLD AUTO: 16.07 THOUSAND/UL (ref 4.31–10.16)
WBC # BLD AUTO: 16.32 THOUSAND/UL (ref 4.31–10.16)
WBC # BLD AUTO: 16.5 THOUSAND/UL (ref 4.31–10.16)
WBC # BLD AUTO: 16.58 THOUSAND/UL (ref 4.31–10.16)
WBC # BLD AUTO: 16.59 THOUSAND/UL (ref 4.31–10.16)
WBC # BLD AUTO: 16.64 THOUSAND/UL (ref 4.31–10.16)
WBC # BLD AUTO: 16.78 THOUSAND/UL (ref 4.31–10.16)
WBC # BLD AUTO: 16.93 THOUSAND/UL (ref 4.31–10.16)
WBC # BLD AUTO: 17.6 THOUSAND/UL (ref 4.31–10.16)
WBC # BLD AUTO: 17.62 THOUSAND/UL (ref 4.31–10.16)
WBC # BLD AUTO: 17.75 THOUSAND/UL (ref 4.31–10.16)
WBC # BLD AUTO: 18.71 THOUSAND/UL (ref 4.31–10.16)
WBC # BLD AUTO: 19.15 THOUSAND/UL (ref 4.31–10.16)
WBC # BLD AUTO: 19.3 THOUSAND/UL (ref 4.31–10.16)
WBC # BLD AUTO: 19.33 THOUSAND/UL (ref 4.31–10.16)
WBC # BLD AUTO: 19.44 THOUSAND/UL (ref 4.31–10.16)
WBC # BLD AUTO: 19.61 THOUSAND/UL (ref 4.31–10.16)
WBC # BLD AUTO: 20.12 THOUSAND/UL (ref 4.31–10.16)
WBC # BLD AUTO: 20.19 THOUSAND/UL (ref 4.31–10.16)
WBC # BLD AUTO: 20.59 THOUSAND/UL (ref 4.31–10.16)
WBC # BLD AUTO: 22.82 THOUSAND/UL (ref 4.31–10.16)
WBC # BLD AUTO: 25 THOUSAND/UL (ref 4.31–10.16)
WBC # BLD AUTO: 7.29 THOUSAND/UL (ref 4.31–10.16)
WBC #/AREA URNS AUTO: ABNORMAL /HPF

## 2020-01-01 PROCEDURE — 80053 COMPREHEN METABOLIC PANEL: CPT | Performed by: STUDENT IN AN ORGANIZED HEALTH CARE EDUCATION/TRAINING PROGRAM

## 2020-01-01 PROCEDURE — 07DR3ZX EXTRACTION OF ILIAC BONE MARROW, PERCUTANEOUS APPROACH, DIAGNOSTIC: ICD-10-PCS | Performed by: RADIOLOGY

## 2020-01-01 PROCEDURE — 80053 COMPREHEN METABOLIC PANEL: CPT | Performed by: INTERNAL MEDICINE

## 2020-01-01 PROCEDURE — 70450 CT HEAD/BRAIN W/O DYE: CPT

## 2020-01-01 PROCEDURE — 99292 CRITICAL CARE ADDL 30 MIN: CPT | Performed by: ANESTHESIOLOGY

## 2020-01-01 PROCEDURE — 82553 CREATINE MB FRACTION: CPT | Performed by: PHYSICIAN ASSISTANT

## 2020-01-01 PROCEDURE — 76700 US EXAM ABDOM COMPLETE: CPT

## 2020-01-01 PROCEDURE — 94003 VENT MGMT INPAT SUBQ DAY: CPT

## 2020-01-01 PROCEDURE — 85027 COMPLETE CBC AUTOMATED: CPT | Performed by: FAMILY MEDICINE

## 2020-01-01 PROCEDURE — 70551 MRI BRAIN STEM W/O DYE: CPT

## 2020-01-01 PROCEDURE — 83615 LACTATE (LD) (LDH) ENZYME: CPT | Performed by: STUDENT IN AN ORGANIZED HEALTH CARE EDUCATION/TRAINING PROGRAM

## 2020-01-01 PROCEDURE — 82553 CREATINE MB FRACTION: CPT | Performed by: FAMILY MEDICINE

## 2020-01-01 PROCEDURE — 99233 SBSQ HOSP IP/OBS HIGH 50: CPT | Performed by: FAMILY MEDICINE

## 2020-01-01 PROCEDURE — 86920 COMPATIBILITY TEST SPIN: CPT

## 2020-01-01 PROCEDURE — 82330 ASSAY OF CALCIUM: CPT | Performed by: STUDENT IN AN ORGANIZED HEALTH CARE EDUCATION/TRAINING PROGRAM

## 2020-01-01 PROCEDURE — P9017 PLASMA 1 DONOR FRZ W/IN 8 HR: HCPCS

## 2020-01-01 PROCEDURE — 81001 URINALYSIS AUTO W/SCOPE: CPT | Performed by: INTERNAL MEDICINE

## 2020-01-01 PROCEDURE — 71045 X-RAY EXAM CHEST 1 VIEW: CPT

## 2020-01-01 PROCEDURE — P9016 RBC LEUKOCYTES REDUCED: HCPCS

## 2020-01-01 PROCEDURE — 86880 COOMBS TEST DIRECT: CPT | Performed by: PHYSICIAN ASSISTANT

## 2020-01-01 PROCEDURE — 82948 REAGENT STRIP/BLOOD GLUCOSE: CPT

## 2020-01-01 PROCEDURE — 5A1945Z RESPIRATORY VENTILATION, 24-96 CONSECUTIVE HOURS: ICD-10-PCS | Performed by: EMERGENCY MEDICINE

## 2020-01-01 PROCEDURE — 99232 SBSQ HOSP IP/OBS MODERATE 35: CPT | Performed by: INTERNAL MEDICINE

## 2020-01-01 PROCEDURE — 83615 LACTATE (LD) (LDH) ENZYME: CPT | Performed by: INTERNAL MEDICINE

## 2020-01-01 PROCEDURE — 96376 TX/PRO/DX INJ SAME DRUG ADON: CPT

## 2020-01-01 PROCEDURE — 85018 HEMOGLOBIN: CPT | Performed by: FAMILY MEDICINE

## 2020-01-01 PROCEDURE — 86335 IMMUNFIX E-PHORSIS/URINE/CSF: CPT | Performed by: PATHOLOGY

## 2020-01-01 PROCEDURE — 30233K1 TRANSFUSION OF NONAUTOLOGOUS FROZEN PLASMA INTO PERIPHERAL VEIN, PERCUTANEOUS APPROACH: ICD-10-PCS | Performed by: STUDENT IN AN ORGANIZED HEALTH CARE EDUCATION/TRAINING PROGRAM

## 2020-01-01 PROCEDURE — 85730 THROMBOPLASTIN TIME PARTIAL: CPT | Performed by: INTERNAL MEDICINE

## 2020-01-01 PROCEDURE — 99232 SBSQ HOSP IP/OBS MODERATE 35: CPT | Performed by: FAMILY MEDICINE

## 2020-01-01 PROCEDURE — 73060 X-RAY EXAM OF HUMERUS: CPT

## 2020-01-01 PROCEDURE — 86901 BLOOD TYPING SEROLOGIC RH(D): CPT | Performed by: FAMILY MEDICINE

## 2020-01-01 PROCEDURE — 93010 ELECTROCARDIOGRAM REPORT: CPT | Performed by: INTERNAL MEDICINE

## 2020-01-01 PROCEDURE — 85384 FIBRINOGEN ACTIVITY: CPT | Performed by: STUDENT IN AN ORGANIZED HEALTH CARE EDUCATION/TRAINING PROGRAM

## 2020-01-01 PROCEDURE — 83735 ASSAY OF MAGNESIUM: CPT | Performed by: ANESTHESIOLOGY

## 2020-01-01 PROCEDURE — 94002 VENT MGMT INPAT INIT DAY: CPT

## 2020-01-01 PROCEDURE — 86880 COOMBS TEST DIRECT: CPT | Performed by: STUDENT IN AN ORGANIZED HEALTH CARE EDUCATION/TRAINING PROGRAM

## 2020-01-01 PROCEDURE — NC001 PR NO CHARGE: Performed by: INTERNAL MEDICINE

## 2020-01-01 PROCEDURE — NC001 PR NO CHARGE: Performed by: ANESTHESIOLOGY

## 2020-01-01 PROCEDURE — 85007 BL SMEAR W/DIFF WBC COUNT: CPT | Performed by: INTERNAL MEDICINE

## 2020-01-01 PROCEDURE — 92610 EVALUATE SWALLOWING FUNCTION: CPT

## 2020-01-01 PROCEDURE — C1751 CATH, INF, PER/CENT/MIDLINE: HCPCS

## 2020-01-01 PROCEDURE — 99232 SBSQ HOSP IP/OBS MODERATE 35: CPT | Performed by: STUDENT IN AN ORGANIZED HEALTH CARE EDUCATION/TRAINING PROGRAM

## 2020-01-01 PROCEDURE — 93306 TTE W/DOPPLER COMPLETE: CPT

## 2020-01-01 PROCEDURE — C9047 INJECTION, CAPLACIZUMAB-YHDP: HCPCS | Performed by: INTERNAL MEDICINE

## 2020-01-01 PROCEDURE — 83735 ASSAY OF MAGNESIUM: CPT | Performed by: EMERGENCY MEDICINE

## 2020-01-01 PROCEDURE — 99233 SBSQ HOSP IP/OBS HIGH 50: CPT | Performed by: PSYCHIATRY & NEUROLOGY

## 2020-01-01 PROCEDURE — 85379 FIBRIN DEGRADATION QUANT: CPT | Performed by: INTERNAL MEDICINE

## 2020-01-01 PROCEDURE — 0BH18EZ INSERTION OF ENDOTRACHEAL AIRWAY INTO TRACHEA, VIA NATURAL OR ARTIFICIAL OPENING ENDOSCOPIC: ICD-10-PCS | Performed by: STUDENT IN AN ORGANIZED HEALTH CARE EDUCATION/TRAINING PROGRAM

## 2020-01-01 PROCEDURE — 85007 BL SMEAR W/DIFF WBC COUNT: CPT | Performed by: STUDENT IN AN ORGANIZED HEALTH CARE EDUCATION/TRAINING PROGRAM

## 2020-01-01 PROCEDURE — 96374 THER/PROPH/DIAG INJ IV PUSH: CPT

## 2020-01-01 PROCEDURE — 80048 BASIC METABOLIC PNL TOTAL CA: CPT | Performed by: INTERNAL MEDICINE

## 2020-01-01 PROCEDURE — 36620 INSERTION CATHETER ARTERY: CPT

## 2020-01-01 PROCEDURE — 86376 MICROSOMAL ANTIBODY EACH: CPT | Performed by: FAMILY MEDICINE

## 2020-01-01 PROCEDURE — 85027 COMPLETE CBC AUTOMATED: CPT | Performed by: INTERNAL MEDICINE

## 2020-01-01 PROCEDURE — 85045 AUTOMATED RETICULOCYTE COUNT: CPT | Performed by: STUDENT IN AN ORGANIZED HEALTH CARE EDUCATION/TRAINING PROGRAM

## 2020-01-01 PROCEDURE — 99233 SBSQ HOSP IP/OBS HIGH 50: CPT | Performed by: INTERNAL MEDICINE

## 2020-01-01 PROCEDURE — 36514 APHERESIS PLASMA: CPT

## 2020-01-01 PROCEDURE — 87086 URINE CULTURE/COLONY COUNT: CPT | Performed by: PHYSICIAN ASSISTANT

## 2020-01-01 PROCEDURE — 02H633Z INSERTION OF INFUSION DEVICE INTO RIGHT ATRIUM, PERCUTANEOUS APPROACH: ICD-10-PCS | Performed by: RADIOLOGY

## 2020-01-01 PROCEDURE — 85014 HEMATOCRIT: CPT | Performed by: FAMILY MEDICINE

## 2020-01-01 PROCEDURE — 86900 BLOOD TYPING SEROLOGIC ABO: CPT | Performed by: INTERNAL MEDICINE

## 2020-01-01 PROCEDURE — 38222 DX BONE MARROW BX & ASPIR: CPT

## 2020-01-01 PROCEDURE — 87086 URINE CULTURE/COLONY COUNT: CPT | Performed by: FAMILY MEDICINE

## 2020-01-01 PROCEDURE — 82330 ASSAY OF CALCIUM: CPT | Performed by: INTERNAL MEDICINE

## 2020-01-01 PROCEDURE — 86147 CARDIOLIPIN ANTIBODY EA IG: CPT | Performed by: INTERNAL MEDICINE

## 2020-01-01 PROCEDURE — 84132 ASSAY OF SERUM POTASSIUM: CPT

## 2020-01-01 PROCEDURE — 84100 ASSAY OF PHOSPHORUS: CPT | Performed by: NURSE PRACTITIONER

## 2020-01-01 PROCEDURE — 92950 HEART/LUNG RESUSCITATION CPR: CPT

## 2020-01-01 PROCEDURE — 80048 BASIC METABOLIC PNL TOTAL CA: CPT | Performed by: EMERGENCY MEDICINE

## 2020-01-01 PROCEDURE — 86923 COMPATIBILITY TEST ELECTRIC: CPT

## 2020-01-01 PROCEDURE — 83735 ASSAY OF MAGNESIUM: CPT | Performed by: STUDENT IN AN ORGANIZED HEALTH CARE EDUCATION/TRAINING PROGRAM

## 2020-01-01 PROCEDURE — 85384 FIBRINOGEN ACTIVITY: CPT | Performed by: INTERNAL MEDICINE

## 2020-01-01 PROCEDURE — 99152 MOD SED SAME PHYS/QHP 5/>YRS: CPT

## 2020-01-01 PROCEDURE — 85027 COMPLETE CBC AUTOMATED: CPT | Performed by: EMERGENCY MEDICINE

## 2020-01-01 PROCEDURE — 88361 TUMOR IMMUNOHISTOCHEM/COMPUT: CPT | Performed by: PATHOLOGY

## 2020-01-01 PROCEDURE — 85007 BL SMEAR W/DIFF WBC COUNT: CPT | Performed by: FAMILY MEDICINE

## 2020-01-01 PROCEDURE — 88313 SPECIAL STAINS GROUP 2: CPT | Performed by: PATHOLOGY

## 2020-01-01 PROCEDURE — 83605 ASSAY OF LACTIC ACID: CPT | Performed by: EMERGENCY MEDICINE

## 2020-01-01 PROCEDURE — 95715 VEEG EA 12-26HR INTMT MNTR: CPT

## 2020-01-01 PROCEDURE — 83605 ASSAY OF LACTIC ACID: CPT | Performed by: PHYSICIAN ASSISTANT

## 2020-01-01 PROCEDURE — 97535 SELF CARE MNGMENT TRAINING: CPT

## 2020-01-01 PROCEDURE — A9503 TC99M MEDRONATE: HCPCS

## 2020-01-01 PROCEDURE — 77001 FLUOROGUIDE FOR VEIN DEVICE: CPT | Performed by: RADIOLOGY

## 2020-01-01 PROCEDURE — 95720 EEG PHY/QHP EA INCR W/VEEG: CPT | Performed by: PSYCHIATRY & NEUROLOGY

## 2020-01-01 PROCEDURE — 84134 ASSAY OF PREALBUMIN: CPT | Performed by: FAMILY MEDICINE

## 2020-01-01 PROCEDURE — 99233 SBSQ HOSP IP/OBS HIGH 50: CPT | Performed by: ANESTHESIOLOGY

## 2020-01-01 PROCEDURE — 83735 ASSAY OF MAGNESIUM: CPT | Performed by: INTERNAL MEDICINE

## 2020-01-01 PROCEDURE — 30233N1 TRANSFUSION OF NONAUTOLOGOUS RED BLOOD CELLS INTO PERIPHERAL VEIN, PERCUTANEOUS APPROACH: ICD-10-PCS | Performed by: ANESTHESIOLOGY

## 2020-01-01 PROCEDURE — 82330 ASSAY OF CALCIUM: CPT | Performed by: FAMILY MEDICINE

## 2020-01-01 PROCEDURE — U0003 INFECTIOUS AGENT DETECTION BY NUCLEIC ACID (DNA OR RNA); SEVERE ACUTE RESPIRATORY SYNDROME CORONAVIRUS 2 (SARS-COV-2) (CORONAVIRUS DISEASE [COVID-19]), AMPLIFIED PROBE TECHNIQUE, MAKING USE OF HIGH THROUGHPUT TECHNOLOGIES AS DESCRIBED BY CMS-2020-01-R: HCPCS | Performed by: FAMILY MEDICINE

## 2020-01-01 PROCEDURE — 36556 INSERT NON-TUNNEL CV CATH: CPT | Performed by: RADIOLOGY

## 2020-01-01 PROCEDURE — 0BH18EZ INSERTION OF ENDOTRACHEAL AIRWAY INTO TRACHEA, VIA NATURAL OR ARTIFICIAL OPENING ENDOSCOPIC: ICD-10-PCS | Performed by: EMERGENCY MEDICINE

## 2020-01-01 PROCEDURE — 85610 PROTHROMBIN TIME: CPT | Performed by: EMERGENCY MEDICINE

## 2020-01-01 PROCEDURE — 94760 N-INVAS EAR/PLS OXIMETRY 1: CPT

## 2020-01-01 PROCEDURE — 84100 ASSAY OF PHOSPHORUS: CPT | Performed by: INTERNAL MEDICINE

## 2020-01-01 PROCEDURE — 86038 ANTINUCLEAR ANTIBODIES: CPT | Performed by: INTERNAL MEDICINE

## 2020-01-01 PROCEDURE — 3044F HG A1C LEVEL LT 7.0%: CPT | Performed by: PHYSICIAN ASSISTANT

## 2020-01-01 PROCEDURE — 93005 ELECTROCARDIOGRAM TRACING: CPT

## 2020-01-01 PROCEDURE — 82248 BILIRUBIN DIRECT: CPT | Performed by: STUDENT IN AN ORGANIZED HEALTH CARE EDUCATION/TRAINING PROGRAM

## 2020-01-01 PROCEDURE — 82550 ASSAY OF CK (CPK): CPT | Performed by: PHYSICIAN ASSISTANT

## 2020-01-01 PROCEDURE — 85610 PROTHROMBIN TIME: CPT | Performed by: INTERNAL MEDICINE

## 2020-01-01 PROCEDURE — 85730 THROMBOPLASTIN TIME PARTIAL: CPT | Performed by: EMERGENCY MEDICINE

## 2020-01-01 PROCEDURE — 6A550Z3 PHERESIS OF PLASMA, SINGLE: ICD-10-PCS | Performed by: FAMILY MEDICINE

## 2020-01-01 PROCEDURE — 97530 THERAPEUTIC ACTIVITIES: CPT

## 2020-01-01 PROCEDURE — C1894 INTRO/SHEATH, NON-LASER: HCPCS

## 2020-01-01 PROCEDURE — 85610 PROTHROMBIN TIME: CPT | Performed by: STUDENT IN AN ORGANIZED HEALTH CARE EDUCATION/TRAINING PROGRAM

## 2020-01-01 PROCEDURE — 85397 CLOTTING FUNCT ACTIVITY: CPT | Performed by: INTERNAL MEDICINE

## 2020-01-01 PROCEDURE — 88185 FLOWCYTOMETRY/TC ADD-ON: CPT

## 2020-01-01 PROCEDURE — 88311 DECALCIFY TISSUE: CPT | Performed by: PATHOLOGY

## 2020-01-01 PROCEDURE — 99232 SBSQ HOSP IP/OBS MODERATE 35: CPT | Performed by: PSYCHIATRY & NEUROLOGY

## 2020-01-01 PROCEDURE — 85610 PROTHROMBIN TIME: CPT | Performed by: ANESTHESIOLOGY

## 2020-01-01 PROCEDURE — 70498 CT ANGIOGRAPHY NECK: CPT

## 2020-01-01 PROCEDURE — 86850 RBC ANTIBODY SCREEN: CPT | Performed by: FAMILY MEDICINE

## 2020-01-01 PROCEDURE — 83540 ASSAY OF IRON: CPT | Performed by: PHYSICIAN ASSISTANT

## 2020-01-01 PROCEDURE — 83540 ASSAY OF IRON: CPT | Performed by: INTERNAL MEDICINE

## 2020-01-01 PROCEDURE — 88262 CHROMOSOME ANALYSIS 15-20: CPT | Performed by: INTERNAL MEDICINE

## 2020-01-01 PROCEDURE — 70496 CT ANGIOGRAPHY HEAD: CPT

## 2020-01-01 PROCEDURE — 80202 ASSAY OF VANCOMYCIN: CPT | Performed by: EMERGENCY MEDICINE

## 2020-01-01 PROCEDURE — 84100 ASSAY OF PHOSPHORUS: CPT | Performed by: STUDENT IN AN ORGANIZED HEALTH CARE EDUCATION/TRAINING PROGRAM

## 2020-01-01 PROCEDURE — 85025 COMPLETE CBC W/AUTO DIFF WBC: CPT | Performed by: FAMILY MEDICINE

## 2020-01-01 PROCEDURE — 88342 IMHCHEM/IMCYTCHM 1ST ANTB: CPT | Performed by: PATHOLOGY

## 2020-01-01 PROCEDURE — 86850 RBC ANTIBODY SCREEN: CPT | Performed by: EMERGENCY MEDICINE

## 2020-01-01 PROCEDURE — 6A550Z3 PHERESIS OF PLASMA, SINGLE: ICD-10-PCS | Performed by: INTERNAL MEDICINE

## 2020-01-01 PROCEDURE — 99024 POSTOP FOLLOW-UP VISIT: CPT | Performed by: RADIOLOGY

## 2020-01-01 PROCEDURE — 6A551Z3 PHERESIS OF PLASMA, MULTIPLE: ICD-10-PCS | Performed by: ANESTHESIOLOGY

## 2020-01-01 PROCEDURE — 84100 ASSAY OF PHOSPHORUS: CPT | Performed by: EMERGENCY MEDICINE

## 2020-01-01 PROCEDURE — 85730 THROMBOPLASTIN TIME PARTIAL: CPT | Performed by: STUDENT IN AN ORGANIZED HEALTH CARE EDUCATION/TRAINING PROGRAM

## 2020-01-01 PROCEDURE — 97163 PT EVAL HIGH COMPLEX 45 MIN: CPT

## 2020-01-01 PROCEDURE — 85025 COMPLETE CBC W/AUTO DIFF WBC: CPT

## 2020-01-01 PROCEDURE — 85014 HEMATOCRIT: CPT | Performed by: STUDENT IN AN ORGANIZED HEALTH CARE EDUCATION/TRAINING PROGRAM

## 2020-01-01 PROCEDURE — 85397 CLOTTING FUNCT ACTIVITY: CPT | Performed by: STUDENT IN AN ORGANIZED HEALTH CARE EDUCATION/TRAINING PROGRAM

## 2020-01-01 PROCEDURE — 31500 INSERT EMERGENCY AIRWAY: CPT | Performed by: NURSE PRACTITIONER

## 2020-01-01 PROCEDURE — 86225 DNA ANTIBODY NATIVE: CPT | Performed by: INTERNAL MEDICINE

## 2020-01-01 PROCEDURE — NC001 PR NO CHARGE: Performed by: FAMILY MEDICINE

## 2020-01-01 PROCEDURE — 88341 IMHCHEM/IMCYTCHM EA ADD ANTB: CPT | Performed by: PATHOLOGY

## 2020-01-01 PROCEDURE — 76937 US GUIDE VASCULAR ACCESS: CPT

## 2020-01-01 PROCEDURE — 86850 RBC ANTIBODY SCREEN: CPT | Performed by: INTERNAL MEDICINE

## 2020-01-01 PROCEDURE — 92526 ORAL FUNCTION THERAPY: CPT

## 2020-01-01 PROCEDURE — 85027 COMPLETE CBC AUTOMATED: CPT | Performed by: STUDENT IN AN ORGANIZED HEALTH CARE EDUCATION/TRAINING PROGRAM

## 2020-01-01 PROCEDURE — 86160 COMPLEMENT ANTIGEN: CPT | Performed by: INTERNAL MEDICINE

## 2020-01-01 PROCEDURE — 6A550Z3 PHERESIS OF PLASMA, SINGLE: ICD-10-PCS | Performed by: STUDENT IN AN ORGANIZED HEALTH CARE EDUCATION/TRAINING PROGRAM

## 2020-01-01 PROCEDURE — 84166 PROTEIN E-PHORESIS/URINE/CSF: CPT | Performed by: INTERNAL MEDICINE

## 2020-01-01 PROCEDURE — 99291 CRITICAL CARE FIRST HOUR: CPT | Performed by: ANESTHESIOLOGY

## 2020-01-01 PROCEDURE — 83036 HEMOGLOBIN GLYCOSYLATED A1C: CPT

## 2020-01-01 PROCEDURE — 2022F DILAT RTA XM EVC RTNOPTHY: CPT | Performed by: PHYSICIAN ASSISTANT

## 2020-01-01 PROCEDURE — 88237 TISSUE CULTURE BONE MARROW: CPT | Performed by: INTERNAL MEDICINE

## 2020-01-01 PROCEDURE — 36556 INSERT NON-TUNNEL CV CATH: CPT

## 2020-01-01 PROCEDURE — 76937 US GUIDE VASCULAR ACCESS: CPT | Performed by: RADIOLOGY

## 2020-01-01 PROCEDURE — 86335 IMMUNFIX E-PHORSIS/URINE/CSF: CPT | Performed by: INTERNAL MEDICINE

## 2020-01-01 PROCEDURE — 82570 ASSAY OF URINE CREATININE: CPT | Performed by: PHYSICIAN ASSISTANT

## 2020-01-01 PROCEDURE — 84165 PROTEIN E-PHORESIS SERUM: CPT | Performed by: INTERNAL MEDICINE

## 2020-01-01 PROCEDURE — 85045 AUTOMATED RETICULOCYTE COUNT: CPT | Performed by: PHYSICIAN ASSISTANT

## 2020-01-01 PROCEDURE — 83036 HEMOGLOBIN GLYCOSYLATED A1C: CPT | Performed by: PHYSICIAN ASSISTANT

## 2020-01-01 PROCEDURE — 99223 1ST HOSP IP/OBS HIGH 75: CPT | Performed by: INTERNAL MEDICINE

## 2020-01-01 PROCEDURE — NC001 PR NO CHARGE: Performed by: PHYSICIAN ASSISTANT

## 2020-01-01 PROCEDURE — 82525 ASSAY OF COPPER: CPT | Performed by: PHYSICIAN ASSISTANT

## 2020-01-01 PROCEDURE — 77012 CT SCAN FOR NEEDLE BIOPSY: CPT

## 2020-01-01 PROCEDURE — 97167 OT EVAL HIGH COMPLEX 60 MIN: CPT

## 2020-01-01 PROCEDURE — 86901 BLOOD TYPING SEROLOGIC RH(D): CPT | Performed by: STUDENT IN AN ORGANIZED HEALTH CARE EDUCATION/TRAINING PROGRAM

## 2020-01-01 PROCEDURE — 95700 EEG CONT REC W/VID EEG TECH: CPT

## 2020-01-01 PROCEDURE — 80053 COMPREHEN METABOLIC PANEL: CPT | Performed by: FAMILY MEDICINE

## 2020-01-01 PROCEDURE — C1769 GUIDE WIRE: HCPCS

## 2020-01-01 PROCEDURE — 81450 HL NEO GSAP 5-50DNA/DNA&RNA: CPT | Performed by: INTERNAL MEDICINE

## 2020-01-01 PROCEDURE — C1750 CATH, HEMODIALYSIS,LONG-TERM: HCPCS

## 2020-01-01 PROCEDURE — 80076 HEPATIC FUNCTION PANEL: CPT | Performed by: EMERGENCY MEDICINE

## 2020-01-01 PROCEDURE — 82805 BLOOD GASES W/O2 SATURATION: CPT | Performed by: NURSE PRACTITIONER

## 2020-01-01 PROCEDURE — 85025 COMPLETE CBC W/AUTO DIFF WBC: CPT | Performed by: PHYSICIAN ASSISTANT

## 2020-01-01 PROCEDURE — 85045 AUTOMATED RETICULOCYTE COUNT: CPT | Performed by: INTERNAL MEDICINE

## 2020-01-01 PROCEDURE — NC001 PR NO CHARGE: Performed by: EMERGENCY MEDICINE

## 2020-01-01 PROCEDURE — 77001 FLUOROGUIDE FOR VEIN DEVICE: CPT

## 2020-01-01 PROCEDURE — 85018 HEMOGLOBIN: CPT | Performed by: STUDENT IN AN ORGANIZED HEALTH CARE EDUCATION/TRAINING PROGRAM

## 2020-01-01 PROCEDURE — 97116 GAIT TRAINING THERAPY: CPT

## 2020-01-01 PROCEDURE — 86900 BLOOD TYPING SEROLOGIC ABO: CPT | Performed by: FAMILY MEDICINE

## 2020-01-01 PROCEDURE — 78306 BONE IMAGING WHOLE BODY: CPT

## 2020-01-01 PROCEDURE — 97168 OT RE-EVAL EST PLAN CARE: CPT

## 2020-01-01 PROCEDURE — 84484 ASSAY OF TROPONIN QUANT: CPT | Performed by: FAMILY MEDICINE

## 2020-01-01 PROCEDURE — 82390 ASSAY OF CERULOPLASMIN: CPT | Performed by: PHYSICIAN ASSISTANT

## 2020-01-01 PROCEDURE — 30233N1 TRANSFUSION OF NONAUTOLOGOUS RED BLOOD CELLS INTO PERIPHERAL VEIN, PERCUTANEOUS APPROACH: ICD-10-PCS | Performed by: EMERGENCY MEDICINE

## 2020-01-01 PROCEDURE — 92250 FUNDUS PHOTOGRAPHY W/I&R: CPT | Performed by: FAMILY MEDICINE

## 2020-01-01 PROCEDURE — 83550 IRON BINDING TEST: CPT | Performed by: PHYSICIAN ASSISTANT

## 2020-01-01 PROCEDURE — 36558 INSERT TUNNELED CV CATH: CPT | Performed by: RADIOLOGY

## 2020-01-01 PROCEDURE — 99233 SBSQ HOSP IP/OBS HIGH 50: CPT | Performed by: STUDENT IN AN ORGANIZED HEALTH CARE EDUCATION/TRAINING PROGRAM

## 2020-01-01 PROCEDURE — 84100 ASSAY OF PHOSPHORUS: CPT | Performed by: ANESTHESIOLOGY

## 2020-01-01 PROCEDURE — 88184 FLOWCYTOMETRY/ TC 1 MARKER: CPT | Performed by: INTERNAL MEDICINE

## 2020-01-01 PROCEDURE — 82330 ASSAY OF CALCIUM: CPT | Performed by: NURSE PRACTITIONER

## 2020-01-01 PROCEDURE — 82248 BILIRUBIN DIRECT: CPT | Performed by: INTERNAL MEDICINE

## 2020-01-01 PROCEDURE — 82728 ASSAY OF FERRITIN: CPT | Performed by: INTERNAL MEDICINE

## 2020-01-01 PROCEDURE — 84443 ASSAY THYROID STIM HORMONE: CPT

## 2020-01-01 PROCEDURE — 84145 PROCALCITONIN (PCT): CPT | Performed by: STUDENT IN AN ORGANIZED HEALTH CARE EDUCATION/TRAINING PROGRAM

## 2020-01-01 PROCEDURE — 84295 ASSAY OF SERUM SODIUM: CPT

## 2020-01-01 PROCEDURE — 3079F DIAST BP 80-89 MM HG: CPT | Performed by: PHYSICIAN ASSISTANT

## 2020-01-01 PROCEDURE — 97164 PT RE-EVAL EST PLAN CARE: CPT

## 2020-01-01 PROCEDURE — 86900 BLOOD TYPING SEROLOGIC ABO: CPT | Performed by: STUDENT IN AN ORGANIZED HEALTH CARE EDUCATION/TRAINING PROGRAM

## 2020-01-01 PROCEDURE — 86900 BLOOD TYPING SEROLOGIC ABO: CPT | Performed by: EMERGENCY MEDICINE

## 2020-01-01 PROCEDURE — 86162 COMPLEMENT TOTAL (CH50): CPT | Performed by: INTERNAL MEDICINE

## 2020-01-01 PROCEDURE — 86901 BLOOD TYPING SEROLOGIC RH(D): CPT | Performed by: INTERNAL MEDICINE

## 2020-01-01 PROCEDURE — 84443 ASSAY THYROID STIM HORMONE: CPT | Performed by: PHYSICIAN ASSISTANT

## 2020-01-01 PROCEDURE — 99232 SBSQ HOSP IP/OBS MODERATE 35: CPT | Performed by: PHYSICIAN ASSISTANT

## 2020-01-01 PROCEDURE — 83090 ASSAY OF HOMOCYSTEINE: CPT | Performed by: INTERNAL MEDICINE

## 2020-01-01 PROCEDURE — 74176 CT ABD & PELVIS W/O CONTRAST: CPT

## 2020-01-01 PROCEDURE — NC001 PR NO CHARGE: Performed by: RADIOLOGY

## 2020-01-01 PROCEDURE — 99291 CRITICAL CARE FIRST HOUR: CPT | Performed by: PHYSICIAN ASSISTANT

## 2020-01-01 PROCEDURE — 83615 LACTATE (LD) (LDH) ENZYME: CPT | Performed by: PHYSICIAN ASSISTANT

## 2020-01-01 PROCEDURE — 87086 URINE CULTURE/COLONY COUNT: CPT | Performed by: INTERNAL MEDICINE

## 2020-01-01 PROCEDURE — 83918 ORGANIC ACIDS TOTAL QUANT: CPT | Performed by: INTERNAL MEDICINE

## 2020-01-01 PROCEDURE — 6A551Z3 PHERESIS OF PLASMA, MULTIPLE: ICD-10-PCS | Performed by: INTERNAL MEDICINE

## 2020-01-01 PROCEDURE — 85384 FIBRINOGEN ACTIVITY: CPT | Performed by: EMERGENCY MEDICINE

## 2020-01-01 PROCEDURE — 82805 BLOOD GASES W/O2 SATURATION: CPT | Performed by: INTERNAL MEDICINE

## 2020-01-01 PROCEDURE — XW013W5 INTRODUCTION OF CAPLACIZUMAB INTO SUBCUTANEOUS TISSUE, PERCUTANEOUS APPROACH, NEW TECHNOLOGY GROUP 5: ICD-10-PCS | Performed by: ANESTHESIOLOGY

## 2020-01-01 PROCEDURE — 93306 TTE W/DOPPLER COMPLETE: CPT | Performed by: INTERNAL MEDICINE

## 2020-01-01 PROCEDURE — 80061 LIPID PANEL: CPT

## 2020-01-01 PROCEDURE — ND001 PR NO DOCUMENTATION: Performed by: FAMILY MEDICINE

## 2020-01-01 PROCEDURE — 83615 LACTATE (LD) (LDH) ENZYME: CPT | Performed by: NURSE PRACTITIONER

## 2020-01-01 PROCEDURE — 4010F ACE/ARB THERAPY RXD/TAKEN: CPT | Performed by: PHYSICIAN ASSISTANT

## 2020-01-01 PROCEDURE — 84146 ASSAY OF PROLACTIN: CPT | Performed by: INTERNAL MEDICINE

## 2020-01-01 PROCEDURE — 5A1935Z RESPIRATORY VENTILATION, LESS THAN 24 CONSECUTIVE HOURS: ICD-10-PCS | Performed by: STUDENT IN AN ORGANIZED HEALTH CARE EDUCATION/TRAINING PROGRAM

## 2020-01-01 PROCEDURE — NC001 PR NO CHARGE: Performed by: PSYCHIATRY & NEUROLOGY

## 2020-01-01 PROCEDURE — 85014 HEMATOCRIT: CPT | Performed by: EMERGENCY MEDICINE

## 2020-01-01 PROCEDURE — 82550 ASSAY OF CK (CPK): CPT | Performed by: INTERNAL MEDICINE

## 2020-01-01 PROCEDURE — 73200 CT UPPER EXTREMITY W/O DYE: CPT

## 2020-01-01 PROCEDURE — 85014 HEMATOCRIT: CPT

## 2020-01-01 PROCEDURE — 90734 MENACWYD/MENACWYCRM VACC IM: CPT | Performed by: FAMILY MEDICINE

## 2020-01-01 PROCEDURE — 86850 RBC ANTIBODY SCREEN: CPT | Performed by: STUDENT IN AN ORGANIZED HEALTH CARE EDUCATION/TRAINING PROGRAM

## 2020-01-01 PROCEDURE — 97110 THERAPEUTIC EXERCISES: CPT

## 2020-01-01 PROCEDURE — 82330 ASSAY OF CALCIUM: CPT

## 2020-01-01 PROCEDURE — 80053 COMPREHEN METABOLIC PANEL: CPT

## 2020-01-01 PROCEDURE — 99291 CRITICAL CARE FIRST HOUR: CPT | Performed by: NURSE PRACTITIONER

## 2020-01-01 PROCEDURE — 81001 URINALYSIS AUTO W/SCOPE: CPT

## 2020-01-01 PROCEDURE — 36600 WITHDRAWAL OF ARTERIAL BLOOD: CPT

## 2020-01-01 PROCEDURE — 82043 UR ALBUMIN QUANTITATIVE: CPT | Performed by: PHYSICIAN ASSISTANT

## 2020-01-01 PROCEDURE — 99222 1ST HOSP IP/OBS MODERATE 55: CPT | Performed by: PSYCHIATRY & NEUROLOGY

## 2020-01-01 PROCEDURE — 85018 HEMOGLOBIN: CPT | Performed by: INTERNAL MEDICINE

## 2020-01-01 PROCEDURE — 84100 ASSAY OF PHOSPHORUS: CPT | Performed by: FAMILY MEDICINE

## 2020-01-01 PROCEDURE — 99291 CRITICAL CARE FIRST HOUR: CPT | Performed by: EMERGENCY MEDICINE

## 2020-01-01 PROCEDURE — 82803 BLOOD GASES ANY COMBINATION: CPT

## 2020-01-01 PROCEDURE — 84166 PROTEIN E-PHORESIS/URINE/CSF: CPT | Performed by: PATHOLOGY

## 2020-01-01 PROCEDURE — 99222 1ST HOSP IP/OBS MODERATE 55: CPT | Performed by: INTERNAL MEDICINE

## 2020-01-01 PROCEDURE — 86850 RBC ANTIBODY SCREEN: CPT | Performed by: NURSE PRACTITIONER

## 2020-01-01 PROCEDURE — 82140 ASSAY OF AMMONIA: CPT | Performed by: FAMILY MEDICINE

## 2020-01-01 PROCEDURE — 82607 VITAMIN B-12: CPT | Performed by: INTERNAL MEDICINE

## 2020-01-01 PROCEDURE — 99153 MOD SED SAME PHYS/QHP EA: CPT

## 2020-01-01 PROCEDURE — 72125 CT NECK SPINE W/O DYE: CPT

## 2020-01-01 PROCEDURE — 85610 PROTHROMBIN TIME: CPT | Performed by: FAMILY MEDICINE

## 2020-01-01 PROCEDURE — 83550 IRON BINDING TEST: CPT | Performed by: INTERNAL MEDICINE

## 2020-01-01 PROCEDURE — 30233N1 TRANSFUSION OF NONAUTOLOGOUS RED BLOOD CELLS INTO PERIPHERAL VEIN, PERCUTANEOUS APPROACH: ICD-10-PCS | Performed by: FAMILY MEDICINE

## 2020-01-01 PROCEDURE — 87040 BLOOD CULTURE FOR BACTERIA: CPT | Performed by: STUDENT IN AN ORGANIZED HEALTH CARE EDUCATION/TRAINING PROGRAM

## 2020-01-01 PROCEDURE — 83690 ASSAY OF LIPASE: CPT | Performed by: EMERGENCY MEDICINE

## 2020-01-01 PROCEDURE — 3008F BODY MASS INDEX DOCD: CPT | Performed by: PHYSICIAN ASSISTANT

## 2020-01-01 PROCEDURE — 82947 ASSAY GLUCOSE BLOOD QUANT: CPT

## 2020-01-01 PROCEDURE — 74018 RADEX ABDOMEN 1 VIEW: CPT

## 2020-01-01 PROCEDURE — 81001 URINALYSIS AUTO W/SCOPE: CPT | Performed by: FAMILY MEDICINE

## 2020-01-01 PROCEDURE — 83010 ASSAY OF HAPTOGLOBIN QUANT: CPT | Performed by: PHYSICIAN ASSISTANT

## 2020-01-01 PROCEDURE — 87081 CULTURE SCREEN ONLY: CPT | Performed by: EMERGENCY MEDICINE

## 2020-01-01 PROCEDURE — 80048 BASIC METABOLIC PNL TOTAL CA: CPT | Performed by: FAMILY MEDICINE

## 2020-01-01 PROCEDURE — 36558 INSERT TUNNELED CV CATH: CPT

## 2020-01-01 PROCEDURE — 82805 BLOOD GASES W/O2 SATURATION: CPT | Performed by: PHYSICIAN ASSISTANT

## 2020-01-01 PROCEDURE — C1752 CATH,HEMODIALYSIS,SHORT-TERM: HCPCS

## 2020-01-01 PROCEDURE — 85014 HEMATOCRIT: CPT | Performed by: INTERNAL MEDICINE

## 2020-01-01 PROCEDURE — 83605 ASSAY OF LACTIC ACID: CPT | Performed by: FAMILY MEDICINE

## 2020-01-01 PROCEDURE — 5A12012 PERFORMANCE OF CARDIAC OUTPUT, SINGLE, MANUAL: ICD-10-PCS | Performed by: STUDENT IN AN ORGANIZED HEALTH CARE EDUCATION/TRAINING PROGRAM

## 2020-01-01 PROCEDURE — 85025 COMPLETE CBC W/AUTO DIFF WBC: CPT | Performed by: STUDENT IN AN ORGANIZED HEALTH CARE EDUCATION/TRAINING PROGRAM

## 2020-01-01 PROCEDURE — 86022 PLATELET ANTIBODIES: CPT | Performed by: EMERGENCY MEDICINE

## 2020-01-01 PROCEDURE — 84110 ASSAY OF PORPHOBILINOGEN: CPT | Performed by: INTERNAL MEDICINE

## 2020-01-01 PROCEDURE — 87340 HEPATITIS B SURFACE AG IA: CPT | Performed by: INTERNAL MEDICINE

## 2020-01-01 PROCEDURE — 82272 OCCULT BLD FECES 1-3 TESTS: CPT | Performed by: PHYSICIAN ASSISTANT

## 2020-01-01 PROCEDURE — 88184 FLOWCYTOMETRY/ TC 1 MARKER: CPT | Performed by: STUDENT IN AN ORGANIZED HEALTH CARE EDUCATION/TRAINING PROGRAM

## 2020-01-01 PROCEDURE — 83735 ASSAY OF MAGNESIUM: CPT | Performed by: FAMILY MEDICINE

## 2020-01-01 PROCEDURE — 3074F SYST BP LT 130 MM HG: CPT | Performed by: PHYSICIAN ASSISTANT

## 2020-01-01 PROCEDURE — 84484 ASSAY OF TROPONIN QUANT: CPT | Performed by: EMERGENCY MEDICINE

## 2020-01-01 PROCEDURE — 73090 X-RAY EXAM OF FOREARM: CPT

## 2020-01-01 PROCEDURE — 96375 TX/PRO/DX INJ NEW DRUG ADDON: CPT

## 2020-01-01 PROCEDURE — 84145 PROCALCITONIN (PCT): CPT | Performed by: INTERNAL MEDICINE

## 2020-01-01 PROCEDURE — 0BH17EZ INSERTION OF ENDOTRACHEAL AIRWAY INTO TRACHEA, VIA NATURAL OR ARTIFICIAL OPENING: ICD-10-PCS | Performed by: INTERNAL MEDICINE

## 2020-01-01 PROCEDURE — 5A1935Z RESPIRATORY VENTILATION, LESS THAN 24 CONSECUTIVE HOURS: ICD-10-PCS | Performed by: INTERNAL MEDICINE

## 2020-01-01 PROCEDURE — 96361 HYDRATE IV INFUSION ADD-ON: CPT

## 2020-01-01 PROCEDURE — 85730 THROMBOPLASTIN TIME PARTIAL: CPT | Performed by: ANESTHESIOLOGY

## 2020-01-01 PROCEDURE — 88305 TISSUE EXAM BY PATHOLOGIST: CPT | Performed by: PATHOLOGY

## 2020-01-01 PROCEDURE — 86704 HEP B CORE ANTIBODY TOTAL: CPT | Performed by: INTERNAL MEDICINE

## 2020-01-01 PROCEDURE — 36573 INSJ PICC RS&I 5 YR+: CPT

## 2020-01-01 PROCEDURE — 86901 BLOOD TYPING SEROLOGIC RH(D): CPT | Performed by: NURSE PRACTITIONER

## 2020-01-01 PROCEDURE — 82553 CREATINE MB FRACTION: CPT | Performed by: INTERNAL MEDICINE

## 2020-01-01 PROCEDURE — 36415 COLL VENOUS BLD VENIPUNCTURE: CPT | Performed by: EMERGENCY MEDICINE

## 2020-01-01 PROCEDURE — 99223 1ST HOSP IP/OBS HIGH 75: CPT | Performed by: PHYSICIAN ASSISTANT

## 2020-01-01 PROCEDURE — 99214 OFFICE O/P EST MOD 30 MIN: CPT | Performed by: PHYSICIAN ASSISTANT

## 2020-01-01 PROCEDURE — 83735 ASSAY OF MAGNESIUM: CPT | Performed by: NURSE PRACTITIONER

## 2020-01-01 PROCEDURE — 85097 BONE MARROW INTERPRETATION: CPT | Performed by: PATHOLOGY

## 2020-01-01 PROCEDURE — 80053 COMPREHEN METABOLIC PANEL: CPT | Performed by: PHYSICIAN ASSISTANT

## 2020-01-01 PROCEDURE — 83883 ASSAY NEPHELOMETRY NOT SPEC: CPT | Performed by: INTERNAL MEDICINE

## 2020-01-01 PROCEDURE — 86880 COOMBS TEST DIRECT: CPT | Performed by: INTERNAL MEDICINE

## 2020-01-01 PROCEDURE — 82542 COL CHROMOTOGRAPHY QUAL/QUAN: CPT | Performed by: EMERGENCY MEDICINE

## 2020-01-01 PROCEDURE — 82728 ASSAY OF FERRITIN: CPT | Performed by: PHYSICIAN ASSISTANT

## 2020-01-01 PROCEDURE — 87040 BLOOD CULTURE FOR BACTERIA: CPT | Performed by: INTERNAL MEDICINE

## 2020-01-01 PROCEDURE — 85007 BL SMEAR W/DIFF WBC COUNT: CPT | Performed by: EMERGENCY MEDICINE

## 2020-01-01 PROCEDURE — 85018 HEMOGLOBIN: CPT | Performed by: EMERGENCY MEDICINE

## 2020-01-01 PROCEDURE — 86235 NUCLEAR ANTIGEN ANTIBODY: CPT | Performed by: FAMILY MEDICINE

## 2020-01-01 PROCEDURE — 36415 COLL VENOUS BLD VENIPUNCTURE: CPT

## 2020-01-01 PROCEDURE — 99233 SBSQ HOSP IP/OBS HIGH 50: CPT | Performed by: PHYSICIAN ASSISTANT

## 2020-01-01 PROCEDURE — 86900 BLOOD TYPING SEROLOGIC ABO: CPT | Performed by: NURSE PRACTITIONER

## 2020-01-01 PROCEDURE — 82330 ASSAY OF CALCIUM: CPT | Performed by: EMERGENCY MEDICINE

## 2020-01-01 PROCEDURE — 80048 BASIC METABOLIC PNL TOTAL CA: CPT | Performed by: NURSE PRACTITIONER

## 2020-01-01 PROCEDURE — 81002 URINALYSIS NONAUTO W/O SCOPE: CPT | Performed by: PHYSICIAN ASSISTANT

## 2020-01-01 PROCEDURE — 1036F TOBACCO NON-USER: CPT | Performed by: PHYSICIAN ASSISTANT

## 2020-01-01 PROCEDURE — 82550 ASSAY OF CK (CPK): CPT | Performed by: FAMILY MEDICINE

## 2020-01-01 PROCEDURE — G2012 BRIEF CHECK IN BY MD/QHP: HCPCS | Performed by: FAMILY MEDICINE

## 2020-01-01 PROCEDURE — 93010 ELECTROCARDIOGRAM REPORT: CPT

## 2020-01-01 PROCEDURE — 99285 EMERGENCY DEPT VISIT HI MDM: CPT

## 2020-01-01 PROCEDURE — 86901 BLOOD TYPING SEROLOGIC RH(D): CPT | Performed by: EMERGENCY MEDICINE

## 2020-01-01 PROCEDURE — 83010 ASSAY OF HAPTOGLOBIN QUANT: CPT | Performed by: STUDENT IN AN ORGANIZED HEALTH CARE EDUCATION/TRAINING PROGRAM

## 2020-01-01 RX ORDER — ACETAMINOPHEN 325 MG/1
650 TABLET ORAL ONCE
Status: COMPLETED | OUTPATIENT
Start: 2020-01-01 | End: 2020-01-01

## 2020-01-01 RX ORDER — CALCIUM GLUCONATE 20 MG/ML
2 INJECTION, SOLUTION INTRAVENOUS ONCE
Status: DISCONTINUED | OUTPATIENT
Start: 2020-01-01 | End: 2020-01-01 | Stop reason: ALTCHOICE

## 2020-01-01 RX ORDER — ETOMIDATE 2 MG/ML
40 INJECTION INTRAVENOUS ONCE
Status: COMPLETED | OUTPATIENT
Start: 2020-01-01 | End: 2020-01-01

## 2020-01-01 RX ORDER — LANCING DEVICE
EACH MISCELLANEOUS
Qty: 100 EACH | Refills: 5 | Status: SHIPPED | OUTPATIENT
Start: 2020-01-01

## 2020-01-01 RX ORDER — LORAZEPAM 2 MG/ML
2 INJECTION INTRAMUSCULAR EVERY 4 HOURS PRN
Status: DISCONTINUED | OUTPATIENT
Start: 2020-01-01 | End: 2020-01-01 | Stop reason: HOSPADM

## 2020-01-01 RX ORDER — ISOPROPYL ALCOHOL 0.7 ML/ML
SWAB TOPICAL
Qty: 100 EACH | Refills: 5 | Status: SHIPPED | OUTPATIENT
Start: 2020-01-01

## 2020-01-01 RX ORDER — ANASTROZOLE 1 MG/1
1 TABLET ORAL DAILY
Status: CANCELLED | OUTPATIENT
Start: 2020-01-01

## 2020-01-01 RX ORDER — VANCOMYCIN HYDROCHLORIDE 1 G/200ML
15 INJECTION, SOLUTION INTRAVENOUS ONCE
Status: COMPLETED | OUTPATIENT
Start: 2020-01-01 | End: 2020-01-01

## 2020-01-01 RX ORDER — LEVOTHYROXINE SODIUM 88 UG/1
88 TABLET ORAL
Status: DISCONTINUED | OUTPATIENT
Start: 2020-01-01 | End: 2020-01-01 | Stop reason: HOSPADM

## 2020-01-01 RX ORDER — SODIUM CHLORIDE, SODIUM GLUCONATE, SODIUM ACETATE, POTASSIUM CHLORIDE, MAGNESIUM CHLORIDE, SODIUM PHOSPHATE, DIBASIC, AND POTASSIUM PHOSPHATE .53; .5; .37; .037; .03; .012; .00082 G/100ML; G/100ML; G/100ML; G/100ML; G/100ML; G/100ML; G/100ML
50 INJECTION, SOLUTION INTRAVENOUS CONTINUOUS
Status: DISCONTINUED | OUTPATIENT
Start: 2020-01-01 | End: 2020-01-01 | Stop reason: HOSPADM

## 2020-01-01 RX ORDER — VECURONIUM BROMIDE 1 MG/ML
INJECTION, POWDER, LYOPHILIZED, FOR SOLUTION INTRAVENOUS CODE/TRAUMA/SEDATION MEDICATION
Status: COMPLETED | OUTPATIENT
Start: 2020-01-01 | End: 2020-01-01

## 2020-01-01 RX ORDER — FAMOTIDINE 40 MG/1
40 TABLET, FILM COATED ORAL
Qty: 30 TABLET | Refills: 5 | Status: SHIPPED | OUTPATIENT
Start: 2020-01-01

## 2020-01-01 RX ORDER — LORAZEPAM 2 MG/ML
2 INJECTION INTRAMUSCULAR ONCE
Status: COMPLETED | OUTPATIENT
Start: 2020-01-01 | End: 2020-01-01

## 2020-01-01 RX ORDER — BUPROPION HYDROCHLORIDE 300 MG/1
300 TABLET ORAL DAILY
Qty: 90 TABLET | Refills: 0 | Status: SHIPPED | OUTPATIENT
Start: 2020-01-01

## 2020-01-01 RX ORDER — FENTANYL CITRATE 50 UG/ML
INJECTION, SOLUTION INTRAMUSCULAR; INTRAVENOUS CODE/TRAUMA/SEDATION MEDICATION
Status: COMPLETED | OUTPATIENT
Start: 2020-01-01 | End: 2020-01-01

## 2020-01-01 RX ORDER — POTASSIUM CHLORIDE 20MEQ/15ML
60 LIQUID (ML) ORAL ONCE
Status: COMPLETED | OUTPATIENT
Start: 2020-01-01 | End: 2020-01-01

## 2020-01-01 RX ORDER — FAMOTIDINE 40 MG/5ML
20 POWDER, FOR SUSPENSION ORAL DAILY
Status: DISCONTINUED | OUTPATIENT
Start: 2020-01-01 | End: 2020-01-01

## 2020-01-01 RX ORDER — CALCIUM GLUCONATE 20 MG/ML
1 INJECTION, SOLUTION INTRAVENOUS ONCE
Status: COMPLETED | OUTPATIENT
Start: 2020-01-01 | End: 2020-01-01

## 2020-01-01 RX ORDER — ATORVASTATIN CALCIUM 40 MG/1
40 TABLET, FILM COATED ORAL DAILY
Qty: 90 TABLET | Refills: 0 | Status: SHIPPED | OUTPATIENT
Start: 2020-01-01 | End: 2020-01-01 | Stop reason: SDUPTHER

## 2020-01-01 RX ORDER — LABETALOL 20 MG/4 ML (5 MG/ML) INTRAVENOUS SYRINGE
10 EVERY 6 HOURS PRN
Status: DISCONTINUED | OUTPATIENT
Start: 2020-01-01 | End: 2020-01-01

## 2020-01-01 RX ORDER — LABETALOL 20 MG/4 ML (5 MG/ML) INTRAVENOUS SYRINGE
10 ONCE
Status: DISCONTINUED | OUTPATIENT
Start: 2020-01-01 | End: 2020-01-01 | Stop reason: HOSPADM

## 2020-01-01 RX ORDER — PANTOPRAZOLE SODIUM 40 MG/1
40 TABLET, DELAYED RELEASE ORAL
Status: DISCONTINUED | OUTPATIENT
Start: 2020-01-01 | End: 2020-01-01 | Stop reason: HOSPADM

## 2020-01-01 RX ORDER — PROPOFOL 10 MG/ML
5-50 INJECTION, EMULSION INTRAVENOUS
Status: DISCONTINUED | OUTPATIENT
Start: 2020-01-01 | End: 2020-01-01 | Stop reason: HOSPADM

## 2020-01-01 RX ORDER — LEVOTHYROXINE SODIUM 88 UG/1
88 TABLET ORAL
Status: CANCELLED | OUTPATIENT
Start: 2020-01-01

## 2020-01-01 RX ORDER — AMLODIPINE BESYLATE 10 MG/1
10 TABLET ORAL DAILY
Status: DISCONTINUED | OUTPATIENT
Start: 2020-01-01 | End: 2020-01-01 | Stop reason: HOSPADM

## 2020-01-01 RX ORDER — PROPOFOL 10 MG/ML
5-50 INJECTION, EMULSION INTRAVENOUS
Status: DISCONTINUED | OUTPATIENT
Start: 2020-01-01 | End: 2020-01-01

## 2020-01-01 RX ORDER — SODIUM CHLORIDE 9 MG/ML
75 INJECTION, SOLUTION INTRAVENOUS CONTINUOUS
Status: DISCONTINUED | OUTPATIENT
Start: 2020-01-01 | End: 2020-01-01

## 2020-01-01 RX ORDER — ACETAMINOPHEN 325 MG/1
650 TABLET ORAL EVERY 8 HOURS PRN
Status: DISCONTINUED | OUTPATIENT
Start: 2020-01-01 | End: 2020-01-01

## 2020-01-01 RX ORDER — DIPHENHYDRAMINE HCL 25 MG
25 TABLET ORAL ONCE
Status: COMPLETED | OUTPATIENT
Start: 2020-01-01 | End: 2020-01-01

## 2020-01-01 RX ORDER — FAMOTIDINE 40 MG/5ML
40 POWDER, FOR SUSPENSION ORAL DAILY
Status: DISCONTINUED | OUTPATIENT
Start: 2020-01-01 | End: 2020-01-01

## 2020-01-01 RX ORDER — HEPARIN SODIUM 5000 [USP'U]/ML
5000 INJECTION, SOLUTION INTRAVENOUS; SUBCUTANEOUS EVERY 8 HOURS SCHEDULED
Status: DISCONTINUED | OUTPATIENT
Start: 2020-01-01 | End: 2020-01-01

## 2020-01-01 RX ORDER — METHYLPREDNISOLONE SODIUM SUCCINATE 40 MG/ML
20 INJECTION, POWDER, LYOPHILIZED, FOR SOLUTION INTRAMUSCULAR; INTRAVENOUS DAILY
Status: DISCONTINUED | OUTPATIENT
Start: 2020-01-01 | End: 2020-01-01 | Stop reason: HOSPADM

## 2020-01-01 RX ORDER — KETOROLAC TROMETHAMINE 30 MG/ML
15 INJECTION, SOLUTION INTRAMUSCULAR; INTRAVENOUS ONCE
Status: DISCONTINUED | OUTPATIENT
Start: 2020-01-01 | End: 2020-01-01

## 2020-01-01 RX ORDER — METOCLOPRAMIDE HYDROCHLORIDE 5 MG/ML
10 INJECTION INTRAMUSCULAR; INTRAVENOUS EVERY 6 HOURS PRN
Status: DISCONTINUED | OUTPATIENT
Start: 2020-01-01 | End: 2020-01-01 | Stop reason: HOSPADM

## 2020-01-01 RX ORDER — ONDANSETRON 2 MG/ML
4 INJECTION INTRAMUSCULAR; INTRAVENOUS ONCE
Status: COMPLETED | OUTPATIENT
Start: 2020-01-01 | End: 2020-01-01

## 2020-01-01 RX ORDER — FENTANYL CITRATE/PF 50 MCG/ML
50 SYRINGE (ML) INJECTION EVERY 2 HOUR PRN
Status: DISCONTINUED | OUTPATIENT
Start: 2020-01-01 | End: 2020-01-01 | Stop reason: HOSPADM

## 2020-01-01 RX ORDER — AMOXICILLIN AND CLAVULANATE POTASSIUM 500; 125 MG/1; MG/1
1 TABLET, FILM COATED ORAL EVERY 12 HOURS SCHEDULED
Status: DISCONTINUED | OUTPATIENT
Start: 2020-01-01 | End: 2020-01-01

## 2020-01-01 RX ORDER — PANTOPRAZOLE SODIUM 40 MG/1
40 TABLET, DELAYED RELEASE ORAL
Status: DISCONTINUED | OUTPATIENT
Start: 2020-01-01 | End: 2020-01-01

## 2020-01-01 RX ORDER — ALPRAZOLAM 0.5 MG/1
0.5 TABLET ORAL 2 TIMES DAILY PRN
Status: DISCONTINUED | OUTPATIENT
Start: 2020-01-01 | End: 2020-01-01

## 2020-01-01 RX ORDER — SENNOSIDES 8.6 MG
1 TABLET ORAL 2 TIMES DAILY
Status: DISCONTINUED | OUTPATIENT
Start: 2020-01-01 | End: 2020-01-01 | Stop reason: HOSPADM

## 2020-01-01 RX ORDER — CALCIUM GLUCONATE 20 MG/ML
1 INJECTION, SOLUTION INTRAVENOUS DAILY PRN
Status: DISCONTINUED | OUTPATIENT
Start: 2020-01-01 | End: 2020-01-01 | Stop reason: HOSPADM

## 2020-01-01 RX ORDER — DIPHENHYDRAMINE HCL 25 MG
25 TABLET ORAL EVERY 6 HOURS PRN
Status: DISCONTINUED | OUTPATIENT
Start: 2020-01-01 | End: 2020-01-01 | Stop reason: HOSPADM

## 2020-01-01 RX ORDER — POLYETHYLENE GLYCOL 3350 17 G/17G
17 POWDER, FOR SOLUTION ORAL DAILY
Status: CANCELLED | OUTPATIENT
Start: 2020-01-01

## 2020-01-01 RX ORDER — LEVETIRACETAM 500 MG/1
500 TABLET ORAL EVERY 12 HOURS SCHEDULED
Status: DISCONTINUED | OUTPATIENT
Start: 2020-01-01 | End: 2020-01-01

## 2020-01-01 RX ORDER — CALCIUM GLUCONATE 20 MG/ML
1 INJECTION, SOLUTION INTRAVENOUS ONCE
Status: DISCONTINUED | OUTPATIENT
Start: 2020-01-01 | End: 2020-01-01

## 2020-01-01 RX ORDER — ONDANSETRON 2 MG/ML
4 INJECTION INTRAMUSCULAR; INTRAVENOUS EVERY 6 HOURS PRN
Status: CANCELLED | OUTPATIENT
Start: 2020-01-01

## 2020-01-01 RX ORDER — CALCIUM GLUCONATE 20 MG/ML
2 INJECTION, SOLUTION INTRAVENOUS ONCE
Status: COMPLETED | OUTPATIENT
Start: 2020-01-01 | End: 2020-01-01

## 2020-01-01 RX ORDER — CALCIUM GLUCONATE 20 MG/ML
1 INJECTION, SOLUTION INTRAVENOUS DAILY
Status: COMPLETED | OUTPATIENT
Start: 2020-01-01 | End: 2020-01-01

## 2020-01-01 RX ORDER — ETOMIDATE 2 MG/ML
INJECTION INTRAVENOUS CODE/TRAUMA/SEDATION MEDICATION
Status: COMPLETED | OUTPATIENT
Start: 2020-01-01 | End: 2020-01-01

## 2020-01-01 RX ORDER — FENTANYL CITRATE-0.9 % NACL/PF 10 MCG/ML
75 PLASTIC BAG, INJECTION (ML) INTRAVENOUS CONTINUOUS
Status: DISCONTINUED | OUTPATIENT
Start: 2020-01-01 | End: 2020-01-01

## 2020-01-01 RX ORDER — LEVETIRACETAM 750 MG/1
750 TABLET ORAL EVERY 12 HOURS SCHEDULED
Status: DISCONTINUED | OUTPATIENT
Start: 2020-01-01 | End: 2020-01-01

## 2020-01-01 RX ORDER — HYDRALAZINE HYDROCHLORIDE 20 MG/ML
10 INJECTION INTRAMUSCULAR; INTRAVENOUS ONCE
Status: COMPLETED | OUTPATIENT
Start: 2020-01-01 | End: 2020-01-01

## 2020-01-01 RX ORDER — ACETAMINOPHEN 325 MG/1
650 TABLET ORAL ONCE
Status: DISCONTINUED | OUTPATIENT
Start: 2020-01-01 | End: 2020-01-01 | Stop reason: HOSPADM

## 2020-01-01 RX ORDER — ACETAMINOPHEN 325 MG/1
650 TABLET ORAL ONCE
Status: DISCONTINUED | OUTPATIENT
Start: 2020-01-01 | End: 2020-01-01

## 2020-01-01 RX ORDER — METOCLOPRAMIDE HYDROCHLORIDE 5 MG/ML
10 INJECTION INTRAMUSCULAR; INTRAVENOUS EVERY 6 HOURS PRN
Status: DISCONTINUED | OUTPATIENT
Start: 2020-01-01 | End: 2020-01-01

## 2020-01-01 RX ORDER — HYDRALAZINE HYDROCHLORIDE 20 MG/ML
10 INJECTION INTRAMUSCULAR; INTRAVENOUS EVERY 6 HOURS PRN
Status: DISCONTINUED | OUTPATIENT
Start: 2020-01-01 | End: 2020-01-01

## 2020-01-01 RX ORDER — SODIUM CHLORIDE 9 MG/ML
75 INJECTION, SOLUTION INTRAVENOUS CONTINUOUS
Status: DISCONTINUED | OUTPATIENT
Start: 2020-01-01 | End: 2020-01-01 | Stop reason: HOSPADM

## 2020-01-01 RX ORDER — EPINEPHRINE 0.1 MG/ML
SYRINGE (ML) INJECTION CODE/TRAUMA/SEDATION MEDICATION
Status: COMPLETED | OUTPATIENT
Start: 2020-01-01 | End: 2020-01-01

## 2020-01-01 RX ORDER — ANASTROZOLE 1 MG/1
1 TABLET ORAL DAILY
Status: DISCONTINUED | OUTPATIENT
Start: 2020-01-01 | End: 2020-01-01

## 2020-01-01 RX ORDER — LEVOTHYROXINE SODIUM 88 UG/1
88 TABLET ORAL DAILY
Qty: 90 TABLET | Refills: 1 | Status: SHIPPED | OUTPATIENT
Start: 2020-01-01

## 2020-01-01 RX ORDER — ATORVASTATIN CALCIUM 40 MG/1
40 TABLET, FILM COATED ORAL DAILY
Qty: 90 TABLET | Refills: 0 | Status: SHIPPED | OUTPATIENT
Start: 2020-01-01

## 2020-01-01 RX ORDER — ONDANSETRON 2 MG/ML
4 INJECTION INTRAMUSCULAR; INTRAVENOUS EVERY 6 HOURS PRN
Status: DISCONTINUED | OUTPATIENT
Start: 2020-01-01 | End: 2020-01-01 | Stop reason: HOSPADM

## 2020-01-01 RX ORDER — FOLIC ACID 1 MG/1
1 TABLET ORAL DAILY
Status: DISCONTINUED | OUTPATIENT
Start: 2020-01-01 | End: 2020-01-01 | Stop reason: HOSPADM

## 2020-01-01 RX ORDER — VANCOMYCIN HYDROCHLORIDE 1 G/200ML
15 INJECTION, SOLUTION INTRAVENOUS DAILY PRN
Status: DISCONTINUED | OUTPATIENT
Start: 2020-01-01 | End: 2020-01-01

## 2020-01-01 RX ORDER — METOCLOPRAMIDE HYDROCHLORIDE 5 MG/ML
10 INJECTION INTRAMUSCULAR; INTRAVENOUS ONCE
Status: COMPLETED | OUTPATIENT
Start: 2020-01-01 | End: 2020-01-01

## 2020-01-01 RX ORDER — PROPOFOL 10 MG/ML
5-50 INJECTION, EMULSION INTRAVENOUS
Status: CANCELLED | OUTPATIENT
Start: 2020-01-01

## 2020-01-01 RX ORDER — LORAZEPAM 2 MG/ML
2 INJECTION INTRAMUSCULAR EVERY 4 HOURS PRN
Status: CANCELLED | OUTPATIENT
Start: 2020-01-01

## 2020-01-01 RX ORDER — LIDOCAINE WITH 8.4% SOD BICARB 0.9%(10ML)
SYRINGE (ML) INJECTION CODE/TRAUMA/SEDATION MEDICATION
Status: COMPLETED | OUTPATIENT
Start: 2020-01-01 | End: 2020-01-01

## 2020-01-01 RX ORDER — FAMOTIDINE 20 MG/1
20 TABLET, FILM COATED ORAL DAILY
Status: DISCONTINUED | OUTPATIENT
Start: 2020-01-01 | End: 2020-01-01

## 2020-01-01 RX ORDER — AMOXICILLIN 500 MG/1
500 CAPSULE ORAL EVERY 12 HOURS SCHEDULED
Status: DISCONTINUED | OUTPATIENT
Start: 2020-01-01 | End: 2020-01-01

## 2020-01-01 RX ORDER — FOLIC ACID 1 MG/1
1 TABLET ORAL DAILY
Status: CANCELLED | OUTPATIENT
Start: 2020-01-01

## 2020-01-01 RX ORDER — HYDRALAZINE HYDROCHLORIDE 20 MG/ML
25 INJECTION INTRAMUSCULAR; INTRAVENOUS EVERY 12 HOURS PRN
Status: DISCONTINUED | OUTPATIENT
Start: 2020-01-01 | End: 2020-01-01

## 2020-01-01 RX ORDER — DIPHENHYDRAMINE HCL 25 MG
25 TABLET ORAL ONCE
Status: DISCONTINUED | OUTPATIENT
Start: 2020-01-01 | End: 2020-01-01

## 2020-01-01 RX ORDER — DIPHENHYDRAMINE HYDROCHLORIDE 50 MG/ML
25 INJECTION INTRAMUSCULAR; INTRAVENOUS ONCE
Status: COMPLETED | OUTPATIENT
Start: 2020-01-01 | End: 2020-01-01

## 2020-01-01 RX ORDER — ACETAMINOPHEN 325 MG/1
650 TABLET ORAL EVERY 8 HOURS PRN
Status: CANCELLED | OUTPATIENT
Start: 2020-01-01

## 2020-01-01 RX ORDER — ANASTROZOLE 1 MG/1
1 TABLET ORAL DAILY
Status: DISCONTINUED | OUTPATIENT
Start: 2020-01-01 | End: 2020-01-01 | Stop reason: HOSPADM

## 2020-01-01 RX ORDER — SODIUM CHLORIDE 9 MG/ML
125 INJECTION, SOLUTION INTRAVENOUS CONTINUOUS
Status: DISCONTINUED | OUTPATIENT
Start: 2020-01-01 | End: 2020-01-01

## 2020-01-01 RX ORDER — LABETALOL 20 MG/4 ML (5 MG/ML) INTRAVENOUS SYRINGE
10 EVERY 4 HOURS PRN
Status: DISCONTINUED | OUTPATIENT
Start: 2020-01-01 | End: 2020-01-01 | Stop reason: HOSPADM

## 2020-01-01 RX ORDER — LIDOCAINE HYDROCHLORIDE 20 MG/ML
15 SOLUTION OROPHARYNGEAL
Status: DISCONTINUED | OUTPATIENT
Start: 2020-01-01 | End: 2020-01-01 | Stop reason: HOSPADM

## 2020-01-01 RX ORDER — MIDAZOLAM HYDROCHLORIDE 2 MG/2ML
INJECTION, SOLUTION INTRAMUSCULAR; INTRAVENOUS CODE/TRAUMA/SEDATION MEDICATION
Status: COMPLETED | OUTPATIENT
Start: 2020-01-01 | End: 2020-01-01

## 2020-01-01 RX ORDER — METHYLPREDNISOLONE SODIUM SUCCINATE 40 MG/ML
20 INJECTION, POWDER, LYOPHILIZED, FOR SOLUTION INTRAMUSCULAR; INTRAVENOUS 2 TIMES DAILY
Status: DISCONTINUED | OUTPATIENT
Start: 2020-01-01 | End: 2020-01-01

## 2020-01-01 RX ORDER — LIDOCAINE HYDROCHLORIDE AND EPINEPHRINE 10; 10 MG/ML; UG/ML
INJECTION, SOLUTION INFILTRATION; PERINEURAL CODE/TRAUMA/SEDATION MEDICATION
Status: COMPLETED | OUTPATIENT
Start: 2020-01-01 | End: 2020-01-01

## 2020-01-01 RX ORDER — BUPROPION HYDROCHLORIDE 300 MG/1
300 TABLET ORAL DAILY
Qty: 90 TABLET | Refills: 0 | Status: SHIPPED | OUTPATIENT
Start: 2020-01-01 | End: 2020-01-01 | Stop reason: SDUPTHER

## 2020-01-01 RX ORDER — METHYLPREDNISOLONE SODIUM SUCCINATE 40 MG/ML
20 INJECTION, POWDER, LYOPHILIZED, FOR SOLUTION INTRAMUSCULAR; INTRAVENOUS 2 TIMES DAILY
Status: CANCELLED | OUTPATIENT
Start: 2020-01-01

## 2020-01-01 RX ORDER — FENTANYL CITRATE/PF 50 MCG/ML
50 SYRINGE (ML) INJECTION EVERY 2 HOUR PRN
Status: CANCELLED | OUTPATIENT
Start: 2020-01-01

## 2020-01-01 RX ORDER — LANCING DEVICE
EACH MISCELLANEOUS
Qty: 100 EACH | Refills: 5 | OUTPATIENT
Start: 2020-01-01

## 2020-01-01 RX ORDER — FENTANYL CITRATE/PF 50 MCG/ML
50 SYRINGE (ML) INJECTION EVERY 2 HOUR PRN
Status: DISCONTINUED | OUTPATIENT
Start: 2020-01-01 | End: 2020-01-01

## 2020-01-01 RX ORDER — AMLODIPINE BESYLATE 10 MG/1
10 TABLET ORAL DAILY
Status: DISCONTINUED | OUTPATIENT
Start: 2020-01-01 | End: 2020-01-01

## 2020-01-01 RX ORDER — SODIUM BICARBONATE 84 MG/ML
INJECTION, SOLUTION INTRAVENOUS CODE/TRAUMA/SEDATION MEDICATION
Status: COMPLETED | OUTPATIENT
Start: 2020-01-01 | End: 2020-01-01

## 2020-01-01 RX ORDER — HYDRALAZINE HYDROCHLORIDE 20 MG/ML
10 INJECTION INTRAMUSCULAR; INTRAVENOUS EVERY 4 HOURS PRN
Status: DISCONTINUED | OUTPATIENT
Start: 2020-01-01 | End: 2020-01-01

## 2020-01-01 RX ORDER — ACETAMINOPHEN 325 MG/1
650 TABLET ORAL EVERY 8 HOURS PRN
Status: DISCONTINUED | OUTPATIENT
Start: 2020-01-01 | End: 2020-01-01 | Stop reason: HOSPADM

## 2020-01-01 RX ORDER — SODIUM CHLORIDE 9 MG/ML
75 INJECTION, SOLUTION INTRAVENOUS CONTINUOUS
Status: CANCELLED | OUTPATIENT
Start: 2020-01-01 | End: 2020-01-01

## 2020-01-01 RX ORDER — PROPOFOL 10 MG/ML
INJECTION, EMULSION INTRAVENOUS
Status: COMPLETED
Start: 2020-01-01 | End: 2020-01-01

## 2020-01-01 RX ORDER — LORAZEPAM 2 MG/ML
0.5 INJECTION INTRAMUSCULAR ONCE
Status: COMPLETED | OUTPATIENT
Start: 2020-01-01 | End: 2020-01-01

## 2020-01-01 RX ORDER — LABETALOL 20 MG/4 ML (5 MG/ML) INTRAVENOUS SYRINGE
Status: COMPLETED
Start: 2020-01-01 | End: 2020-01-01

## 2020-01-01 RX ORDER — ANASTROZOLE 1 MG/1
1 TABLET ORAL DAILY
Qty: 90 TABLET | Refills: 1 | Status: SHIPPED | OUTPATIENT
Start: 2020-01-01

## 2020-01-01 RX ORDER — HEPARIN SODIUM,PORCINE 10 UNIT/ML
1000 VIAL (ML) INTRAVENOUS 2 TIMES DAILY PRN
Status: CANCELLED | OUTPATIENT
Start: 2020-01-01

## 2020-01-01 RX ORDER — SODIUM CHLORIDE 450 MG/100ML
50 INJECTION, SOLUTION INTRAVENOUS CONTINUOUS
Status: DISCONTINUED | OUTPATIENT
Start: 2020-01-01 | End: 2020-01-01

## 2020-01-01 RX ORDER — SENNOSIDES 8.6 MG
1 TABLET ORAL 2 TIMES DAILY
Status: CANCELLED | OUTPATIENT
Start: 2020-01-01

## 2020-01-01 RX ORDER — POLYETHYLENE GLYCOL 3350 17 G/17G
17 POWDER, FOR SOLUTION ORAL DAILY
Status: DISCONTINUED | OUTPATIENT
Start: 2020-01-01 | End: 2020-01-01 | Stop reason: HOSPADM

## 2020-01-01 RX ORDER — CALCIUM CHLORIDE 100 MG/ML
SYRINGE (ML) INTRAVENOUS CODE/TRAUMA/SEDATION MEDICATION
Status: COMPLETED | OUTPATIENT
Start: 2020-01-01 | End: 2020-01-01

## 2020-01-01 RX ORDER — HYDRALAZINE HYDROCHLORIDE 20 MG/ML
10 INJECTION INTRAMUSCULAR; INTRAVENOUS EVERY 8 HOURS
Status: DISCONTINUED | OUTPATIENT
Start: 2020-01-01 | End: 2020-01-01 | Stop reason: HOSPADM

## 2020-01-01 RX ORDER — ACETAMINOPHEN 325 MG/1
650 TABLET ORAL EVERY 6 HOURS PRN
Status: DISCONTINUED | OUTPATIENT
Start: 2020-01-01 | End: 2020-01-01

## 2020-01-01 RX ORDER — METHYLPREDNISOLONE SODIUM SUCCINATE 40 MG/ML
20 INJECTION, POWDER, LYOPHILIZED, FOR SOLUTION INTRAMUSCULAR; INTRAVENOUS 2 TIMES DAILY
Status: DISCONTINUED | OUTPATIENT
Start: 2020-01-01 | End: 2020-01-01 | Stop reason: HOSPADM

## 2020-01-01 RX ORDER — LISINOPRIL 10 MG/1
10 TABLET ORAL
Qty: 90 TABLET | Refills: 0 | Status: SHIPPED | OUTPATIENT
Start: 2020-01-01

## 2020-01-01 RX ORDER — HYDROXYZINE HYDROCHLORIDE 25 MG/1
25 TABLET, FILM COATED ORAL ONCE
Status: COMPLETED | OUTPATIENT
Start: 2020-01-01 | End: 2020-01-01

## 2020-01-01 RX ORDER — HYDRALAZINE HYDROCHLORIDE 25 MG/1
25 TABLET, FILM COATED ORAL EVERY 8 HOURS SCHEDULED
Status: DISCONTINUED | OUTPATIENT
Start: 2020-01-01 | End: 2020-01-01

## 2020-01-01 RX ORDER — HYDRALAZINE HYDROCHLORIDE 20 MG/ML
25 INJECTION INTRAMUSCULAR; INTRAVENOUS EVERY 8 HOURS
Status: DISCONTINUED | OUTPATIENT
Start: 2020-01-01 | End: 2020-01-01

## 2020-01-01 RX ORDER — HYDRALAZINE HYDROCHLORIDE 20 MG/ML
25 INJECTION INTRAMUSCULAR; INTRAVENOUS EVERY 12 HOURS
Status: DISCONTINUED | OUTPATIENT
Start: 2020-01-01 | End: 2020-01-01

## 2020-01-01 RX ORDER — PANTOPRAZOLE SODIUM 40 MG/1
40 TABLET, DELAYED RELEASE ORAL
Status: CANCELLED | OUTPATIENT
Start: 2020-01-01

## 2020-01-01 RX ORDER — METOCLOPRAMIDE HYDROCHLORIDE 5 MG/ML
10 INJECTION INTRAMUSCULAR; INTRAVENOUS EVERY 6 HOURS PRN
Status: CANCELLED | OUTPATIENT
Start: 2020-01-01

## 2020-01-01 RX ORDER — BLOOD SUGAR DIAGNOSTIC
STRIP MISCELLANEOUS
Qty: 100 EACH | Refills: 5 | Status: SHIPPED | OUTPATIENT
Start: 2020-01-01 | End: 2020-01-01

## 2020-01-01 RX ORDER — LORAZEPAM 2 MG/ML
2 INJECTION INTRAMUSCULAR EVERY 4 HOURS PRN
Status: DISCONTINUED | OUTPATIENT
Start: 2020-01-01 | End: 2020-01-01

## 2020-01-01 RX ORDER — HYDRALAZINE HYDROCHLORIDE 20 MG/ML
5 INJECTION INTRAMUSCULAR; INTRAVENOUS EVERY 6 HOURS PRN
Status: DISCONTINUED | OUTPATIENT
Start: 2020-01-01 | End: 2020-01-01

## 2020-01-01 RX ORDER — LISINOPRIL 10 MG/1
10 TABLET ORAL
Qty: 90 TABLET | Refills: 0 | Status: SHIPPED | OUTPATIENT
Start: 2020-01-01 | End: 2020-01-01 | Stop reason: SDUPTHER

## 2020-01-01 RX ORDER — METHYLPREDNISOLONE SODIUM SUCCINATE 40 MG/ML
40 INJECTION, POWDER, LYOPHILIZED, FOR SOLUTION INTRAMUSCULAR; INTRAVENOUS 2 TIMES DAILY
Status: DISCONTINUED | OUTPATIENT
Start: 2020-01-01 | End: 2020-01-01

## 2020-01-01 RX ORDER — LANOLIN ALCOHOL/MO/W.PET/CERES
3 CREAM (GRAM) TOPICAL
Status: DISCONTINUED | OUTPATIENT
Start: 2020-01-01 | End: 2020-01-01 | Stop reason: HOSPADM

## 2020-01-01 RX ORDER — CHLORHEXIDINE GLUCONATE 0.12 MG/ML
15 RINSE ORAL EVERY 12 HOURS SCHEDULED
Status: DISCONTINUED | OUTPATIENT
Start: 2020-01-01 | End: 2020-01-01 | Stop reason: HOSPADM

## 2020-01-01 RX ORDER — LABETALOL 20 MG/4 ML (5 MG/ML) INTRAVENOUS SYRINGE
10 EVERY 4 HOURS PRN
Status: DISCONTINUED | OUTPATIENT
Start: 2020-01-01 | End: 2020-01-01

## 2020-01-01 RX ORDER — DIPHENHYDRAMINE HYDROCHLORIDE 50 MG/ML
INJECTION INTRAMUSCULAR; INTRAVENOUS CODE/TRAUMA/SEDATION MEDICATION
Status: COMPLETED | OUTPATIENT
Start: 2020-01-01 | End: 2020-01-01

## 2020-01-01 RX ADMIN — METHYLPREDNISOLONE SODIUM SUCCINATE 40 MG: 40 INJECTION, POWDER, FOR SOLUTION INTRAMUSCULAR; INTRAVENOUS at 11:41

## 2020-01-01 RX ADMIN — LEVOTHYROXINE SODIUM 88 MCG: 88 TABLET ORAL at 05:35

## 2020-01-01 RX ADMIN — INSULIN LISPRO 1 UNITS: 100 INJECTION, SOLUTION INTRAVENOUS; SUBCUTANEOUS at 17:19

## 2020-01-01 RX ADMIN — FAMOTIDINE 20 MG: 20 TABLET ORAL at 08:33

## 2020-01-01 RX ADMIN — FOLIC ACID 1 MG: 1 TABLET ORAL at 08:14

## 2020-01-01 RX ADMIN — LABETALOL 20 MG/4 ML (5 MG/ML) INTRAVENOUS SYRINGE 10 MG: at 04:55

## 2020-01-01 RX ADMIN — HEPARIN SODIUM 5000 UNITS: 5000 INJECTION INTRAVENOUS; SUBCUTANEOUS at 05:21

## 2020-01-01 RX ADMIN — SODIUM CHLORIDE, SODIUM GLUCONATE, SODIUM ACETATE, POTASSIUM CHLORIDE, MAGNESIUM CHLORIDE, SODIUM PHOSPHATE, DIBASIC, AND POTASSIUM PHOSPHATE 50 ML/HR: .53; .5; .37; .037; .03; .012; .00082 INJECTION, SOLUTION INTRAVENOUS at 12:24

## 2020-01-01 RX ADMIN — SODIUM CHLORIDE, SODIUM GLUCONATE, SODIUM ACETATE, POTASSIUM CHLORIDE, MAGNESIUM CHLORIDE, SODIUM PHOSPHATE, DIBASIC, AND POTASSIUM PHOSPHATE 50 ML/HR: .53; .5; .37; .037; .03; .012; .00082 INJECTION, SOLUTION INTRAVENOUS at 23:37

## 2020-01-01 RX ADMIN — INSULIN LISPRO 2 UNITS: 100 INJECTION, SOLUTION INTRAVENOUS; SUBCUTANEOUS at 12:05

## 2020-01-01 RX ADMIN — HYDRALAZINE HYDROCHLORIDE 25 MG: 25 TABLET ORAL at 05:37

## 2020-01-01 RX ADMIN — METOCLOPRAMIDE 10 MG: 5 INJECTION, SOLUTION INTRAMUSCULAR; INTRAVENOUS at 18:34

## 2020-01-01 RX ADMIN — ONDANSETRON 4 MG: 2 INJECTION INTRAMUSCULAR; INTRAVENOUS at 09:52

## 2020-01-01 RX ADMIN — IODIXANOL 85 ML: 320 INJECTION, SOLUTION INTRAVASCULAR at 08:23

## 2020-01-01 RX ADMIN — LIDOCAINE HYDROCHLORIDE 15 ML: 20 SOLUTION ORAL; TOPICAL at 10:18

## 2020-01-01 RX ADMIN — HYDRALAZINE HYDROCHLORIDE 5 MG: 20 INJECTION INTRAMUSCULAR; INTRAVENOUS at 22:36

## 2020-01-01 RX ADMIN — SODIUM BICARBONATE 50 MEQ: 84 INJECTION INTRAVENOUS at 22:50

## 2020-01-01 RX ADMIN — CHLORHEXIDINE GLUCONATE 0.12% ORAL RINSE 15 ML: 1.2 LIQUID ORAL at 10:08

## 2020-01-01 RX ADMIN — CAPLACIZUMAB 11 MG: KIT at 14:09

## 2020-01-01 RX ADMIN — LIDOCAINE HYDROCHLORIDE 15 ML: 20 SOLUTION ORAL; TOPICAL at 12:14

## 2020-01-01 RX ADMIN — CAPLACIZUMAB 11 MG: KIT at 15:11

## 2020-01-01 RX ADMIN — INSULIN LISPRO 1 UNITS: 100 INJECTION, SOLUTION INTRAVENOUS; SUBCUTANEOUS at 18:00

## 2020-01-01 RX ADMIN — HEPARIN SODIUM 5000 UNITS: 5000 INJECTION INTRAVENOUS; SUBCUTANEOUS at 22:01

## 2020-01-01 RX ADMIN — ONDANSETRON 4 MG: 2 INJECTION INTRAMUSCULAR; INTRAVENOUS at 12:48

## 2020-01-01 RX ADMIN — SENNOSIDES 8.6 MG: 8.6 TABLET, FILM COATED ORAL at 08:43

## 2020-01-01 RX ADMIN — CHLORHEXIDINE GLUCONATE 0.12% ORAL RINSE 15 ML: 1.2 LIQUID ORAL at 08:46

## 2020-01-01 RX ADMIN — INSULIN LISPRO 1 UNITS: 100 INJECTION, SOLUTION INTRAVENOUS; SUBCUTANEOUS at 11:12

## 2020-01-01 RX ADMIN — INSULIN LISPRO 1 UNITS: 100 INJECTION, SOLUTION INTRAVENOUS; SUBCUTANEOUS at 17:40

## 2020-01-01 RX ADMIN — METHYLPREDNISOLONE SODIUM SUCCINATE 20 MG: 40 INJECTION, POWDER, FOR SOLUTION INTRAMUSCULAR; INTRAVENOUS at 17:38

## 2020-01-01 RX ADMIN — DIPHENHYDRAMINE HYDROCHLORIDE 25 MG: 50 INJECTION INTRAMUSCULAR; INTRAVENOUS at 14:25

## 2020-01-01 RX ADMIN — LEVOTHYROXINE SODIUM 88 MCG: 88 TABLET ORAL at 07:39

## 2020-01-01 RX ADMIN — INSULIN LISPRO 2 UNITS: 100 INJECTION, SOLUTION INTRAVENOUS; SUBCUTANEOUS at 18:28

## 2020-01-01 RX ADMIN — INSULIN LISPRO 2 UNITS: 100 INJECTION, SOLUTION INTRAVENOUS; SUBCUTANEOUS at 00:58

## 2020-01-01 RX ADMIN — MIDAZOLAM 0.5 MG: 1 INJECTION INTRAMUSCULAR; INTRAVENOUS at 09:30

## 2020-01-01 RX ADMIN — SENNOSIDES 8.6 MG: 8.6 TABLET, FILM COATED ORAL at 18:02

## 2020-01-01 RX ADMIN — HEPARIN SODIUM 5000 UNITS: 5000 INJECTION INTRAVENOUS; SUBCUTANEOUS at 14:48

## 2020-01-01 RX ADMIN — LEVOTHYROXINE SODIUM 88 MCG: 88 TABLET ORAL at 05:02

## 2020-01-01 RX ADMIN — ACETAMINOPHEN 650 MG: 325 TABLET ORAL at 00:49

## 2020-01-01 RX ADMIN — CALCIUM GLUCONATE 1 G: 20 INJECTION, SOLUTION INTRAVENOUS at 08:13

## 2020-01-01 RX ADMIN — LEVETIRACETAM 500 MG: 500 TABLET, FILM COATED ORAL at 22:24

## 2020-01-01 RX ADMIN — LEVETIRACETAM 500 MG: 500 TABLET, FILM COATED ORAL at 21:58

## 2020-01-01 RX ADMIN — LABETALOL 20 MG/4 ML (5 MG/ML) INTRAVENOUS SYRINGE 10 MG: at 00:46

## 2020-01-01 RX ADMIN — NYSTATIN 500000 UNITS: 100000 SUSPENSION ORAL at 23:20

## 2020-01-01 RX ADMIN — LEVETIRACETAM 750 MG: 100 INJECTION, SOLUTION INTRAVENOUS at 09:45

## 2020-01-01 RX ADMIN — FOLIC ACID 1 MG: 1 TABLET ORAL at 08:06

## 2020-01-01 RX ADMIN — CHLORHEXIDINE GLUCONATE 0.12% ORAL RINSE 15 ML: 1.2 LIQUID ORAL at 20:32

## 2020-01-01 RX ADMIN — CHLORHEXIDINE GLUCONATE 0.12% ORAL RINSE 15 ML: 1.2 LIQUID ORAL at 22:24

## 2020-01-01 RX ADMIN — AMLODIPINE BESYLATE 10 MG: 10 TABLET ORAL at 09:55

## 2020-01-01 RX ADMIN — LEVETIRACETAM 500 MG: 500 TABLET, FILM COATED ORAL at 08:46

## 2020-01-01 RX ADMIN — CHLORHEXIDINE GLUCONATE 0.12% ORAL RINSE 15 ML: 1.2 LIQUID ORAL at 21:49

## 2020-01-01 RX ADMIN — LABETALOL 20 MG/4 ML (5 MG/ML) INTRAVENOUS SYRINGE 10 MG: at 02:35

## 2020-01-01 RX ADMIN — HYDRALAZINE HYDROCHLORIDE 25 MG: 25 TABLET ORAL at 14:05

## 2020-01-01 RX ADMIN — CHLORHEXIDINE GLUCONATE 0.12% ORAL RINSE 15 ML: 1.2 LIQUID ORAL at 21:44

## 2020-01-01 RX ADMIN — METOCLOPRAMIDE 10 MG: 5 INJECTION, SOLUTION INTRAMUSCULAR; INTRAVENOUS at 22:08

## 2020-01-01 RX ADMIN — INSULIN LISPRO 2 UNITS: 100 INJECTION, SOLUTION INTRAVENOUS; SUBCUTANEOUS at 00:54

## 2020-01-01 RX ADMIN — CAPLACIZUMAB 11 MG: KIT at 18:36

## 2020-01-01 RX ADMIN — HYDRALAZINE HYDROCHLORIDE 25 MG: 25 TABLET ORAL at 21:42

## 2020-01-01 RX ADMIN — DIPHENHYDRAMINE HCL 25 MG: 25 TABLET ORAL at 09:19

## 2020-01-01 RX ADMIN — ACETAMINOPHEN 650 MG: 325 TABLET ORAL at 01:02

## 2020-01-01 RX ADMIN — SENNOSIDES 8.6 MG: 8.6 TABLET, FILM COATED ORAL at 09:55

## 2020-01-01 RX ADMIN — NYSTATIN 500000 UNITS: 100000 SUSPENSION ORAL at 13:42

## 2020-01-01 RX ADMIN — SODIUM CHLORIDE 50 ML/HR: 0.45 INJECTION, SOLUTION INTRAVENOUS at 14:02

## 2020-01-01 RX ADMIN — LIDOCAINE HYDROCHLORIDE 15 ML: 20 SOLUTION ORAL; TOPICAL at 10:52

## 2020-01-01 RX ADMIN — HYDRALAZINE HYDROCHLORIDE 25 MG: 25 TABLET ORAL at 06:37

## 2020-01-01 RX ADMIN — CAPLACIZUMAB 11 MG: KIT at 17:46

## 2020-01-01 RX ADMIN — LEVOTHYROXINE SODIUM 88 MCG: 88 TABLET ORAL at 06:28

## 2020-01-01 RX ADMIN — VANCOMYCIN HYDROCHLORIDE 750 MG: 750 INJECTION, SOLUTION INTRAVENOUS at 05:00

## 2020-01-01 RX ADMIN — POLYETHYLENE GLYCOL 3350 17 G: 17 POWDER, FOR SOLUTION ORAL at 11:40

## 2020-01-01 RX ADMIN — SENNOSIDES 8.6 MG: 8.6 TABLET, FILM COATED ORAL at 09:30

## 2020-01-01 RX ADMIN — CEFTRIAXONE SODIUM 2000 MG: 10 INJECTION, POWDER, FOR SOLUTION INTRAVENOUS at 14:20

## 2020-01-01 RX ADMIN — CALCIUM GLUCONATE 1 G: 20 INJECTION, SOLUTION INTRAVENOUS at 08:32

## 2020-01-01 RX ADMIN — CHLORHEXIDINE GLUCONATE 0.12% ORAL RINSE 15 ML: 1.2 LIQUID ORAL at 21:45

## 2020-01-01 RX ADMIN — HEPARIN SODIUM 5000 UNITS: 5000 INJECTION INTRAVENOUS; SUBCUTANEOUS at 14:02

## 2020-01-01 RX ADMIN — AMOXICILLIN 500 MG: 500 CAPSULE ORAL at 09:19

## 2020-01-01 RX ADMIN — CALCIUM GLUCONATE 1 G: 20 INJECTION, SOLUTION INTRAVENOUS at 09:29

## 2020-01-01 RX ADMIN — METOCLOPRAMIDE 10 MG: 5 INJECTION, SOLUTION INTRAMUSCULAR; INTRAVENOUS at 11:41

## 2020-01-01 RX ADMIN — HYDRALAZINE HYDROCHLORIDE 25 MG: 25 TABLET ORAL at 06:34

## 2020-01-01 RX ADMIN — CHLORHEXIDINE GLUCONATE 0.12% ORAL RINSE 15 ML: 1.2 LIQUID ORAL at 23:19

## 2020-01-01 RX ADMIN — METHYLPREDNISOLONE SODIUM SUCCINATE 20 MG: 40 INJECTION, POWDER, FOR SOLUTION INTRAMUSCULAR; INTRAVENOUS at 08:16

## 2020-01-01 RX ADMIN — FOLIC ACID 1 MG: 1 TABLET ORAL at 09:30

## 2020-01-01 RX ADMIN — METHYLPREDNISOLONE SODIUM SUCCINATE 20 MG: 40 INJECTION, POWDER, FOR SOLUTION INTRAMUSCULAR; INTRAVENOUS at 08:46

## 2020-01-01 RX ADMIN — LEVETIRACETAM 750 MG: 100 INJECTION, SOLUTION INTRAVENOUS at 21:25

## 2020-01-01 RX ADMIN — CAPLACIZUMAB 11 MG: KIT at 15:56

## 2020-01-01 RX ADMIN — LORAZEPAM 2 MG: 2 INJECTION, SOLUTION INTRAMUSCULAR; INTRAVENOUS at 19:38

## 2020-01-01 RX ADMIN — CEFTRIAXONE SODIUM 2000 MG: 10 INJECTION, POWDER, FOR SOLUTION INTRAVENOUS at 13:17

## 2020-01-01 RX ADMIN — CALCIUM GLUCONATE 1 G: 20 INJECTION, SOLUTION INTRAVENOUS at 14:17

## 2020-01-01 RX ADMIN — HEPARIN SODIUM 5000 UNITS: 5000 INJECTION INTRAVENOUS; SUBCUTANEOUS at 21:12

## 2020-01-01 RX ADMIN — POLYETHYLENE GLYCOL 3350 17 G: 17 POWDER, FOR SOLUTION ORAL at 08:53

## 2020-01-01 RX ADMIN — AMLODIPINE BESYLATE 10 MG: 10 TABLET ORAL at 10:51

## 2020-01-01 RX ADMIN — VANCOMYCIN HYDROCHLORIDE 750 MG: 750 INJECTION, SOLUTION INTRAVENOUS at 08:00

## 2020-01-01 RX ADMIN — FENTANYL CITRATE 25 MCG: 50 INJECTION, SOLUTION INTRAMUSCULAR; INTRAVENOUS at 09:43

## 2020-01-01 RX ADMIN — ACETAMINOPHEN 650 MG: 325 TABLET ORAL at 02:30

## 2020-01-01 RX ADMIN — CEFTRIAXONE SODIUM 1000 MG: 1 INJECTION, POWDER, FOR SOLUTION INTRAMUSCULAR; INTRAVENOUS at 14:08

## 2020-01-01 RX ADMIN — Medication 10 ML: at 09:43

## 2020-01-01 RX ADMIN — ACETAMINOPHEN 650 MG: 325 TABLET ORAL at 08:44

## 2020-01-01 RX ADMIN — CALCIUM GLUCONATE 1 G: 20 INJECTION, SOLUTION INTRAVENOUS at 11:14

## 2020-01-01 RX ADMIN — LEVOTHYROXINE SODIUM 88 MCG: 88 TABLET ORAL at 05:22

## 2020-01-01 RX ADMIN — CHLORHEXIDINE GLUCONATE 0.12% ORAL RINSE 15 ML: 1.2 LIQUID ORAL at 21:48

## 2020-01-01 RX ADMIN — HYDRALAZINE HYDROCHLORIDE 25 MG: 25 TABLET ORAL at 22:20

## 2020-01-01 RX ADMIN — HYDRALAZINE HYDROCHLORIDE 25 MG: 25 TABLET ORAL at 06:28

## 2020-01-01 RX ADMIN — SODIUM CHLORIDE, SODIUM GLUCONATE, SODIUM ACETATE, POTASSIUM CHLORIDE, MAGNESIUM CHLORIDE, SODIUM PHOSPHATE, DIBASIC, AND POTASSIUM PHOSPHATE 50 ML/HR: .53; .5; .37; .037; .03; .012; .00082 INJECTION, SOLUTION INTRAVENOUS at 05:31

## 2020-01-01 RX ADMIN — ALPRAZOLAM 0.5 MG: 0.5 TABLET ORAL at 13:02

## 2020-01-01 RX ADMIN — LEVETIRACETAM 500 MG: 500 TABLET, FILM COATED ORAL at 21:50

## 2020-01-01 RX ADMIN — LEVETIRACETAM 500 MG: 500 TABLET, FILM COATED ORAL at 22:20

## 2020-01-01 RX ADMIN — FAMOTIDINE 20 MG: 20 TABLET ORAL at 08:20

## 2020-01-01 RX ADMIN — ACETAMINOPHEN 650 MG: 325 TABLET ORAL at 16:47

## 2020-01-01 RX ADMIN — NYSTATIN 500000 UNITS: 100000 SUSPENSION ORAL at 17:18

## 2020-01-01 RX ADMIN — CHLORHEXIDINE GLUCONATE 0.12% ORAL RINSE 15 ML: 1.2 LIQUID ORAL at 08:14

## 2020-01-01 RX ADMIN — CAPLACIZUMAB 11 MG: KIT at 15:09

## 2020-01-01 RX ADMIN — SENNOSIDES 8.6 MG: 8.6 TABLET, FILM COATED ORAL at 18:07

## 2020-01-01 RX ADMIN — FAMOTIDINE 20 MG: 20 TABLET ORAL at 07:36

## 2020-01-01 RX ADMIN — DIPHENHYDRAMINE HCL 25 MG: 25 TABLET ORAL at 14:21

## 2020-01-01 RX ADMIN — HYDROXYZINE HYDROCHLORIDE 25 MG: 25 TABLET ORAL at 22:59

## 2020-01-01 RX ADMIN — AMLODIPINE BESYLATE 10 MG: 10 TABLET ORAL at 08:14

## 2020-01-01 RX ADMIN — METHYLPREDNISOLONE SODIUM SUCCINATE 20 MG: 40 INJECTION, POWDER, FOR SOLUTION INTRAMUSCULAR; INTRAVENOUS at 10:09

## 2020-01-01 RX ADMIN — CHLORHEXIDINE GLUCONATE 0.12% ORAL RINSE 15 ML: 1.2 LIQUID ORAL at 08:05

## 2020-01-01 RX ADMIN — NYSTATIN 500000 UNITS: 100000 SUSPENSION ORAL at 08:44

## 2020-01-01 RX ADMIN — METHYLPREDNISOLONE SODIUM SUCCINATE 20 MG: 40 INJECTION, POWDER, FOR SOLUTION INTRAMUSCULAR; INTRAVENOUS at 17:03

## 2020-01-01 RX ADMIN — METRONIDAZOLE 500 MG: 500 INJECTION, SOLUTION INTRAVENOUS at 04:23

## 2020-01-01 RX ADMIN — NYSTATIN 500000 UNITS: 100000 SUSPENSION ORAL at 21:42

## 2020-01-01 RX ADMIN — DIPHENHYDRAMINE HCL 25 MG: 25 TABLET ORAL at 14:27

## 2020-01-01 RX ADMIN — METHYLPREDNISOLONE SODIUM SUCCINATE 20 MG: 40 INJECTION, POWDER, FOR SOLUTION INTRAMUSCULAR; INTRAVENOUS at 08:33

## 2020-01-01 RX ADMIN — MELATONIN 3 MG: at 21:45

## 2020-01-01 RX ADMIN — CAPLACIZUMAB 11 MG: KIT at 16:06

## 2020-01-01 RX ADMIN — FAMOTIDINE 20 MG: 20 TABLET ORAL at 10:39

## 2020-01-01 RX ADMIN — DIPHENHYDRAMINE HCL 25 MG: 25 TABLET ORAL at 15:55

## 2020-01-01 RX ADMIN — CALCIUM GLUCONATE 2 G: 20 INJECTION, SOLUTION INTRAVENOUS at 13:31

## 2020-01-01 RX ADMIN — MELATONIN 3 MG: at 21:58

## 2020-01-01 RX ADMIN — LEVOTHYROXINE SODIUM 88 MCG: 88 TABLET ORAL at 05:26

## 2020-01-01 RX ADMIN — HYDRALAZINE HYDROCHLORIDE 25 MG: 25 TABLET ORAL at 23:20

## 2020-01-01 RX ADMIN — DEXTRAN 70 AND HYPROMELLOSE 2910 1 DROP: 1; 3 SOLUTION/ DROPS OPHTHALMIC at 21:42

## 2020-01-01 RX ADMIN — SENNOSIDES 8.6 MG: 8.6 TABLET, FILM COATED ORAL at 17:55

## 2020-01-01 RX ADMIN — LEVETIRACETAM 500 MG: 500 TABLET, FILM COATED ORAL at 07:36

## 2020-01-01 RX ADMIN — CHLORHEXIDINE GLUCONATE 0.12% ORAL RINSE 15 ML: 1.2 LIQUID ORAL at 20:15

## 2020-01-01 RX ADMIN — AMOXICILLIN 500 MG: 500 CAPSULE ORAL at 20:32

## 2020-01-01 RX ADMIN — HYDRALAZINE HYDROCHLORIDE 25 MG: 25 TABLET ORAL at 21:12

## 2020-01-01 RX ADMIN — LEVETIRACETAM 750 MG: 750 TABLET ORAL at 10:51

## 2020-01-01 RX ADMIN — LEVETIRACETAM 500 MG: 100 INJECTION, SOLUTION INTRAVENOUS at 06:51

## 2020-01-01 RX ADMIN — LIDOCAINE HYDROCHLORIDE 15 ML: 20 SOLUTION ORAL; TOPICAL at 08:34

## 2020-01-01 RX ADMIN — LIDOCAINE HYDROCHLORIDE 15 ML: 20 SOLUTION ORAL; TOPICAL at 17:01

## 2020-01-01 RX ADMIN — NYSTATIN 500000 UNITS: 100000 SUSPENSION ORAL at 09:20

## 2020-01-01 RX ADMIN — AMOXICILLIN 500 MG: 500 CAPSULE ORAL at 08:06

## 2020-01-01 RX ADMIN — SODIUM CHLORIDE, SODIUM GLUCONATE, SODIUM ACETATE, POTASSIUM CHLORIDE, MAGNESIUM CHLORIDE, SODIUM PHOSPHATE, DIBASIC, AND POTASSIUM PHOSPHATE 50 ML/HR: .53; .5; .37; .037; .03; .012; .00082 INJECTION, SOLUTION INTRAVENOUS at 00:14

## 2020-01-01 RX ADMIN — MELATONIN 3 MG: at 21:50

## 2020-01-01 RX ADMIN — SENNOSIDES 8.6 MG: 8.6 TABLET, FILM COATED ORAL at 08:46

## 2020-01-01 RX ADMIN — SODIUM CHLORIDE 1000 ML: 0.9 INJECTION, SOLUTION INTRAVENOUS at 15:41

## 2020-01-01 RX ADMIN — SENNOSIDES 8.6 MG: 8.6 TABLET, FILM COATED ORAL at 10:39

## 2020-01-01 RX ADMIN — LORAZEPAM 2 MG: 2 INJECTION INTRAMUSCULAR; INTRAVENOUS at 13:44

## 2020-01-01 RX ADMIN — INSULIN LISPRO 1 UNITS: 100 INJECTION, SOLUTION INTRAVENOUS; SUBCUTANEOUS at 13:48

## 2020-01-01 RX ADMIN — NYSTATIN 500000 UNITS: 100000 SUSPENSION ORAL at 21:24

## 2020-01-01 RX ADMIN — METOCLOPRAMIDE 10 MG: 5 INJECTION, SOLUTION INTRAMUSCULAR; INTRAVENOUS at 11:06

## 2020-01-01 RX ADMIN — CALCIUM GLUCONATE 1 G: 20 INJECTION, SOLUTION INTRAVENOUS at 08:52

## 2020-01-01 RX ADMIN — AMOXICILLIN 500 MG: 500 CAPSULE ORAL at 07:36

## 2020-01-01 RX ADMIN — NYSTATIN 500000 UNITS: 100000 SUSPENSION ORAL at 12:12

## 2020-01-01 RX ADMIN — HEPARIN SODIUM 5000 UNITS: 5000 INJECTION INTRAVENOUS; SUBCUTANEOUS at 21:44

## 2020-01-01 RX ADMIN — ACETAMINOPHEN 650 MG: 325 TABLET ORAL at 15:55

## 2020-01-01 RX ADMIN — SENNOSIDES 8.6 MG: 8.6 TABLET, FILM COATED ORAL at 10:05

## 2020-01-01 RX ADMIN — SODIUM BICARBONATE: 84 INJECTION INTRAVENOUS at 01:05

## 2020-01-01 RX ADMIN — AMOXICILLIN 500 MG: 500 CAPSULE ORAL at 21:50

## 2020-01-01 RX ADMIN — NYSTATIN 500000 UNITS: 100000 SUSPENSION ORAL at 21:51

## 2020-01-01 RX ADMIN — HYDRALAZINE HYDROCHLORIDE 5 MG: 20 INJECTION INTRAMUSCULAR; INTRAVENOUS at 11:57

## 2020-01-01 RX ADMIN — DIPHENHYDRAMINE HCL 25 MG: 25 TABLET ORAL at 08:33

## 2020-01-01 RX ADMIN — CAPLACIZUMAB 11 MG: KIT at 14:20

## 2020-01-01 RX ADMIN — LORAZEPAM 0.5 MG: 2 INJECTION, SOLUTION INTRAMUSCULAR; INTRAVENOUS at 17:39

## 2020-01-01 RX ADMIN — ANASTROZOLE 1 MG: 1 TABLET, COATED ORAL at 09:50

## 2020-01-01 RX ADMIN — INSULIN LISPRO 1 UNITS: 100 INJECTION, SOLUTION INTRAVENOUS; SUBCUTANEOUS at 21:58

## 2020-01-01 RX ADMIN — NYSTATIN 500000 UNITS: 100000 SUSPENSION ORAL at 07:37

## 2020-01-01 RX ADMIN — INSULIN LISPRO 2 UNITS: 100 INJECTION, SOLUTION INTRAVENOUS; SUBCUTANEOUS at 12:15

## 2020-01-01 RX ADMIN — HEPARIN SODIUM 5000 UNITS: 5000 INJECTION INTRAVENOUS; SUBCUTANEOUS at 06:27

## 2020-01-01 RX ADMIN — METOCLOPRAMIDE 10 MG: 5 INJECTION, SOLUTION INTRAMUSCULAR; INTRAVENOUS at 13:34

## 2020-01-01 RX ADMIN — POLYETHYLENE GLYCOL 3350 17 G: 17 POWDER, FOR SOLUTION ORAL at 08:04

## 2020-01-01 RX ADMIN — INSULIN LISPRO 3 UNITS: 100 INJECTION, SOLUTION INTRAVENOUS; SUBCUTANEOUS at 18:02

## 2020-01-01 RX ADMIN — HYDRALAZINE HYDROCHLORIDE 25 MG: 25 TABLET ORAL at 05:23

## 2020-01-01 RX ADMIN — HYDRALAZINE HYDROCHLORIDE 25 MG: 25 TABLET ORAL at 22:35

## 2020-01-01 RX ADMIN — CALCIUM GLUCONATE 1 G: 20 INJECTION, SOLUTION INTRAVENOUS at 15:39

## 2020-01-01 RX ADMIN — LEVETIRACETAM 500 MG: 500 TABLET, FILM COATED ORAL at 21:45

## 2020-01-01 RX ADMIN — METHYLPREDNISOLONE SODIUM SUCCINATE 20 MG: 40 INJECTION, POWDER, FOR SOLUTION INTRAMUSCULAR; INTRAVENOUS at 08:06

## 2020-01-01 RX ADMIN — INSULIN LISPRO 1 UNITS: 100 INJECTION, SOLUTION INTRAVENOUS; SUBCUTANEOUS at 06:15

## 2020-01-01 RX ADMIN — METHYLPREDNISOLONE SODIUM SUCCINATE 20 MG: 40 INJECTION, POWDER, FOR SOLUTION INTRAMUSCULAR; INTRAVENOUS at 09:20

## 2020-01-01 RX ADMIN — LIDOCAINE HYDROCHLORIDE 15 ML: 20 SOLUTION ORAL; TOPICAL at 15:55

## 2020-01-01 RX ADMIN — FOLIC ACID 1 MG: 1 TABLET ORAL at 07:37

## 2020-01-01 RX ADMIN — ONDANSETRON 4 MG: 2 INJECTION INTRAMUSCULAR; INTRAVENOUS at 15:42

## 2020-01-01 RX ADMIN — NYSTATIN 500000 UNITS: 100000 SUSPENSION ORAL at 18:34

## 2020-01-01 RX ADMIN — HEPARIN SODIUM 5000 UNITS: 5000 INJECTION INTRAVENOUS; SUBCUTANEOUS at 14:43

## 2020-01-01 RX ADMIN — CALCIUM CHLORIDE 1 G: 100 INJECTION PARENTERAL at 23:49

## 2020-01-01 RX ADMIN — ONDANSETRON 4 MG: 2 INJECTION INTRAMUSCULAR; INTRAVENOUS at 22:01

## 2020-01-01 RX ADMIN — SENNOSIDES 8.6 MG: 8.6 TABLET, FILM COATED ORAL at 11:40

## 2020-01-01 RX ADMIN — HEPARIN SODIUM 5000 UNITS: 5000 INJECTION INTRAVENOUS; SUBCUTANEOUS at 13:03

## 2020-01-01 RX ADMIN — SODIUM CHLORIDE 125 ML/HR: 0.9 INJECTION, SOLUTION INTRAVENOUS at 22:00

## 2020-01-01 RX ADMIN — SENNOSIDES 8.6 MG: 8.6 TABLET, FILM COATED ORAL at 09:57

## 2020-01-01 RX ADMIN — LIDOCAINE HYDROCHLORIDE 15 ML: 20 SOLUTION ORAL; TOPICAL at 12:53

## 2020-01-01 RX ADMIN — LIDOCAINE HYDROCHLORIDE 15 ML: 20 SOLUTION ORAL; TOPICAL at 13:17

## 2020-01-01 RX ADMIN — HYDRALAZINE HYDROCHLORIDE 25 MG: 25 TABLET ORAL at 05:08

## 2020-01-01 RX ADMIN — FENTANYL CITRATE 50 MCG: 50 INJECTION INTRAMUSCULAR; INTRAVENOUS at 19:13

## 2020-01-01 RX ADMIN — CALCIUM GLUCONATE 1 G: 20 INJECTION, SOLUTION INTRAVENOUS at 17:39

## 2020-01-01 RX ADMIN — FOLIC ACID 1 MG: 1 TABLET ORAL at 10:06

## 2020-01-01 RX ADMIN — CALCIUM GLUCONATE 1 G: 20 INJECTION, SOLUTION INTRAVENOUS at 17:44

## 2020-01-01 RX ADMIN — LABETALOL 20 MG/4 ML (5 MG/ML) INTRAVENOUS SYRINGE 10 MG: at 07:14

## 2020-01-01 RX ADMIN — CALCIUM GLUCONATE 2 G: 20 INJECTION, SOLUTION INTRAVENOUS at 10:38

## 2020-01-01 RX ADMIN — LEVOTHYROXINE SODIUM 88 MCG: 88 TABLET ORAL at 06:32

## 2020-01-01 RX ADMIN — FOLIC ACID 1 MG: 1 TABLET ORAL at 08:04

## 2020-01-01 RX ADMIN — DIPHENHYDRAMINE HYDROCHLORIDE 25 MG: 50 INJECTION INTRAMUSCULAR; INTRAVENOUS at 13:22

## 2020-01-01 RX ADMIN — LIDOCAINE HYDROCHLORIDE 15 ML: 20 SOLUTION ORAL; TOPICAL at 17:00

## 2020-01-01 RX ADMIN — LEVETIRACETAM 500 MG: 500 TABLET, FILM COATED ORAL at 08:54

## 2020-01-01 RX ADMIN — METHYLPREDNISOLONE SODIUM SUCCINATE 20 MG: 40 INJECTION, POWDER, FOR SOLUTION INTRAMUSCULAR; INTRAVENOUS at 13:31

## 2020-01-01 RX ADMIN — MELATONIN 3 MG: at 22:23

## 2020-01-01 RX ADMIN — FAMOTIDINE 20 MG: 20 TABLET ORAL at 08:17

## 2020-01-01 RX ADMIN — CEFEPIME HYDROCHLORIDE 1000 MG: 1 INJECTION, POWDER, FOR SOLUTION INTRAMUSCULAR; INTRAVENOUS at 21:36

## 2020-01-01 RX ADMIN — SENNOSIDES 8.6 MG: 8.6 TABLET, FILM COATED ORAL at 08:16

## 2020-01-01 RX ADMIN — SODIUM CHLORIDE, SODIUM GLUCONATE, SODIUM ACETATE, POTASSIUM CHLORIDE, MAGNESIUM CHLORIDE, SODIUM PHOSPHATE, DIBASIC, AND POTASSIUM PHOSPHATE 75 ML/HR: .53; .5; .37; .037; .03; .012; .00082 INJECTION, SOLUTION INTRAVENOUS at 03:30

## 2020-01-01 RX ADMIN — CEFEPIME HYDROCHLORIDE 1000 MG: 1 INJECTION, POWDER, FOR SOLUTION INTRAMUSCULAR; INTRAVENOUS at 11:06

## 2020-01-01 RX ADMIN — ACETAMINOPHEN 650 MG: 325 TABLET ORAL at 10:53

## 2020-01-01 RX ADMIN — INSULIN LISPRO 1 UNITS: 100 INJECTION, SOLUTION INTRAVENOUS; SUBCUTANEOUS at 12:01

## 2020-01-01 RX ADMIN — NYSTATIN 500000 UNITS: 100000 SUSPENSION ORAL at 13:17

## 2020-01-01 RX ADMIN — INSULIN LISPRO 1 UNITS: 100 INJECTION, SOLUTION INTRAVENOUS; SUBCUTANEOUS at 12:49

## 2020-01-01 RX ADMIN — NYSTATIN 500000 UNITS: 100000 SUSPENSION ORAL at 10:00

## 2020-01-01 RX ADMIN — DIPHENHYDRAMINE HCL 25 MG: 25 TABLET ORAL at 00:10

## 2020-01-01 RX ADMIN — METHYLPREDNISOLONE SODIUM SUCCINATE 20 MG: 40 INJECTION, POWDER, FOR SOLUTION INTRAMUSCULAR; INTRAVENOUS at 12:03

## 2020-01-01 RX ADMIN — ACETAMINOPHEN 650 MG: 325 TABLET ORAL at 12:10

## 2020-01-01 RX ADMIN — FOLIC ACID 1 MG: 1 TABLET ORAL at 10:08

## 2020-01-01 RX ADMIN — NYSTATIN 500000 UNITS: 100000 SUSPENSION ORAL at 13:22

## 2020-01-01 RX ADMIN — ECULIZUMAB 900 MG: 300 INJECTION, SOLUTION, CONCENTRATE INTRAVENOUS at 18:00

## 2020-01-01 RX ADMIN — FAMOTIDINE 20 MG: 40 POWDER, FOR SUSPENSION ORAL at 09:52

## 2020-01-01 RX ADMIN — ONDANSETRON 4 MG: 2 INJECTION INTRAMUSCULAR; INTRAVENOUS at 22:56

## 2020-01-01 RX ADMIN — FENTANYL CITRATE 50 MCG: 50 INJECTION INTRAMUSCULAR; INTRAVENOUS at 13:52

## 2020-01-01 RX ADMIN — CAPLACIZUMAB 11 MG: KIT at 17:00

## 2020-01-01 RX ADMIN — SENNOSIDES 8.6 MG: 8.6 TABLET, FILM COATED ORAL at 08:03

## 2020-01-01 RX ADMIN — LORAZEPAM 2 MG: 2 INJECTION INTRAMUSCULAR; INTRAVENOUS at 20:01

## 2020-01-01 RX ADMIN — HEPARIN SODIUM 5000 UNITS: 5000 INJECTION INTRAVENOUS; SUBCUTANEOUS at 21:51

## 2020-01-01 RX ADMIN — EPINEPHRINE 1 MG: 0.1 INJECTION INTRACARDIAC; INTRAVENOUS at 23:24

## 2020-01-01 RX ADMIN — METHYLPREDNISOLONE SODIUM SUCCINATE 20 MG: 40 INJECTION, POWDER, FOR SOLUTION INTRAMUSCULAR; INTRAVENOUS at 08:18

## 2020-01-01 RX ADMIN — FOLIC ACID 1 MG: 1 TABLET ORAL at 08:20

## 2020-01-01 RX ADMIN — LEVOTHYROXINE SODIUM 88 MCG: 88 TABLET ORAL at 06:37

## 2020-01-01 RX ADMIN — HYDRALAZINE HYDROCHLORIDE 25 MG: 25 TABLET ORAL at 22:05

## 2020-01-01 RX ADMIN — SODIUM CHLORIDE 125 ML/HR: 0.9 INJECTION, SOLUTION INTRAVENOUS at 07:51

## 2020-01-01 RX ADMIN — INSULIN LISPRO 2 UNITS: 100 INJECTION, SOLUTION INTRAVENOUS; SUBCUTANEOUS at 05:53

## 2020-01-01 RX ADMIN — MELATONIN 3 MG: at 21:12

## 2020-01-01 RX ADMIN — LEVETIRACETAM 500 MG: 500 TABLET, FILM COATED ORAL at 21:44

## 2020-01-01 RX ADMIN — CAPLACIZUMAB 11 MG: KIT at 18:49

## 2020-01-01 RX ADMIN — NYSTATIN 500000 UNITS: 100000 SUSPENSION ORAL at 08:17

## 2020-01-01 RX ADMIN — LEVOTHYROXINE SODIUM 88 MCG: 88 TABLET ORAL at 05:24

## 2020-01-01 RX ADMIN — AMLODIPINE BESYLATE 10 MG: 10 TABLET ORAL at 08:16

## 2020-01-01 RX ADMIN — DIPHENHYDRAMINE HCL 25 MG: 25 TABLET ORAL at 09:57

## 2020-01-01 RX ADMIN — FOLIC ACID 1 MG: 1 TABLET ORAL at 10:46

## 2020-01-01 RX ADMIN — LEVETIRACETAM 500 MG: 500 TABLET, FILM COATED ORAL at 08:20

## 2020-01-01 RX ADMIN — CEFTRIAXONE SODIUM 1000 MG: 1 INJECTION, POWDER, FOR SOLUTION INTRAMUSCULAR; INTRAVENOUS at 12:14

## 2020-01-01 RX ADMIN — AMLODIPINE BESYLATE 10 MG: 10 TABLET ORAL at 10:39

## 2020-01-01 RX ADMIN — CALCIUM GLUCONATE 1 G: 20 INJECTION, SOLUTION INTRAVENOUS at 11:42

## 2020-01-01 RX ADMIN — LEVETIRACETAM 500 MG: 500 TABLET, FILM COATED ORAL at 22:02

## 2020-01-01 RX ADMIN — CALCIUM GLUCONATE 1 G: 20 INJECTION, SOLUTION INTRAVENOUS at 15:25

## 2020-01-01 RX ADMIN — DIPHENHYDRAMINE HCL 25 MG: 25 TABLET ORAL at 13:04

## 2020-01-01 RX ADMIN — LEVETIRACETAM 750 MG: 750 TABLET ORAL at 22:23

## 2020-01-01 RX ADMIN — HYDRALAZINE HYDROCHLORIDE 25 MG: 25 TABLET ORAL at 06:38

## 2020-01-01 RX ADMIN — SODIUM BICARBONATE: 84 INJECTION INTRAVENOUS at 00:30

## 2020-01-01 RX ADMIN — LEVOTHYROXINE SODIUM 88 MCG: 88 TABLET ORAL at 05:19

## 2020-01-01 RX ADMIN — CALCIUM GLUCONATE 1 G: 20 INJECTION, SOLUTION INTRAVENOUS at 13:47

## 2020-01-01 RX ADMIN — FAMOTIDINE 20 MG: 20 TABLET ORAL at 16:04

## 2020-01-01 RX ADMIN — LORAZEPAM 2 MG: 2 INJECTION INTRAMUSCULAR; INTRAVENOUS at 00:36

## 2020-01-01 RX ADMIN — FAMOTIDINE 20 MG: 20 TABLET ORAL at 09:57

## 2020-01-01 RX ADMIN — SENNOSIDES 8.6 MG: 8.6 TABLET, FILM COATED ORAL at 17:19

## 2020-01-01 RX ADMIN — FAMOTIDINE 20 MG: 20 TABLET ORAL at 10:05

## 2020-01-01 RX ADMIN — DIPHENHYDRAMINE HCL 25 MG: 25 TABLET ORAL at 08:54

## 2020-01-01 RX ADMIN — LEVOTHYROXINE SODIUM 88 MCG: 88 TABLET ORAL at 05:16

## 2020-01-01 RX ADMIN — LIDOCAINE HYDROCHLORIDE 15 ML: 20 SOLUTION ORAL; TOPICAL at 17:18

## 2020-01-01 RX ADMIN — AMLODIPINE BESYLATE 10 MG: 10 TABLET ORAL at 08:03

## 2020-01-01 RX ADMIN — LIDOCAINE HYDROCHLORIDE 15 ML: 20 SOLUTION ORAL; TOPICAL at 08:14

## 2020-01-01 RX ADMIN — LEVETIRACETAM 500 MG: 500 TABLET, FILM COATED ORAL at 21:34

## 2020-01-01 RX ADMIN — CHLORHEXIDINE GLUCONATE 0.12% ORAL RINSE 15 ML: 1.2 LIQUID ORAL at 20:28

## 2020-01-01 RX ADMIN — AMOXICILLIN 500 MG: 500 CAPSULE ORAL at 08:53

## 2020-01-01 RX ADMIN — SODIUM BICARBONATE 50 MEQ: 84 INJECTION, SOLUTION INTRAVENOUS at 23:44

## 2020-01-01 RX ADMIN — FAMOTIDINE 20 MG: 40 POWDER, FOR SUSPENSION ORAL at 08:12

## 2020-01-01 RX ADMIN — CHLORHEXIDINE GLUCONATE 0.12% ORAL RINSE 15 ML: 1.2 LIQUID ORAL at 21:01

## 2020-01-01 RX ADMIN — INSULIN LISPRO 2 UNITS: 100 INJECTION, SOLUTION INTRAVENOUS; SUBCUTANEOUS at 16:57

## 2020-01-01 RX ADMIN — HYDRALAZINE HYDROCHLORIDE 10 MG: 20 INJECTION INTRAMUSCULAR; INTRAVENOUS at 00:12

## 2020-01-01 RX ADMIN — EPINEPHRINE 1 MG: 0.1 INJECTION INTRACARDIAC; INTRAVENOUS at 22:21

## 2020-01-01 RX ADMIN — METHYLPREDNISOLONE SODIUM SUCCINATE 20 MG: 40 INJECTION, POWDER, FOR SOLUTION INTRAMUSCULAR; INTRAVENOUS at 10:39

## 2020-01-01 RX ADMIN — SENNOSIDES 8.6 MG: 8.6 TABLET, FILM COATED ORAL at 17:00

## 2020-01-01 RX ADMIN — HEPARIN SODIUM 5000 UNITS: 5000 INJECTION INTRAVENOUS; SUBCUTANEOUS at 05:06

## 2020-01-01 RX ADMIN — HYDRALAZINE HYDROCHLORIDE 25 MG: 25 TABLET ORAL at 14:08

## 2020-01-01 RX ADMIN — FENTANYL CITRATE 50 MCG: 50 INJECTION INTRAMUSCULAR; INTRAVENOUS at 01:05

## 2020-01-01 RX ADMIN — CHLORHEXIDINE GLUCONATE 0.12% ORAL RINSE 15 ML: 1.2 LIQUID ORAL at 07:37

## 2020-01-01 RX ADMIN — HYDRALAZINE HYDROCHLORIDE 10 MG: 20 INJECTION INTRAMUSCULAR; INTRAVENOUS at 22:03

## 2020-01-01 RX ADMIN — LIDOCAINE HYDROCHLORIDE 15 ML: 20 SOLUTION ORAL; TOPICAL at 11:00

## 2020-01-01 RX ADMIN — LEVETIRACETAM 500 MG: 500 TABLET, FILM COATED ORAL at 20:32

## 2020-01-01 RX ADMIN — ANASTROZOLE 1 MG: 1 TABLET, COATED ORAL at 08:45

## 2020-01-01 RX ADMIN — POLYETHYLENE GLYCOL 3350 17 G: 17 POWDER, FOR SOLUTION ORAL at 08:16

## 2020-01-01 RX ADMIN — HYDRALAZINE HYDROCHLORIDE 10 MG: 20 INJECTION INTRAMUSCULAR; INTRAVENOUS at 07:57

## 2020-01-01 RX ADMIN — CHLORHEXIDINE GLUCONATE 0.12% ORAL RINSE 15 ML: 1.2 LIQUID ORAL at 08:34

## 2020-01-01 RX ADMIN — AMOXICILLIN 500 MG: 500 CAPSULE ORAL at 08:33

## 2020-01-01 RX ADMIN — NYSTATIN 500000 UNITS: 100000 SUSPENSION ORAL at 18:30

## 2020-01-01 RX ADMIN — HYDRALAZINE HYDROCHLORIDE 25 MG: 25 TABLET ORAL at 13:20

## 2020-01-01 RX ADMIN — INSULIN LISPRO 1 UNITS: 100 INJECTION, SOLUTION INTRAVENOUS; SUBCUTANEOUS at 18:34

## 2020-01-01 RX ADMIN — SENNOSIDES 8.6 MG: 8.6 TABLET, FILM COATED ORAL at 08:17

## 2020-01-01 RX ADMIN — SODIUM CHLORIDE 125 ML/HR: 0.9 INJECTION, SOLUTION INTRAVENOUS at 00:20

## 2020-01-01 RX ADMIN — LEVETIRACETAM 500 MG: 500 TABLET, FILM COATED ORAL at 21:48

## 2020-01-01 RX ADMIN — HYDRALAZINE HYDROCHLORIDE 25 MG: 25 TABLET ORAL at 14:17

## 2020-01-01 RX ADMIN — AMOXICILLIN 500 MG: 500 CAPSULE ORAL at 08:20

## 2020-01-01 RX ADMIN — POLYETHYLENE GLYCOL 3350 17 G: 17 POWDER, FOR SOLUTION ORAL at 08:44

## 2020-01-01 RX ADMIN — SODIUM BICARBONATE 125 ML/HR: 84 INJECTION, SOLUTION INTRAVENOUS at 00:00

## 2020-01-01 RX ADMIN — INSULIN LISPRO 3 UNITS: 100 INJECTION, SOLUTION INTRAVENOUS; SUBCUTANEOUS at 18:32

## 2020-01-01 RX ADMIN — SODIUM CHLORIDE 125 ML/HR: 0.9 INJECTION, SOLUTION INTRAVENOUS at 08:06

## 2020-01-01 RX ADMIN — LIDOCAINE HYDROCHLORIDE 15 ML: 20 SOLUTION ORAL; TOPICAL at 17:47

## 2020-01-01 RX ADMIN — CALCIUM GLUCONATE 1 G: 20 INJECTION, SOLUTION INTRAVENOUS at 09:44

## 2020-01-01 RX ADMIN — CAPLACIZUMAB 11 MG: KIT at 15:38

## 2020-01-01 RX ADMIN — HYDRALAZINE HYDROCHLORIDE 25 MG: 25 TABLET ORAL at 15:59

## 2020-01-01 RX ADMIN — AMLODIPINE BESYLATE 10 MG: 10 TABLET ORAL at 08:12

## 2020-01-01 RX ADMIN — POLYETHYLENE GLYCOL 3350 17 G: 17 POWDER, FOR SOLUTION ORAL at 10:20

## 2020-01-01 RX ADMIN — LEVETIRACETAM 500 MG: 500 TABLET, FILM COATED ORAL at 10:46

## 2020-01-01 RX ADMIN — CALCIUM GLUCONATE 1 G: 20 INJECTION, SOLUTION INTRAVENOUS at 18:40

## 2020-01-01 RX ADMIN — CALCIUM GLUCONATE 1 G: 20 INJECTION, SOLUTION INTRAVENOUS at 07:39

## 2020-01-01 RX ADMIN — LEVOTHYROXINE SODIUM 88 MCG: 88 TABLET ORAL at 05:44

## 2020-01-01 RX ADMIN — HYDRALAZINE HYDROCHLORIDE 25 MG: 25 TABLET ORAL at 14:51

## 2020-01-01 RX ADMIN — CAPLACIZUMAB 11 MG: KIT at 15:41

## 2020-01-01 RX ADMIN — LABETALOL 20 MG/4 ML (5 MG/ML) INTRAVENOUS SYRINGE 10 MG: at 06:34

## 2020-01-01 RX ADMIN — NYSTATIN 500000 UNITS: 100000 SUSPENSION ORAL at 17:13

## 2020-01-01 RX ADMIN — SENNOSIDES 8.6 MG: 8.6 TABLET, FILM COATED ORAL at 10:54

## 2020-01-01 RX ADMIN — SODIUM CHLORIDE, SODIUM GLUCONATE, SODIUM ACETATE, POTASSIUM CHLORIDE, MAGNESIUM CHLORIDE, SODIUM PHOSPHATE, DIBASIC, AND POTASSIUM PHOSPHATE 50 ML/HR: .53; .5; .37; .037; .03; .012; .00082 INJECTION, SOLUTION INTRAVENOUS at 15:38

## 2020-01-01 RX ADMIN — LEVETIRACETAM 750 MG: 100 INJECTION, SOLUTION INTRAVENOUS at 10:32

## 2020-01-01 RX ADMIN — INSULIN LISPRO 1 UNITS: 100 INJECTION, SOLUTION INTRAVENOUS; SUBCUTANEOUS at 16:48

## 2020-01-01 RX ADMIN — METHYLPREDNISOLONE SODIUM SUCCINATE 20 MG: 40 INJECTION, POWDER, FOR SOLUTION INTRAMUSCULAR; INTRAVENOUS at 08:55

## 2020-01-01 RX ADMIN — AMLODIPINE BESYLATE 10 MG: 10 TABLET ORAL at 08:45

## 2020-01-01 RX ADMIN — FOLIC ACID 1 MG: 1 TABLET ORAL at 08:16

## 2020-01-01 RX ADMIN — NYSTATIN 500000 UNITS: 100000 SUSPENSION ORAL at 23:00

## 2020-01-01 RX ADMIN — HEPARIN SODIUM 5000 UNITS: 5000 INJECTION INTRAVENOUS; SUBCUTANEOUS at 06:37

## 2020-01-01 RX ADMIN — FAMOTIDINE 20 MG: 20 TABLET ORAL at 10:08

## 2020-01-01 RX ADMIN — CHLORHEXIDINE GLUCONATE 0.12% ORAL RINSE 15 ML: 1.2 LIQUID ORAL at 21:34

## 2020-01-01 RX ADMIN — AMLODIPINE BESYLATE 10 MG: 10 TABLET ORAL at 10:08

## 2020-01-01 RX ADMIN — POLYETHYLENE GLYCOL 3350 17 G: 17 POWDER, FOR SOLUTION ORAL at 09:55

## 2020-01-01 RX ADMIN — SENNOSIDES 8.6 MG: 8.6 TABLET, FILM COATED ORAL at 10:46

## 2020-01-01 RX ADMIN — SODIUM CHLORIDE, SODIUM GLUCONATE, SODIUM ACETATE, POTASSIUM CHLORIDE, MAGNESIUM CHLORIDE, SODIUM PHOSPHATE, DIBASIC, AND POTASSIUM PHOSPHATE 75 ML/HR: .53; .5; .37; .037; .03; .012; .00082 INJECTION, SOLUTION INTRAVENOUS at 15:12

## 2020-01-01 RX ADMIN — INSULIN LISPRO 1 UNITS: 100 INJECTION, SOLUTION INTRAVENOUS; SUBCUTANEOUS at 18:30

## 2020-01-01 RX ADMIN — FOLIC ACID 1 MG: 1 TABLET ORAL at 08:52

## 2020-01-01 RX ADMIN — METOCLOPRAMIDE 10 MG: 5 INJECTION, SOLUTION INTRAMUSCULAR; INTRAVENOUS at 13:44

## 2020-01-01 RX ADMIN — AMOXICILLIN 500 MG: 500 CAPSULE ORAL at 08:45

## 2020-01-01 RX ADMIN — LABETALOL 20 MG/4 ML (5 MG/ML) INTRAVENOUS SYRINGE 10 MG: at 11:29

## 2020-01-01 RX ADMIN — POLYETHYLENE GLYCOL 3350 17 G: 17 POWDER, FOR SOLUTION ORAL at 10:00

## 2020-01-01 RX ADMIN — CHLORHEXIDINE GLUCONATE 0.12% ORAL RINSE 15 ML: 1.2 LIQUID ORAL at 08:18

## 2020-01-01 RX ADMIN — NYSTATIN 500000 UNITS: 100000 SUSPENSION ORAL at 22:26

## 2020-01-01 RX ADMIN — HYDRALAZINE HYDROCHLORIDE 25 MG: 25 TABLET ORAL at 22:23

## 2020-01-01 RX ADMIN — HYDRALAZINE HYDROCHLORIDE 25 MG: 25 TABLET ORAL at 14:27

## 2020-01-01 RX ADMIN — ONDANSETRON 4 MG: 2 INJECTION INTRAMUSCULAR; INTRAVENOUS at 08:49

## 2020-01-01 RX ADMIN — CAPLACIZUMAB 11 MG: KIT at 13:34

## 2020-01-01 RX ADMIN — CALCIUM GLUCONATE 1 G: 20 INJECTION, SOLUTION INTRAVENOUS at 08:54

## 2020-01-01 RX ADMIN — CHLORHEXIDINE GLUCONATE 0.12% ORAL RINSE 15 ML: 1.2 LIQUID ORAL at 21:58

## 2020-01-01 RX ADMIN — AMOXICILLIN 500 MG: 500 CAPSULE ORAL at 22:02

## 2020-01-01 RX ADMIN — NYSTATIN 500000 UNITS: 100000 SUSPENSION ORAL at 11:45

## 2020-01-01 RX ADMIN — CALCIUM GLUCONATE 1 G: 20 INJECTION, SOLUTION INTRAVENOUS at 14:18

## 2020-01-01 RX ADMIN — INSULIN LISPRO 1 UNITS: 100 INJECTION, SOLUTION INTRAVENOUS; SUBCUTANEOUS at 17:46

## 2020-01-01 RX ADMIN — SODIUM CHLORIDE, SODIUM GLUCONATE, SODIUM ACETATE, POTASSIUM CHLORIDE, MAGNESIUM CHLORIDE, SODIUM PHOSPHATE, DIBASIC, AND POTASSIUM PHOSPHATE 75 ML/HR: .53; .5; .37; .037; .03; .012; .00082 INJECTION, SOLUTION INTRAVENOUS at 22:06

## 2020-01-01 RX ADMIN — AMLODIPINE BESYLATE 10 MG: 10 TABLET ORAL at 08:54

## 2020-01-01 RX ADMIN — LEVETIRACETAM 750 MG: 750 TABLET ORAL at 10:06

## 2020-01-01 RX ADMIN — SODIUM CHLORIDE 250 ML: 0.9 INJECTION, SOLUTION INTRAVENOUS at 19:39

## 2020-01-01 RX ADMIN — HEPARIN SODIUM 5000 UNITS: 5000 INJECTION INTRAVENOUS; SUBCUTANEOUS at 06:34

## 2020-01-01 RX ADMIN — METHYLPREDNISOLONE SODIUM SUCCINATE 20 MG: 40 INJECTION, POWDER, FOR SOLUTION INTRAMUSCULAR; INTRAVENOUS at 08:44

## 2020-01-01 RX ADMIN — FOLIC ACID 1 MG: 1 TABLET ORAL at 09:51

## 2020-01-01 RX ADMIN — DIPHENHYDRAMINE HCL 25 MG: 25 TABLET ORAL at 18:30

## 2020-01-01 RX ADMIN — AMLODIPINE BESYLATE 10 MG: 10 TABLET ORAL at 08:46

## 2020-01-01 RX ADMIN — ANASTROZOLE 1 MG: 1 TABLET, COATED ORAL at 08:03

## 2020-01-01 RX ADMIN — NYSTATIN 500000 UNITS: 100000 SUSPENSION ORAL at 10:57

## 2020-01-01 RX ADMIN — FOLIC ACID 1 MG: 1 TABLET ORAL at 09:57

## 2020-01-01 RX ADMIN — ONDANSETRON 4 MG: 2 INJECTION INTRAMUSCULAR; INTRAVENOUS at 16:57

## 2020-01-01 RX ADMIN — CALCIUM GLUCONATE 1 G: 20 INJECTION, SOLUTION INTRAVENOUS at 11:13

## 2020-01-01 RX ADMIN — CHLORHEXIDINE GLUCONATE 0.12% ORAL RINSE 15 ML: 1.2 LIQUID ORAL at 08:44

## 2020-01-01 RX ADMIN — PANTOPRAZOLE SODIUM 40 MG: 40 TABLET, DELAYED RELEASE ORAL at 06:03

## 2020-01-01 RX ADMIN — SODIUM CHLORIDE, SODIUM GLUCONATE, SODIUM ACETATE, POTASSIUM CHLORIDE, MAGNESIUM CHLORIDE, SODIUM PHOSPHATE, DIBASIC, AND POTASSIUM PHOSPHATE 75 ML/HR: .53; .5; .37; .037; .03; .012; .00082 INJECTION, SOLUTION INTRAVENOUS at 13:11

## 2020-01-01 RX ADMIN — HYDRALAZINE HYDROCHLORIDE 25 MG: 25 TABLET ORAL at 22:28

## 2020-01-01 RX ADMIN — SENNOSIDES 8.6 MG: 8.6 TABLET, FILM COATED ORAL at 15:36

## 2020-01-01 RX ADMIN — HYDRALAZINE HYDROCHLORIDE 25 MG: 25 TABLET ORAL at 05:07

## 2020-01-01 RX ADMIN — HEPARIN SODIUM 5000 UNITS: 5000 INJECTION INTRAVENOUS; SUBCUTANEOUS at 15:42

## 2020-01-01 RX ADMIN — AMLODIPINE BESYLATE 10 MG: 10 TABLET ORAL at 08:20

## 2020-01-01 RX ADMIN — CAPLACIZUMAB 11 MG: KIT at 21:40

## 2020-01-01 RX ADMIN — HYDRALAZINE HYDROCHLORIDE 25 MG: 25 TABLET ORAL at 13:34

## 2020-01-01 RX ADMIN — ALPRAZOLAM 0.5 MG: 0.5 TABLET ORAL at 21:25

## 2020-01-01 RX ADMIN — HEPARIN SODIUM 5000 UNITS: 5000 INJECTION INTRAVENOUS; SUBCUTANEOUS at 14:05

## 2020-01-01 RX ADMIN — LEVETIRACETAM 750 MG: 750 TABLET ORAL at 10:39

## 2020-01-01 RX ADMIN — MELATONIN 3 MG: at 22:24

## 2020-01-01 RX ADMIN — FOLIC ACID 1 MG: 1 TABLET ORAL at 09:55

## 2020-01-01 RX ADMIN — LEVETIRACETAM 750 MG: 750 TABLET ORAL at 08:17

## 2020-01-01 RX ADMIN — HEPARIN SODIUM 5000 UNITS: 5000 INJECTION INTRAVENOUS; SUBCUTANEOUS at 14:56

## 2020-01-01 RX ADMIN — NYSTATIN 500000 UNITS: 100000 SUSPENSION ORAL at 10:05

## 2020-01-01 RX ADMIN — SENNOSIDES 8.6 MG: 8.6 TABLET, FILM COATED ORAL at 17:14

## 2020-01-01 RX ADMIN — SENNOSIDES 8.6 MG: 8.6 TABLET, FILM COATED ORAL at 18:30

## 2020-01-01 RX ADMIN — HEPARIN SODIUM 5000 UNITS: 5000 INJECTION INTRAVENOUS; SUBCUTANEOUS at 09:54

## 2020-01-01 RX ADMIN — LIDOCAINE HYDROCHLORIDE 15 ML: 20 SOLUTION ORAL; TOPICAL at 16:57

## 2020-01-01 RX ADMIN — INSULIN LISPRO 2 UNITS: 100 INJECTION, SOLUTION INTRAVENOUS; SUBCUTANEOUS at 23:47

## 2020-01-01 RX ADMIN — EPINEPHRINE 1 MG: 0.1 INJECTION INTRACARDIAC; INTRAVENOUS at 23:42

## 2020-01-01 RX ADMIN — HEPARIN SODIUM 5000 UNITS: 5000 INJECTION INTRAVENOUS; SUBCUTANEOUS at 06:31

## 2020-01-01 RX ADMIN — Medication 75 MCG/HR: at 03:27

## 2020-01-01 RX ADMIN — ACETAMINOPHEN 650 MG: 325 TABLET ORAL at 08:33

## 2020-01-01 RX ADMIN — HEPARIN SODIUM 5000 UNITS: 5000 INJECTION INTRAVENOUS; SUBCUTANEOUS at 13:34

## 2020-01-01 RX ADMIN — SENNOSIDES 8.6 MG: 8.6 TABLET, FILM COATED ORAL at 09:20

## 2020-01-01 RX ADMIN — EPINEPHRINE 1 MG: 0.1 INJECTION INTRACARDIAC; INTRAVENOUS at 23:36

## 2020-01-01 RX ADMIN — SODIUM CHLORIDE, SODIUM GLUCONATE, SODIUM ACETATE, POTASSIUM CHLORIDE, MAGNESIUM CHLORIDE, SODIUM PHOSPHATE, DIBASIC, AND POTASSIUM PHOSPHATE 75 ML/HR: .53; .5; .37; .037; .03; .012; .00082 INJECTION, SOLUTION INTRAVENOUS at 10:30

## 2020-01-01 RX ADMIN — SODIUM CHLORIDE 5 MG/HR: 0.9 INJECTION, SOLUTION INTRAVENOUS at 09:17

## 2020-01-01 RX ADMIN — METHYLPREDNISOLONE SODIUM SUCCINATE 20 MG: 40 INJECTION, POWDER, FOR SOLUTION INTRAMUSCULAR; INTRAVENOUS at 10:06

## 2020-01-01 RX ADMIN — INSULIN LISPRO 2 UNITS: 100 INJECTION, SOLUTION INTRAVENOUS; SUBCUTANEOUS at 21:29

## 2020-01-01 RX ADMIN — AMOXICILLIN 500 MG: 500 CAPSULE ORAL at 14:56

## 2020-01-01 RX ADMIN — CALCIUM GLUCONATE 2 G: 20 INJECTION, SOLUTION INTRAVENOUS at 12:47

## 2020-01-01 RX ADMIN — INSULIN LISPRO 3 UNITS: 100 INJECTION, SOLUTION INTRAVENOUS; SUBCUTANEOUS at 19:31

## 2020-01-01 RX ADMIN — LIDOCAINE HYDROCHLORIDE 15 ML: 20 SOLUTION ORAL; TOPICAL at 06:32

## 2020-01-01 RX ADMIN — MELATONIN 3 MG: at 22:54

## 2020-01-01 RX ADMIN — HYDRALAZINE HYDROCHLORIDE 25 MG: 25 TABLET ORAL at 06:30

## 2020-01-01 RX ADMIN — FOLIC ACID 1 MG: 1 TABLET ORAL at 09:19

## 2020-01-01 RX ADMIN — ACETAMINOPHEN 650 MG: 325 TABLET ORAL at 15:36

## 2020-01-01 RX ADMIN — NYSTATIN 500000 UNITS: 100000 SUSPENSION ORAL at 08:32

## 2020-01-01 RX ADMIN — LEVETIRACETAM 1000 MG: 100 INJECTION, SOLUTION INTRAVENOUS at 19:38

## 2020-01-01 RX ADMIN — CHLORHEXIDINE GLUCONATE 0.12% ORAL RINSE 15 ML: 1.2 LIQUID ORAL at 09:22

## 2020-01-01 RX ADMIN — ANASTROZOLE 1 MG: 1 TABLET, COATED ORAL at 08:05

## 2020-01-01 RX ADMIN — HEPARIN SODIUM 5000 UNITS: 5000 INJECTION INTRAVENOUS; SUBCUTANEOUS at 13:17

## 2020-01-01 RX ADMIN — HYDRALAZINE HYDROCHLORIDE 25 MG: 20 INJECTION INTRAMUSCULAR; INTRAVENOUS at 04:23

## 2020-01-01 RX ADMIN — FAMOTIDINE 20 MG: 20 TABLET ORAL at 08:54

## 2020-01-01 RX ADMIN — METHYLPREDNISOLONE SODIUM SUCCINATE 20 MG: 40 INJECTION, POWDER, FOR SOLUTION INTRAMUSCULAR; INTRAVENOUS at 08:43

## 2020-01-01 RX ADMIN — SENNOSIDES 8.6 MG: 8.6 TABLET, FILM COATED ORAL at 17:52

## 2020-01-01 RX ADMIN — INSULIN LISPRO 2 UNITS: 100 INJECTION, SOLUTION INTRAVENOUS; SUBCUTANEOUS at 17:01

## 2020-01-01 RX ADMIN — ANASTROZOLE 1 MG: 1 TABLET, COATED ORAL at 09:23

## 2020-01-01 RX ADMIN — NYSTATIN 500000 UNITS: 100000 SUSPENSION ORAL at 12:14

## 2020-01-01 RX ADMIN — ACETAMINOPHEN 650 MG: 325 TABLET ORAL at 23:48

## 2020-01-01 RX ADMIN — INSULIN LISPRO 1 UNITS: 100 INJECTION, SOLUTION INTRAVENOUS; SUBCUTANEOUS at 17:00

## 2020-01-01 RX ADMIN — NYSTATIN 500000 UNITS: 100000 SUSPENSION ORAL at 18:00

## 2020-01-01 RX ADMIN — ACETAMINOPHEN 650 MG: 325 TABLET ORAL at 17:53

## 2020-01-01 RX ADMIN — HYDRALAZINE HYDROCHLORIDE 25 MG: 25 TABLET ORAL at 21:48

## 2020-01-01 RX ADMIN — SODIUM CHLORIDE 10 MCG/MIN: 0.9 INJECTION, SOLUTION INTRAVENOUS at 22:23

## 2020-01-01 RX ADMIN — NYSTATIN 500000 UNITS: 100000 SUSPENSION ORAL at 10:18

## 2020-01-01 RX ADMIN — ONDANSETRON 4 MG: 2 INJECTION INTRAMUSCULAR; INTRAVENOUS at 09:49

## 2020-01-01 RX ADMIN — NYSTATIN 500000 UNITS: 100000 SUSPENSION ORAL at 21:34

## 2020-01-01 RX ADMIN — ANASTROZOLE 1 MG: 1 TABLET, COATED ORAL at 09:24

## 2020-01-01 RX ADMIN — POLYETHYLENE GLYCOL 3350 17 G: 17 POWDER, FOR SOLUTION ORAL at 10:59

## 2020-01-01 RX ADMIN — LEVETIRACETAM 750 MG: 750 TABLET ORAL at 09:57

## 2020-01-01 RX ADMIN — DIPHENHYDRAMINE HCL 25 MG: 25 TABLET ORAL at 10:06

## 2020-01-01 RX ADMIN — CHLORHEXIDINE GLUCONATE 0.12% ORAL RINSE 15 ML: 1.2 LIQUID ORAL at 22:04

## 2020-01-01 RX ADMIN — HYDRALAZINE HYDROCHLORIDE 25 MG: 25 TABLET ORAL at 05:24

## 2020-01-01 RX ADMIN — FOLIC ACID 1 MG: 1 TABLET ORAL at 08:17

## 2020-01-01 RX ADMIN — HYDRALAZINE HYDROCHLORIDE 25 MG: 25 TABLET ORAL at 14:56

## 2020-01-01 RX ADMIN — CHLORHEXIDINE GLUCONATE 0.12% ORAL RINSE 15 ML: 1.2 LIQUID ORAL at 09:45

## 2020-01-01 RX ADMIN — METHYLPREDNISOLONE SODIUM SUCCINATE 20 MG: 40 INJECTION, POWDER, FOR SOLUTION INTRAMUSCULAR; INTRAVENOUS at 08:54

## 2020-01-01 RX ADMIN — AMLODIPINE BESYLATE 10 MG: 10 TABLET ORAL at 07:37

## 2020-01-01 RX ADMIN — HYDRALAZINE HYDROCHLORIDE 25 MG: 25 TABLET ORAL at 05:35

## 2020-01-01 RX ADMIN — METRONIDAZOLE 500 MG: 500 INJECTION, SOLUTION INTRAVENOUS at 11:08

## 2020-01-01 RX ADMIN — SENNOSIDES 8.6 MG: 8.6 TABLET, FILM COATED ORAL at 08:55

## 2020-01-01 RX ADMIN — SENNOSIDES 8.6 MG: 8.6 TABLET, FILM COATED ORAL at 17:46

## 2020-01-01 RX ADMIN — NYSTATIN 500000 UNITS: 100000 SUSPENSION ORAL at 15:36

## 2020-01-01 RX ADMIN — ECULIZUMAB 900 MG: 300 INJECTION, SOLUTION, CONCENTRATE INTRAVENOUS at 14:20

## 2020-01-01 RX ADMIN — HEPARIN SODIUM 5000 UNITS: 5000 INJECTION INTRAVENOUS; SUBCUTANEOUS at 22:20

## 2020-01-01 RX ADMIN — NYSTATIN 500000 UNITS: 100000 SUSPENSION ORAL at 22:24

## 2020-01-01 RX ADMIN — Medication: at 01:05

## 2020-01-01 RX ADMIN — DIPHENHYDRAMINE HCL 25 MG: 25 TABLET ORAL at 05:20

## 2020-01-01 RX ADMIN — INSULIN LISPRO 2 UNITS: 100 INJECTION, SOLUTION INTRAVENOUS; SUBCUTANEOUS at 11:19

## 2020-01-01 RX ADMIN — LEVOTHYROXINE SODIUM 88 MCG: 88 TABLET ORAL at 06:27

## 2020-01-01 RX ADMIN — LIDOCAINE HYDROCHLORIDE 15 ML: 20 SOLUTION ORAL; TOPICAL at 08:18

## 2020-01-01 RX ADMIN — AMOXICILLIN 500 MG: 500 CAPSULE ORAL at 22:03

## 2020-01-01 RX ADMIN — SODIUM CHLORIDE, SODIUM GLUCONATE, SODIUM ACETATE, POTASSIUM CHLORIDE, MAGNESIUM CHLORIDE, SODIUM PHOSPHATE, DIBASIC, AND POTASSIUM PHOSPHATE 75 ML/HR: .53; .5; .37; .037; .03; .012; .00082 INJECTION, SOLUTION INTRAVENOUS at 05:52

## 2020-01-01 RX ADMIN — EPINEPHRINE 1 MG: 0.1 INJECTION INTRACARDIAC; INTRAVENOUS at 22:52

## 2020-01-01 RX ADMIN — LIDOCAINE HYDROCHLORIDE 15 ML: 20 SOLUTION ORAL; TOPICAL at 15:37

## 2020-01-01 RX ADMIN — SODIUM CHLORIDE, SODIUM GLUCONATE, SODIUM ACETATE, POTASSIUM CHLORIDE, MAGNESIUM CHLORIDE, SODIUM PHOSPHATE, DIBASIC, AND POTASSIUM PHOSPHATE 75 ML/HR: .53; .5; .37; .037; .03; .012; .00082 INJECTION, SOLUTION INTRAVENOUS at 02:16

## 2020-01-01 RX ADMIN — ANASTROZOLE 1 MG: 1 TABLET, COATED ORAL at 08:56

## 2020-01-01 RX ADMIN — INSULIN LISPRO 1 UNITS: 100 INJECTION, SOLUTION INTRAVENOUS; SUBCUTANEOUS at 16:18

## 2020-01-01 RX ADMIN — METHYLPREDNISOLONE SODIUM SUCCINATE 20 MG: 40 INJECTION, POWDER, FOR SOLUTION INTRAMUSCULAR; INTRAVENOUS at 10:47

## 2020-01-01 RX ADMIN — ACETAMINOPHEN 650 MG: 325 TABLET ORAL at 02:01

## 2020-01-01 RX ADMIN — SENNOSIDES 8.6 MG: 8.6 TABLET, FILM COATED ORAL at 07:36

## 2020-01-01 RX ADMIN — HYDRALAZINE HYDROCHLORIDE 25 MG: 25 TABLET ORAL at 06:27

## 2020-01-01 RX ADMIN — CHLORHEXIDINE GLUCONATE 0.12% ORAL RINSE 15 ML: 1.2 LIQUID ORAL at 22:26

## 2020-01-01 RX ADMIN — LIDOCAINE HYDROCHLORIDE 15 ML: 20 SOLUTION ORAL; TOPICAL at 13:22

## 2020-01-01 RX ADMIN — CHLORHEXIDINE GLUCONATE 0.12% ORAL RINSE 15 ML: 1.2 LIQUID ORAL at 09:51

## 2020-01-01 RX ADMIN — LEVETIRACETAM 500 MG: 500 TABLET, FILM COATED ORAL at 22:54

## 2020-01-01 RX ADMIN — INSULIN LISPRO 1 UNITS: 100 INJECTION, SOLUTION INTRAVENOUS; SUBCUTANEOUS at 12:14

## 2020-01-01 RX ADMIN — EPINEPHRINE 1 MG: 0.1 INJECTION INTRACARDIAC; INTRAVENOUS at 22:49

## 2020-01-01 RX ADMIN — CAPLACIZUMAB 11 MG: KIT at 14:43

## 2020-01-01 RX ADMIN — POLYETHYLENE GLYCOL 3350 17 G: 17 POWDER, FOR SOLUTION ORAL at 07:37

## 2020-01-01 RX ADMIN — FAMOTIDINE 20 MG: 20 TABLET ORAL at 08:45

## 2020-01-01 RX ADMIN — HEPARIN SODIUM 5000 UNITS: 5000 INJECTION INTRAVENOUS; SUBCUTANEOUS at 21:49

## 2020-01-01 RX ADMIN — SODIUM BICARBONATE 50 MEQ: 84 INJECTION, SOLUTION INTRAVENOUS at 23:48

## 2020-01-01 RX ADMIN — MELATONIN 3 MG: at 23:20

## 2020-01-01 RX ADMIN — LEVOTHYROXINE SODIUM 88 MCG: 88 TABLET ORAL at 06:30

## 2020-01-01 RX ADMIN — LEVOTHYROXINE SODIUM 88 MCG: 88 TABLET ORAL at 05:47

## 2020-01-01 RX ADMIN — CEFTRIAXONE SODIUM 1000 MG: 1 INJECTION, POWDER, FOR SOLUTION INTRAMUSCULAR; INTRAVENOUS at 12:16

## 2020-01-01 RX ADMIN — SODIUM CHLORIDE, SODIUM GLUCONATE, SODIUM ACETATE, POTASSIUM CHLORIDE, MAGNESIUM CHLORIDE, SODIUM PHOSPHATE, DIBASIC, AND POTASSIUM PHOSPHATE 75 ML/HR: .53; .5; .37; .037; .03; .012; .00082 INJECTION, SOLUTION INTRAVENOUS at 18:56

## 2020-01-01 RX ADMIN — SODIUM CHLORIDE, SODIUM GLUCONATE, SODIUM ACETATE, POTASSIUM CHLORIDE, MAGNESIUM CHLORIDE, SODIUM PHOSPHATE, DIBASIC, AND POTASSIUM PHOSPHATE 75 ML/HR: .53; .5; .37; .037; .03; .012; .00082 INJECTION, SOLUTION INTRAVENOUS at 20:40

## 2020-01-01 RX ADMIN — ACETAMINOPHEN 650 MG: 325 TABLET ORAL at 05:26

## 2020-01-01 RX ADMIN — FOLIC ACID 1 MG: 1 TABLET ORAL at 10:39

## 2020-01-01 RX ADMIN — Medication 50 MCG/HR: at 15:46

## 2020-01-01 RX ADMIN — CHLORHEXIDINE GLUCONATE 0.12% ORAL RINSE 15 ML: 1.2 LIQUID ORAL at 23:04

## 2020-01-01 RX ADMIN — HYDRALAZINE HYDROCHLORIDE 25 MG: 25 TABLET ORAL at 13:03

## 2020-01-01 RX ADMIN — FENTANYL CITRATE 50 MCG: 50 INJECTION, SOLUTION INTRAMUSCULAR; INTRAVENOUS at 13:18

## 2020-01-01 RX ADMIN — METHYLPREDNISOLONE SODIUM SUCCINATE 20 MG: 40 INJECTION, POWDER, FOR SOLUTION INTRAMUSCULAR; INTRAVENOUS at 09:29

## 2020-01-01 RX ADMIN — Medication 50 MCG/HR: at 13:44

## 2020-01-01 RX ADMIN — FAMOTIDINE 40 MG: 40 POWDER, FOR SUSPENSION ORAL at 08:34

## 2020-01-01 RX ADMIN — HYDRALAZINE HYDROCHLORIDE 25 MG: 25 TABLET ORAL at 23:00

## 2020-01-01 RX ADMIN — NYSTATIN 500000 UNITS: 100000 SUSPENSION ORAL at 00:20

## 2020-01-01 RX ADMIN — HEPARIN SODIUM 5000 UNITS: 5000 INJECTION INTRAVENOUS; SUBCUTANEOUS at 14:54

## 2020-01-01 RX ADMIN — AMLODIPINE BESYLATE 10 MG: 10 TABLET ORAL at 09:19

## 2020-01-01 RX ADMIN — ALPRAZOLAM 0.5 MG: 0.5 TABLET ORAL at 05:33

## 2020-01-01 RX ADMIN — LIDOCAINE HYDROCHLORIDE,EPINEPHRINE BITARTRATE 10 ML: 10; .01 INJECTION, SOLUTION INFILTRATION; PERINEURAL at 09:29

## 2020-01-01 RX ADMIN — LEVOTHYROXINE SODIUM 88 MCG: 88 TABLET ORAL at 05:08

## 2020-01-01 RX ADMIN — LEVETIRACETAM 500 MG: 500 TABLET, FILM COATED ORAL at 08:06

## 2020-01-01 RX ADMIN — CALCIUM GLUCONATE 1 G: 20 INJECTION, SOLUTION INTRAVENOUS at 17:08

## 2020-01-01 RX ADMIN — NYSTATIN 500000 UNITS: 100000 SUSPENSION ORAL at 22:04

## 2020-01-01 RX ADMIN — FOLIC ACID 1 MG: 1 TABLET ORAL at 08:12

## 2020-01-01 RX ADMIN — ONDANSETRON 4 MG: 2 INJECTION INTRAMUSCULAR; INTRAVENOUS at 08:03

## 2020-01-01 RX ADMIN — HYDRALAZINE HYDROCHLORIDE 25 MG: 25 TABLET ORAL at 05:11

## 2020-01-01 RX ADMIN — FAMOTIDINE 20 MG: 20 TABLET ORAL at 09:55

## 2020-01-01 RX ADMIN — IOHEXOL 85 ML: 350 INJECTION, SOLUTION INTRAVENOUS at 20:35

## 2020-01-01 RX ADMIN — LEVOTHYROXINE SODIUM 88 MCG: 88 TABLET ORAL at 05:06

## 2020-01-01 RX ADMIN — LIDOCAINE HYDROCHLORIDE 15 ML: 20 SOLUTION ORAL; TOPICAL at 18:30

## 2020-01-01 RX ADMIN — ANASTROZOLE 1 MG: 1 TABLET, COATED ORAL at 08:43

## 2020-01-01 RX ADMIN — CALCIUM GLUCONATE 1 G: 20 INJECTION, SOLUTION INTRAVENOUS at 10:45

## 2020-01-01 RX ADMIN — CAPLACIZUMAB 11 MG: KIT at 11:13

## 2020-01-01 RX ADMIN — LEVOTHYROXINE SODIUM 88 MCG: 88 TABLET ORAL at 05:04

## 2020-01-01 RX ADMIN — ONDANSETRON 4 MG: 2 INJECTION INTRAMUSCULAR; INTRAVENOUS at 09:28

## 2020-01-01 RX ADMIN — ONDANSETRON 4 MG: 2 INJECTION INTRAMUSCULAR; INTRAVENOUS at 20:11

## 2020-01-01 RX ADMIN — LIDOCAINE HYDROCHLORIDE 15 ML: 20 SOLUTION ORAL; TOPICAL at 11:45

## 2020-01-01 RX ADMIN — FAMOTIDINE 20 MG: 20 TABLET ORAL at 08:14

## 2020-01-01 RX ADMIN — NYSTATIN 500000 UNITS: 100000 SUSPENSION ORAL at 17:00

## 2020-01-01 RX ADMIN — ACETAMINOPHEN 650 MG: 325 TABLET ORAL at 03:13

## 2020-01-01 RX ADMIN — CHLORHEXIDINE GLUCONATE 0.12% ORAL RINSE 15 ML: 1.2 LIQUID ORAL at 21:24

## 2020-01-01 RX ADMIN — INSULIN LISPRO 1 UNITS: 100 INJECTION, SOLUTION INTRAVENOUS; SUBCUTANEOUS at 22:20

## 2020-01-01 RX ADMIN — LEVETIRACETAM 500 MG: 100 INJECTION, SOLUTION INTRAVENOUS at 09:52

## 2020-01-01 RX ADMIN — CHLORHEXIDINE GLUCONATE 0.12% ORAL RINSE 15 ML: 1.2 LIQUID ORAL at 10:35

## 2020-01-01 RX ADMIN — CALCIUM GLUCONATE 1 G: 20 INJECTION, SOLUTION INTRAVENOUS at 17:52

## 2020-01-01 RX ADMIN — MIDAZOLAM 0.5 MG: 1 INJECTION INTRAMUSCULAR; INTRAVENOUS at 09:33

## 2020-01-01 RX ADMIN — METOCLOPRAMIDE 10 MG: 5 INJECTION, SOLUTION INTRAMUSCULAR; INTRAVENOUS at 22:09

## 2020-01-01 RX ADMIN — MELATONIN 3 MG: at 21:34

## 2020-01-01 RX ADMIN — HYDRALAZINE HYDROCHLORIDE 25 MG: 25 TABLET ORAL at 13:29

## 2020-01-01 RX ADMIN — LIDOCAINE HYDROCHLORIDE 15 ML: 20 SOLUTION ORAL; TOPICAL at 18:34

## 2020-01-01 RX ADMIN — METHYLPREDNISOLONE SODIUM SUCCINATE 20 MG: 40 INJECTION, POWDER, FOR SOLUTION INTRAMUSCULAR; INTRAVENOUS at 08:14

## 2020-01-01 RX ADMIN — CHLORHEXIDINE GLUCONATE 0.12% ORAL RINSE 15 ML: 1.2 LIQUID ORAL at 08:55

## 2020-01-01 RX ADMIN — LEVETIRACETAM 750 MG: 100 INJECTION, SOLUTION INTRAVENOUS at 22:29

## 2020-01-01 RX ADMIN — POLYETHYLENE GLYCOL 3350 17 G: 17 POWDER, FOR SOLUTION ORAL at 10:41

## 2020-01-01 RX ADMIN — NYSTATIN 500000 UNITS: 100000 SUSPENSION ORAL at 18:02

## 2020-01-01 RX ADMIN — FOLIC ACID 1 MG: 1 TABLET ORAL at 08:33

## 2020-01-01 RX ADMIN — ACETAMINOPHEN 650 MG: 325 TABLET ORAL at 09:19

## 2020-01-01 RX ADMIN — HYDRALAZINE HYDROCHLORIDE 25 MG: 25 TABLET ORAL at 15:42

## 2020-01-01 RX ADMIN — POLYETHYLENE GLYCOL 3350 17 G: 17 POWDER, FOR SOLUTION ORAL at 10:40

## 2020-01-01 RX ADMIN — HYDRALAZINE HYDROCHLORIDE 25 MG: 20 INJECTION INTRAMUSCULAR; INTRAVENOUS at 12:10

## 2020-01-01 RX ADMIN — SODIUM CHLORIDE, SODIUM GLUCONATE, SODIUM ACETATE, POTASSIUM CHLORIDE, MAGNESIUM CHLORIDE, SODIUM PHOSPHATE, DIBASIC, AND POTASSIUM PHOSPHATE 50 ML/HR: .53; .5; .37; .037; .03; .012; .00082 INJECTION, SOLUTION INTRAVENOUS at 20:23

## 2020-01-01 RX ADMIN — ONDANSETRON 4 MG: 2 INJECTION INTRAMUSCULAR; INTRAVENOUS at 07:46

## 2020-01-01 RX ADMIN — ETOMIDATE 40 MG: 20 INJECTION, SOLUTION INTRAVENOUS at 19:39

## 2020-01-01 RX ADMIN — HEPARIN SODIUM 5000 UNITS: 5000 INJECTION INTRAVENOUS; SUBCUTANEOUS at 22:58

## 2020-01-01 RX ADMIN — SODIUM CHLORIDE, SODIUM GLUCONATE, SODIUM ACETATE, POTASSIUM CHLORIDE, MAGNESIUM CHLORIDE, SODIUM PHOSPHATE, DIBASIC, AND POTASSIUM PHOSPHATE 75 ML/HR: .53; .5; .37; .037; .03; .012; .00082 INJECTION, SOLUTION INTRAVENOUS at 00:15

## 2020-01-01 RX ADMIN — MELATONIN 3 MG: at 21:42

## 2020-01-01 RX ADMIN — INSULIN LISPRO 3 UNITS: 100 INJECTION, SOLUTION INTRAVENOUS; SUBCUTANEOUS at 17:13

## 2020-01-01 RX ADMIN — AMLODIPINE BESYLATE 10 MG: 10 TABLET ORAL at 10:46

## 2020-01-01 RX ADMIN — INSULIN LISPRO 1 UNITS: 100 INJECTION, SOLUTION INTRAVENOUS; SUBCUTANEOUS at 21:44

## 2020-01-01 RX ADMIN — PROPOFOL 50 MCG/KG/MIN: 10 INJECTION, EMULSION INTRAVENOUS at 00:46

## 2020-01-01 RX ADMIN — LIDOCAINE HYDROCHLORIDE 15 ML: 20 SOLUTION ORAL; TOPICAL at 17:12

## 2020-01-01 RX ADMIN — MELATONIN 3 MG: at 22:29

## 2020-01-01 RX ADMIN — HEPARIN SODIUM 5000 UNITS: 5000 INJECTION INTRAVENOUS; SUBCUTANEOUS at 05:08

## 2020-01-01 RX ADMIN — POLYETHYLENE GLYCOL 3350 17 G: 17 POWDER, FOR SOLUTION ORAL at 08:43

## 2020-01-01 RX ADMIN — AMLODIPINE BESYLATE 10 MG: 10 TABLET ORAL at 08:32

## 2020-01-01 RX ADMIN — HEPARIN SODIUM 5000 UNITS: 5000 INJECTION INTRAVENOUS; SUBCUTANEOUS at 21:42

## 2020-01-01 RX ADMIN — METHYLPREDNISOLONE SODIUM SUCCINATE 20 MG: 40 INJECTION, POWDER, FOR SOLUTION INTRAMUSCULAR; INTRAVENOUS at 08:17

## 2020-01-01 RX ADMIN — MELATONIN 3 MG: at 23:00

## 2020-01-01 RX ADMIN — CHLORHEXIDINE GLUCONATE 0.12% ORAL RINSE 15 ML: 1.2 LIQUID ORAL at 08:04

## 2020-01-01 RX ADMIN — HYDRALAZINE HYDROCHLORIDE 25 MG: 25 TABLET ORAL at 13:17

## 2020-01-01 RX ADMIN — LIDOCAINE HYDROCHLORIDE 15 ML: 20 SOLUTION ORAL; TOPICAL at 17:13

## 2020-01-01 RX ADMIN — ACETAMINOPHEN 650 MG: 325 TABLET ORAL at 21:48

## 2020-01-01 RX ADMIN — NEISSERIA MENINGITIDIS GROUP A CAPSULAR POLYSACCHARIDE DIPHTHERIA TOXOID CONJUGATE ANTIGEN, NEISSERIA MENINGITIDIS GROUP C CAPSULAR POLYSACCHARIDE DIPHTHERIA TOXOID CONJUGATE ANTIGEN, NEISSERIA MENINGITIDIS GROUP Y CAPSULAR POLYSACCHARIDE DIPHTHERIA TOXOID CONJUGATE ANTIGEN, AND NEISSERIA MENINGITIDIS GROUP W-135 CAPSULAR POLYSACCHARIDE DIPHTHERIA TOXOID CONJUGATE ANTIGEN 0.5 ML: 4; 4; 4; 4 INJECTION, SOLUTION INTRAMUSCULAR at 14:56

## 2020-01-01 RX ADMIN — ACETAMINOPHEN 650 MG: 325 TABLET ORAL at 11:12

## 2020-01-01 RX ADMIN — CHLORHEXIDINE GLUCONATE 0.12% ORAL RINSE 15 ML: 1.2 LIQUID ORAL at 08:15

## 2020-01-01 RX ADMIN — METRONIDAZOLE 500 MG: 500 INJECTION, SOLUTION INTRAVENOUS at 20:09

## 2020-01-01 RX ADMIN — ETOMIDATE 20 MG: 20 INJECTION, SOLUTION INTRAVENOUS at 22:26

## 2020-01-01 RX ADMIN — CHLORHEXIDINE GLUCONATE 0.12% ORAL RINSE 15 ML: 1.2 LIQUID ORAL at 10:48

## 2020-01-01 RX ADMIN — HEPARIN SODIUM 5000 UNITS: 5000 INJECTION INTRAVENOUS; SUBCUTANEOUS at 22:00

## 2020-01-01 RX ADMIN — DIPHENHYDRAMINE HCL 25 MG: 25 TABLET ORAL at 08:45

## 2020-01-01 RX ADMIN — POLYETHYLENE GLYCOL 3350 17 G: 17 POWDER, FOR SOLUTION ORAL at 09:29

## 2020-01-01 RX ADMIN — SODIUM CHLORIDE, SODIUM GLUCONATE, SODIUM ACETATE, POTASSIUM CHLORIDE, MAGNESIUM CHLORIDE, SODIUM PHOSPHATE, DIBASIC, AND POTASSIUM PHOSPHATE 50 ML/HR: .53; .5; .37; .037; .03; .012; .00082 INJECTION, SOLUTION INTRAVENOUS at 16:28

## 2020-01-01 RX ADMIN — SENNOSIDES 8.6 MG: 8.6 TABLET, FILM COATED ORAL at 08:44

## 2020-01-01 RX ADMIN — CHLORHEXIDINE GLUCONATE 0.12% ORAL RINSE 15 ML: 1.2 LIQUID ORAL at 10:00

## 2020-01-01 RX ADMIN — ONDANSETRON 4 MG: 2 INJECTION INTRAMUSCULAR; INTRAVENOUS at 16:52

## 2020-01-01 RX ADMIN — AMLODIPINE BESYLATE 10 MG: 10 TABLET ORAL at 09:29

## 2020-01-01 RX ADMIN — LEVOTHYROXINE SODIUM 88 MCG: 88 TABLET ORAL at 06:34

## 2020-01-01 RX ADMIN — LEVETIRACETAM 500 MG: 100 INJECTION, SOLUTION INTRAVENOUS at 21:44

## 2020-01-01 RX ADMIN — ACETAMINOPHEN 650 MG: 325 TABLET ORAL at 04:15

## 2020-01-01 RX ADMIN — LEVETIRACETAM 500 MG: 100 INJECTION, SOLUTION INTRAVENOUS at 08:04

## 2020-01-01 RX ADMIN — CAPLACIZUMAB 11 MG: KIT at 18:03

## 2020-01-01 RX ADMIN — CHLORHEXIDINE GLUCONATE 0.12% ORAL RINSE 15 ML: 1.2 LIQUID ORAL at 10:39

## 2020-01-01 RX ADMIN — METHYLPREDNISOLONE SODIUM SUCCINATE 40 MG: 40 INJECTION, POWDER, FOR SOLUTION INTRAMUSCULAR; INTRAVENOUS at 08:55

## 2020-01-01 RX ADMIN — SODIUM CHLORIDE 75 ML/HR: 0.9 INJECTION, SOLUTION INTRAVENOUS at 00:47

## 2020-01-01 RX ADMIN — HEPARIN SODIUM 5000 UNITS: 5000 INJECTION INTRAVENOUS; SUBCUTANEOUS at 05:47

## 2020-01-01 RX ADMIN — METOCLOPRAMIDE 10 MG: 5 INJECTION, SOLUTION INTRAMUSCULAR; INTRAVENOUS at 09:52

## 2020-01-01 RX ADMIN — HYDRALAZINE HYDROCHLORIDE 25 MG: 25 TABLET ORAL at 07:43

## 2020-01-01 RX ADMIN — VANCOMYCIN HYDROCHLORIDE 1000 MG: 1 INJECTION, SOLUTION INTRAVENOUS at 07:45

## 2020-01-01 RX ADMIN — SODIUM CHLORIDE 75 ML/HR: 0.9 INJECTION, SOLUTION INTRAVENOUS at 21:04

## 2020-01-01 RX ADMIN — LEVETIRACETAM 500 MG: 500 TABLET, FILM COATED ORAL at 08:33

## 2020-01-01 RX ADMIN — METHYLPREDNISOLONE SODIUM SUCCINATE 20 MG: 40 INJECTION, POWDER, FOR SOLUTION INTRAMUSCULAR; INTRAVENOUS at 09:51

## 2020-01-01 RX ADMIN — HYDRALAZINE HYDROCHLORIDE 25 MG: 25 TABLET ORAL at 05:19

## 2020-01-01 RX ADMIN — Medication: at 00:30

## 2020-01-01 RX ADMIN — LEVOTHYROXINE SODIUM 88 MCG: 88 TABLET ORAL at 05:28

## 2020-01-01 RX ADMIN — HYDRALAZINE HYDROCHLORIDE 25 MG: 25 TABLET ORAL at 23:38

## 2020-01-01 RX ADMIN — Medication 50 MCG/HR: at 00:53

## 2020-01-01 RX ADMIN — LEVETIRACETAM 500 MG: 500 TABLET, FILM COATED ORAL at 20:28

## 2020-01-01 RX ADMIN — FENTANYL CITRATE 25 MCG: 50 INJECTION, SOLUTION INTRAMUSCULAR; INTRAVENOUS at 15:12

## 2020-01-01 RX ADMIN — ONDANSETRON 4 MG: 2 INJECTION INTRAMUSCULAR; INTRAVENOUS at 14:24

## 2020-01-01 RX ADMIN — NYSTATIN 500000 UNITS: 100000 SUSPENSION ORAL at 10:39

## 2020-01-01 RX ADMIN — FOLIC ACID 1 MG: 1 TABLET ORAL at 08:44

## 2020-01-01 RX ADMIN — HYDRALAZINE HYDROCHLORIDE 25 MG: 25 TABLET ORAL at 21:44

## 2020-01-01 RX ADMIN — LEVETIRACETAM 750 MG: 750 TABLET ORAL at 23:20

## 2020-01-01 RX ADMIN — AMLODIPINE BESYLATE 10 MG: 10 TABLET ORAL at 09:57

## 2020-01-01 RX ADMIN — LEVETIRACETAM 500 MG: 500 TABLET, FILM COATED ORAL at 08:44

## 2020-01-01 RX ADMIN — POTASSIUM CHLORIDE 60 MEQ: 20 SOLUTION ORAL at 00:58

## 2020-01-01 RX ADMIN — PANTOPRAZOLE SODIUM 40 MG: 40 TABLET, DELAYED RELEASE ORAL at 06:36

## 2020-01-01 RX ADMIN — DIPHENHYDRAMINE HCL 25 MG: 25 TABLET ORAL at 11:12

## 2020-01-01 RX ADMIN — SENNOSIDES 8.6 MG: 8.6 TABLET, FILM COATED ORAL at 18:00

## 2020-01-01 RX ADMIN — NYSTATIN 500000 UNITS: 100000 SUSPENSION ORAL at 08:53

## 2020-01-01 RX ADMIN — LIDOCAINE HYDROCHLORIDE 15 ML: 20 SOLUTION ORAL; TOPICAL at 05:20

## 2020-01-01 RX ADMIN — PROPOFOL 50 MCG/KG/MIN: 10 INJECTION, EMULSION INTRAVENOUS at 19:38

## 2020-01-01 RX ADMIN — SODIUM CHLORIDE, SODIUM GLUCONATE, SODIUM ACETATE, POTASSIUM CHLORIDE, MAGNESIUM CHLORIDE, SODIUM PHOSPHATE, DIBASIC, AND POTASSIUM PHOSPHATE 50 ML/HR: .53; .5; .37; .037; .03; .012; .00082 INJECTION, SOLUTION INTRAVENOUS at 16:15

## 2020-01-01 RX ADMIN — LIDOCAINE HYDROCHLORIDE 15 ML: 20 SOLUTION ORAL; TOPICAL at 08:45

## 2020-01-01 RX ADMIN — LEVOTHYROXINE SODIUM 88 MCG: 88 TABLET ORAL at 05:18

## 2020-01-01 RX ADMIN — MELATONIN 3 MG: at 22:02

## 2020-01-01 RX ADMIN — POLYETHYLENE GLYCOL 3350 17 G: 17 POWDER, FOR SOLUTION ORAL at 10:52

## 2020-01-01 RX ADMIN — LIDOCAINE HYDROCHLORIDE 15 ML: 20 SOLUTION ORAL; TOPICAL at 06:34

## 2020-01-01 RX ADMIN — FENTANYL CITRATE 25 MCG: 50 INJECTION, SOLUTION INTRAMUSCULAR; INTRAVENOUS at 09:30

## 2020-01-01 RX ADMIN — CHLORHEXIDINE GLUCONATE 0.12% ORAL RINSE 15 ML: 1.2 LIQUID ORAL at 02:31

## 2020-01-01 RX ADMIN — LEVETIRACETAM 500 MG: 500 TABLET, FILM COATED ORAL at 10:08

## 2020-01-01 RX ADMIN — ACETAMINOPHEN 650 MG: 325 TABLET ORAL at 09:50

## 2020-01-01 RX ADMIN — ONDANSETRON 4 MG: 2 INJECTION INTRAMUSCULAR; INTRAVENOUS at 10:41

## 2020-01-01 RX ADMIN — ALPRAZOLAM 0.5 MG: 0.5 TABLET ORAL at 09:50

## 2020-01-01 RX ADMIN — CHLORHEXIDINE GLUCONATE 0.12% ORAL RINSE 15 ML: 1.2 LIQUID ORAL at 22:19

## 2020-01-01 RX ADMIN — SENNOSIDES 8.6 MG: 8.6 TABLET, FILM COATED ORAL at 08:33

## 2020-01-01 RX ADMIN — CALCIUM GLUCONATE 1 G: 20 INJECTION, SOLUTION INTRAVENOUS at 08:31

## 2020-01-01 RX ADMIN — POLYETHYLENE GLYCOL 3350 17 G: 17 POWDER, FOR SOLUTION ORAL at 08:18

## 2020-01-01 RX ADMIN — LIDOCAINE HYDROCHLORIDE 15 ML: 20 SOLUTION ORAL; TOPICAL at 05:35

## 2020-01-01 RX ADMIN — HEPARIN SODIUM 5000 UNITS: 5000 INJECTION INTRAVENOUS; SUBCUTANEOUS at 22:25

## 2020-01-01 RX ADMIN — HYDRALAZINE HYDROCHLORIDE 5 MG: 20 INJECTION INTRAMUSCULAR; INTRAVENOUS at 21:54

## 2020-01-01 RX ADMIN — CHLORHEXIDINE GLUCONATE 0.12% ORAL RINSE 15 ML: 1.2 LIQUID ORAL at 10:12

## 2020-01-01 RX ADMIN — LABETALOL 20 MG/4 ML (5 MG/ML) INTRAVENOUS SYRINGE 10 MG: at 14:02

## 2020-01-01 RX ADMIN — SODIUM CHLORIDE 500 ML: 0.9 INJECTION, SOLUTION INTRAVENOUS at 12:03

## 2020-01-01 RX ADMIN — ACETAMINOPHEN 650 MG: 325 TABLET ORAL at 15:14

## 2020-01-01 RX ADMIN — ONDANSETRON 4 MG: 2 INJECTION INTRAMUSCULAR; INTRAVENOUS at 20:45

## 2020-01-01 RX ADMIN — LIDOCAINE HYDROCHLORIDE 15 ML: 20 SOLUTION ORAL; TOPICAL at 12:01

## 2020-01-01 RX ADMIN — METRONIDAZOLE 500 MG: 500 INJECTION, SOLUTION INTRAVENOUS at 04:31

## 2020-01-01 RX ADMIN — NYSTATIN 500000 UNITS: 100000 SUSPENSION ORAL at 12:53

## 2020-01-01 RX ADMIN — HYDRALAZINE HYDROCHLORIDE 25 MG: 25 TABLET ORAL at 21:57

## 2020-01-01 RX ADMIN — LEVETIRACETAM 750 MG: 750 TABLET ORAL at 23:04

## 2020-01-01 RX ADMIN — POLYETHYLENE GLYCOL 3350 17 G: 17 POWDER, FOR SOLUTION ORAL at 08:46

## 2020-01-01 RX ADMIN — HYDRALAZINE HYDROCHLORIDE 25 MG: 25 TABLET ORAL at 05:47

## 2020-01-01 RX ADMIN — FAMOTIDINE 20 MG: 20 TABLET ORAL at 10:48

## 2020-01-01 RX ADMIN — HEPARIN SODIUM 5000 UNITS: 5000 INJECTION INTRAVENOUS; SUBCUTANEOUS at 16:16

## 2020-01-01 RX ADMIN — CHLORHEXIDINE GLUCONATE 0.12% ORAL RINSE 15 ML: 1.2 LIQUID ORAL at 08:12

## 2020-01-01 RX ADMIN — SENNOSIDES 8.6 MG: 8.6 TABLET, FILM COATED ORAL at 08:54

## 2020-01-01 RX ADMIN — CAPLACIZUMAB 11 MG: KIT at 19:32

## 2020-01-01 RX ADMIN — LEVETIRACETAM 750 MG: 750 TABLET ORAL at 22:26

## 2020-01-01 RX ADMIN — EPINEPHRINE 10 MCG/MIN: 1 INJECTION, SOLUTION, CONCENTRATE INTRAVENOUS at 23:45

## 2020-01-01 RX ADMIN — HEPARIN SODIUM 5000 UNITS: 5000 INJECTION INTRAVENOUS; SUBCUTANEOUS at 06:17

## 2020-01-01 RX ADMIN — METOCLOPRAMIDE 10 MG: 5 INJECTION, SOLUTION INTRAMUSCULAR; INTRAVENOUS at 05:26

## 2020-01-01 RX ADMIN — AMLODIPINE BESYLATE 10 MG: 10 TABLET ORAL at 10:06

## 2020-01-01 RX ADMIN — LEVOTHYROXINE SODIUM 88 MCG: 88 TABLET ORAL at 05:49

## 2020-01-01 RX ADMIN — FAMOTIDINE 20 MG: 10 INJECTION, SOLUTION INTRAVENOUS at 15:34

## 2020-01-01 RX ADMIN — METHYLPREDNISOLONE SODIUM SUCCINATE 20 MG: 40 INJECTION, POWDER, FOR SOLUTION INTRAMUSCULAR; INTRAVENOUS at 09:55

## 2020-01-01 RX ADMIN — LEVOTHYROXINE SODIUM 88 MCG: 88 TABLET ORAL at 06:20

## 2020-01-01 RX ADMIN — NYSTATIN 500000 UNITS: 100000 SUSPENSION ORAL at 08:06

## 2020-01-01 RX ADMIN — HYDRALAZINE HYDROCHLORIDE 25 MG: 25 TABLET ORAL at 22:02

## 2020-01-01 RX ADMIN — FAMOTIDINE 20 MG: 20 TABLET ORAL at 10:55

## 2020-01-01 RX ADMIN — LEVETIRACETAM 500 MG: 500 TABLET, FILM COATED ORAL at 09:55

## 2020-01-01 RX ADMIN — AMOXICILLIN 500 MG: 500 CAPSULE ORAL at 21:34

## 2020-01-01 RX ADMIN — CHLORHEXIDINE GLUCONATE 0.12% ORAL RINSE 15 ML: 1.2 LIQUID ORAL at 09:55

## 2020-01-01 RX ADMIN — INSULIN LISPRO 1 UNITS: 100 INJECTION, SOLUTION INTRAVENOUS; SUBCUTANEOUS at 12:12

## 2020-01-01 RX ADMIN — SODIUM CHLORIDE, SODIUM GLUCONATE, SODIUM ACETATE, POTASSIUM CHLORIDE, MAGNESIUM CHLORIDE, SODIUM PHOSPHATE, DIBASIC, AND POTASSIUM PHOSPHATE 50 ML/HR: .53; .5; .37; .037; .03; .012; .00082 INJECTION, SOLUTION INTRAVENOUS at 19:42

## 2020-01-01 RX ADMIN — CEFTRIAXONE SODIUM 2000 MG: 10 INJECTION, POWDER, FOR SOLUTION INTRAVENOUS at 15:00

## 2020-01-01 RX ADMIN — POLYETHYLENE GLYCOL 3350 17 G: 17 POWDER, FOR SOLUTION ORAL at 08:56

## 2020-01-01 RX ADMIN — HEPARIN SODIUM 5000 UNITS: 5000 INJECTION INTRAVENOUS; SUBCUTANEOUS at 05:19

## 2020-01-01 RX ADMIN — LEVOTHYROXINE SODIUM 88 MCG: 88 TABLET ORAL at 06:36

## 2020-01-01 RX ADMIN — AMLODIPINE BESYLATE 10 MG: 10 TABLET ORAL at 08:06

## 2020-01-01 RX ADMIN — NYSTATIN 500000 UNITS: 100000 SUSPENSION ORAL at 17:46

## 2020-01-01 RX ADMIN — FOLIC ACID 1 MG: 1 TABLET ORAL at 08:46

## 2020-01-01 RX ADMIN — INSULIN LISPRO 1 UNITS: 100 INJECTION, SOLUTION INTRAVENOUS; SUBCUTANEOUS at 22:29

## 2020-01-01 RX ADMIN — CHLORHEXIDINE GLUCONATE 0.12% ORAL RINSE 15 ML: 1.2 LIQUID ORAL at 08:17

## 2020-01-01 RX ADMIN — HYDRALAZINE HYDROCHLORIDE 10 MG: 20 INJECTION INTRAMUSCULAR; INTRAVENOUS at 15:33

## 2020-01-01 RX ADMIN — LEVETIRACETAM 500 MG: 100 INJECTION, SOLUTION INTRAVENOUS at 21:58

## 2020-01-01 RX ADMIN — LEVETIRACETAM 500 MG: 100 INJECTION, SOLUTION INTRAVENOUS at 08:17

## 2020-01-01 RX ADMIN — FOLIC ACID 1 MG: 1 TABLET ORAL at 10:54

## 2020-01-01 RX ADMIN — METHYLPREDNISOLONE SODIUM SUCCINATE 40 MG: 40 INJECTION, POWDER, FOR SOLUTION INTRAMUSCULAR; INTRAVENOUS at 18:31

## 2020-01-01 RX ADMIN — HYDRALAZINE HYDROCHLORIDE 25 MG: 25 TABLET ORAL at 14:48

## 2020-01-01 RX ADMIN — METHYLPREDNISOLONE SODIUM SUCCINATE 40 MG: 40 INJECTION, POWDER, FOR SOLUTION INTRAMUSCULAR; INTRAVENOUS at 17:52

## 2020-01-01 RX ADMIN — LEVETIRACETAM 500 MG: 500 TABLET, FILM COATED ORAL at 08:12

## 2020-01-01 RX ADMIN — METOCLOPRAMIDE 10 MG: 5 INJECTION, SOLUTION INTRAMUSCULAR; INTRAVENOUS at 20:40

## 2020-01-01 RX ADMIN — FAMOTIDINE 20 MG: 20 TABLET ORAL at 08:06

## 2020-01-01 RX ADMIN — FENTANYL CITRATE 50 MCG: 50 INJECTION, SOLUTION INTRAMUSCULAR; INTRAVENOUS at 13:34

## 2020-01-01 RX ADMIN — LEVETIRACETAM 500 MG: 100 INJECTION, SOLUTION INTRAVENOUS at 21:53

## 2020-01-01 RX ADMIN — FOLIC ACID 1 MG: 1 TABLET ORAL at 08:56

## 2020-01-01 RX ADMIN — CHLORHEXIDINE GLUCONATE 0.12% ORAL RINSE 15 ML: 1.2 LIQUID ORAL at 22:28

## 2020-01-01 RX ADMIN — MELATONIN 3 MG: at 21:48

## 2020-01-01 RX ADMIN — MELATONIN 3 MG: at 22:20

## 2020-01-01 RX ADMIN — MAGNESIUM HYDROXIDE 30 ML: 400 SUSPENSION ORAL at 06:30

## 2020-01-01 RX ADMIN — LIDOCAINE HYDROCHLORIDE 15 ML: 20 SOLUTION ORAL; TOPICAL at 10:12

## 2020-01-01 RX ADMIN — FAMOTIDINE 20 MG: 20 TABLET ORAL at 09:19

## 2020-01-01 RX ADMIN — LEVOTHYROXINE SODIUM 88 MCG: 88 TABLET ORAL at 06:03

## 2020-01-01 RX ADMIN — METHYLPREDNISOLONE SODIUM SUCCINATE 20 MG: 40 INJECTION, POWDER, FOR SOLUTION INTRAMUSCULAR; INTRAVENOUS at 21:26

## 2020-01-01 RX ADMIN — POLYETHYLENE GLYCOL 3350 17 G: 17 POWDER, FOR SOLUTION ORAL at 08:03

## 2020-01-01 RX ADMIN — METHYLPREDNISOLONE SODIUM SUCCINATE 20 MG: 40 INJECTION, POWDER, FOR SOLUTION INTRAMUSCULAR; INTRAVENOUS at 08:12

## 2020-01-01 RX ADMIN — HYDRALAZINE HYDROCHLORIDE 25 MG: 25 TABLET ORAL at 21:34

## 2020-01-01 RX ADMIN — VECURONIUM BROMIDE 10 MG: 1 INJECTION, POWDER, LYOPHILIZED, FOR SOLUTION INTRAVENOUS at 22:29

## 2020-01-01 RX ADMIN — ACETAMINOPHEN 650 MG: 325 TABLET ORAL at 01:22

## 2020-01-01 RX ADMIN — HYDRALAZINE HYDROCHLORIDE 25 MG: 25 TABLET ORAL at 22:54

## 2020-01-01 RX ADMIN — NYSTATIN 500000 UNITS: 100000 SUSPENSION ORAL at 22:02

## 2020-01-01 RX ADMIN — SODIUM CHLORIDE, SODIUM GLUCONATE, SODIUM ACETATE, POTASSIUM CHLORIDE, MAGNESIUM CHLORIDE, SODIUM PHOSPHATE, DIBASIC, AND POTASSIUM PHOSPHATE 50 ML/HR: .53; .5; .37; .037; .03; .012; .00082 INJECTION, SOLUTION INTRAVENOUS at 05:11

## 2020-01-01 RX ADMIN — LEVOTHYROXINE SODIUM 88 MCG: 88 TABLET ORAL at 05:36

## 2020-01-01 RX ADMIN — HYDRALAZINE HYDROCHLORIDE 25 MG: 25 TABLET ORAL at 13:22

## 2020-01-01 RX ADMIN — LEVETIRACETAM 750 MG: 750 TABLET ORAL at 21:24

## 2020-01-01 RX ADMIN — HYDRALAZINE HYDROCHLORIDE 25 MG: 25 TABLET ORAL at 21:25

## 2020-01-01 RX ADMIN — CEFTRIAXONE SODIUM 1000 MG: 1 INJECTION, POWDER, FOR SOLUTION INTRAMUSCULAR; INTRAVENOUS at 13:19

## 2020-01-01 RX ADMIN — METHYLPREDNISOLONE SODIUM SUCCINATE 20 MG: 40 INJECTION, POWDER, FOR SOLUTION INTRAMUSCULAR; INTRAVENOUS at 10:00

## 2020-01-01 RX ADMIN — ACETAMINOPHEN 650 MG: 325 TABLET ORAL at 05:34

## 2020-01-01 RX ADMIN — AMLODIPINE BESYLATE 10 MG: 10 TABLET ORAL at 09:51

## 2020-01-01 RX ADMIN — HEPARIN SODIUM 5000 UNITS: 5000 INJECTION INTRAVENOUS; SUBCUTANEOUS at 13:29

## 2020-01-01 RX ADMIN — HYDRALAZINE HYDROCHLORIDE 10 MG: 20 INJECTION INTRAMUSCULAR; INTRAVENOUS at 05:58

## 2020-01-01 RX ADMIN — MELATONIN 3 MG: at 21:24

## 2020-01-01 RX ADMIN — SODIUM BICARBONATE 50 MEQ: 84 INJECTION INTRAVENOUS at 22:51

## 2020-01-01 RX ADMIN — METHYLPREDNISOLONE SODIUM SUCCINATE 20 MG: 40 INJECTION, POWDER, FOR SOLUTION INTRAMUSCULAR; INTRAVENOUS at 07:37

## 2020-01-01 RX ADMIN — AMLODIPINE BESYLATE 10 MG: 10 TABLET ORAL at 08:17

## 2020-01-01 RX ADMIN — MELATONIN 3 MG: at 21:44

## 2020-01-01 RX ADMIN — LEVETIRACETAM 500 MG: 500 TABLET, FILM COATED ORAL at 08:14

## 2020-01-01 RX ADMIN — CEFEPIME HYDROCHLORIDE 1000 MG: 1 INJECTION, POWDER, FOR SOLUTION INTRAMUSCULAR; INTRAVENOUS at 08:45

## 2020-01-01 RX ADMIN — AMOXICILLIN 500 MG: 500 CAPSULE ORAL at 22:24

## 2020-01-01 RX ADMIN — MIDAZOLAM 0.5 MG: 1 INJECTION INTRAMUSCULAR; INTRAVENOUS at 09:42

## 2020-01-01 RX ADMIN — INSULIN LISPRO 4 UNITS: 100 INJECTION, SOLUTION INTRAVENOUS; SUBCUTANEOUS at 21:34

## 2020-03-04 NOTE — PROGRESS NOTES
Assessment/Plan:    Hypothyroidism   Recheck TSH continue on current dose of levothyroxine    Controlled type 2 diabetes mellitus without complication, without long-term current use of insulin ([facility] [address] )    Lab Results   Component Value Date    HGBA1C 6 5 03/04/2020    she has not been taking Januvia since she was here last in September however her hemoglobin A1c to increase recommend that we she restart 100 mg of Januvia  She did have GI side effects metformin in the   Diabetic eye wrist exam was also performed here today  Benign essential hypertension    Continue lisinopril    Overweight  BMI Counseling: Body mass index is 29 58 kg/m²  The BMI is above normal  Nutrition recommendations include 3-5 servings of fruits/vegetables daily  Gastritis   Continue with as needed famotidine        No follow-ups on file  There are no Patient Instructions on file for this visit  Problem List Items Addressed This Visit        Digestive    Gastritis      Continue with as needed famotidine         Relevant Medications    famotidine (PEPCID) 40 MG tablet       Endocrine    Hypothyroidism      Recheck TSH continue on current dose of levothyroxine         Relevant Medications    levothyroxine 88 mcg tablet    Other Relevant Orders    TSH, 3rd generation    Controlled type 2 diabetes mellitus without complication, without long-term current use of insulin ([facility] [address] ) - Primary       Lab Results   Component Value Date    HGBA1C 6 5 03/04/2020    she has not been taking Januvia since she was here last in September however her hemoglobin A1c to increase recommend that we she restart 100 mg of Januvia  She did have GI side effects metformin in the   Diabetic eye wrist exam was also performed here today           Relevant Orders    POCT hemoglobin A1c (Completed)    Comprehensive metabolic panel    CBC and differential    Lipid panel    Microalbumin / creatinine urine ratio       Cardiovascular and Mediastinum    Benign essential hypertension       Continue lisinopril         Relevant Medications    lisinopril (ZESTRIL) 10 mg tablet       Other    Hyperlipidemia    Relevant Medications    atorvastatin (LIPITOR) 40 mg tablet    Overweight     BMI Counseling: Body mass index is 29 58 kg/m²  The BMI is above normal  Nutrition recommendations include 3-5 servings of fruits/vegetables daily  Anxiety    Relevant Medications    buPROPion (WELLBUTRIN XL) 300 mg 24 hr tablet    Depression    Relevant Medications    buPROPion (WELLBUTRIN XL) 300 mg 24 hr tablet      Other Visit Diagnoses     Malodorous urine        Relevant Orders    POCT urine dip (Completed)    Urine culture        Will await urine culture for treatment of maldorous urine  Subjective:     Sky Wan is a 61 y o  female who  has a past medical history of Anxiety, Breast cancer ([facility] [address] ), Colon polyps, Controlled type 2 diabetes mellitus without complication, without long-term current use of insulin ([facility] [address] ), Depression, Diverticulosis of colon, Encounter for screening colonoscopy, Gastritis, Hemorrhoids, High cholesterol, History of chemotherapy, Hypothyroidism, Lymphedema, S/P radiation therapy, Thyroid cancer ([facility] [address] ), and Wears glasses  She also has no past medical history of History of transfusion, Infectious viral hepatitis, PONV (postoperative nausea and vomiting), Seizures ([facility] [address] ), Shortness of breath, or Sleep apnea  who presented to the office today for diabetes and abdomen pain  She does not eat anything sugary  She was on [place] [facility] [place] before but it was stopped due to lower sugar and weight losss  She just had eye exam but she does not believe diabetic eye screen last month  She does get chronic watery eye on left side but was not advised any treatment         She has been getting abdomen pain  She has a history of diverticulosis and gastritis  She sometimes has pain in RUQ  It is often at night  Stomach pain has been gone for at least 1 week   It started about 4 weeks ago       The stomach pain resolved took the old monitoring pills that she had at her house  She is having normal bowel movements  She is urinating normally however she has noticed an odor to her urine for the last few weeks  She is not having dysuria  She denies any vaginal discharge but have been having vaginal itching  She is not currently sexually active        The following portions of the patient's history were reviewed and updated as appropriate:   She  has a past medical history of Anxiety, Breast cancer ([facility] ), Colon polyps, Controlled type 2 diabetes mellitus without complication, without long-term current use of insulin ([facility] ) (2/26/2019), Depression, Diverticulosis of colon, Encounter for screening colonoscopy, Gastritis, Hemorrhoids, High cholesterol, History of chemotherapy, Hypothyroidism, Lymphedema, S/P radiation therapy, Thyroid cancer ([facility] ), and Wears glasses  She   Patient Active Problem List    Diagnosis Date Noted    Controlled type 2 diabetes mellitus without complication, without long-term current use of insulin ([place] [id number] ) 02/26/2019    Encounter for hepatitis C screening test for low risk patient 02/26/2019    Lower abdominal pain 02/26/2019    Dry eye syndrome of both eyes 02/26/2019    Cough 10/22/2018    Left ankle pain 07/16/2018    Hyperglycemia 03/15/2018    Chronic low back pain 09/22/2017    Agoraphobia 09/22/2017    Anxiety 09/22/2017    Tinea pedis 05/10/2017    Arthralgia of multiple joints 05/10/2017    Overweight 10/07/2016    Hot flashes 10/07/2016    Gastritis 08/31/2015    Breast cancer ([facility] ) 03/30/2015    Hyperlipidemia 11/11/2014    [facility] ([facility] ) 12/06/2013    Lymphedema 08/21/2013    Benign essential hypertension 06/26/2013    Hypothyroidism 10/06/2012    Depression 10/06/2012     She  has a past surgical history that includes Breast surgery (Bilateral); Colonoscopy;  Thyroidectomy; pr colonoscopy flx dx w/collj spec when pfrmd (N/A, 12/13/2016); pr nipple/areola reconstruction (Right, 4/15/2016); pr delay breast pros after breast surg (Right, 1/22/2016); pr removal of breast capsule (Right, 1/22/2016); Breast biopsy; Incisional breast biopsy; Breast lumpectomy (Left); Mastectomy, radical (Right); and Tubal ligation  Her family history includes Cancer in her family; Cervical cancer in her sister; Diabetes in her mother; Liver disease in her brother; No Known Problems in her father; Other in her mother  She  reports that she quit smoking about 10 years ago  She smoked 0 50 packs per day  She has quit using smokeless tobacco  She reports that she drinks alcohol  She reports that she does not use drugs    Current Outpatient Medications   Medication Sig Dispense Refill    anastrozole (ARIMIDEX) 1 mg tablet TAKE 1 TABLET (1 MG TOTAL) BY MOUTH DAILY 90 tablet 1    atorvastatin (LIPITOR) 40 mg tablet Take 1 tablet (40 mg total) by mouth daily 90 tablet 0    buPROPion (WELLBUTRIN XL) 300 mg 24 hr tablet Take 1 tablet (300 mg total) by mouth daily 90 tablet 0    Calcium Citrate 1040 MG TABS Take 1 tablet by mouth daily      famotidine (PEPCID) 40 MG tablet Take 1 tablet (40 mg total) by mouth daily at bedtime as needed for indigestion or heartburn (only takes prn ) 30 tablet 5    levothyroxine 88 mcg tablet Take 1 tablet (88 mcg total) by mouth daily 90 tablet 1    lisinopril (ZESTRIL) 10 mg tablet Take 1 tablet (10 mg total) by mouth daily in the early morning 90 tablet 0    Alcohol Swabs (PHARMACIST CHOICE ALCOHOL) PADS 3 TIMES A DAY BEFORE NAF AFTER BLOODSUGAR TESTING (Patient not taking: Reported on 3/4/2020) 100 each 5    Blood Glucose Monitoring Suppl (FREESTYLE LITE) PARESH Testing twice daily (Patient not taking: Reported on 9/6/2019) 1 each 0    CONTOUR NEXT TEST test strip USE TO CHECK BLOOD SUGAR PILL TWICE A  each 5    JANUVIA 100 MG tablet TAKE 1 TABLET (100 MG TOTAL) BY MOUTH DAILY (Patient not taking: Reported on 3/4/2020) 30 tablet 5    ketoconazole (NIZORAL) 2 % cream 2 (two) times a day Apply sparingly to affected area(s)  3    Pharmacist Choice Lancets MISC USE TO CHECK BLOOD SUGAR PILL TWICE A  each 5     No current facility-administered medications for this visit  Current Outpatient Medications on File Prior to Visit   Medication Sig    anastrozole (ARIMIDEX) 1 mg tablet TAKE 1 TABLET (1 MG TOTAL) BY MOUTH DAILY    Calcium Citrate 1040 MG TABS Take 1 tablet by mouth daily    [DISCONTINUED] atorvastatin (LIPITOR) 40 mg tablet TAKE 1 TABLET (40 MG TOTAL) BY MOUTH DAILY    [DISCONTINUED] buPROPion (WELLBUTRIN XL) 300 mg 24 hr tablet TAKE 1 TABLET (300 MG TOTAL) BY MOUTH DAILY    [DISCONTINUED] famotidine (PEPCID) 40 MG tablet Take 1 tablet (40 mg total) by mouth daily at bedtime as needed for indigestion or heartburn (only takes prn )    [DISCONTINUED] levothyroxine 88 mcg tablet Take 1 tablet (88 mcg total) by mouth daily    [DISCONTINUED] lisinopril (ZESTRIL) 10 mg tablet TAKE 1 TABLET (10 MG TOTAL) BY MOUTH DAILY IN THE EARLY MORNING    Alcohol Swabs (PHARMACIST CHOICE ALCOHOL) PADS 3 TIMES A DAY BEFORE NAF AFTER BLOODSUGAR TESTING (Patient not taking: Reported on 3/4/2020)    Blood Glucose Monitoring Suppl (FREESTYLE LITE) PARESH Testing twice daily (Patient not taking: Reported on 9/6/2019)    JANUVIA 100 MG tablet TAKE 1 TABLET (100 MG TOTAL) BY MOUTH DAILY (Patient not taking: Reported on 3/4/2020)    ketoconazole (NIZORAL) 2 % cream 2 (two) times a day Apply sparingly to affected area(s)    [DISCONTINUED] glucose blood (FREESTYLE LITE) test strip Testing twice daily (Patient not taking: Reported on 9/6/2019)    [DISCONTINUED] Lancets (FREESTYLE) lancets Testing twice daily (Patient not taking: Reported on 9/6/2019)     No current facility-administered medications on file prior to visit  She is allergic to morphine and related       Current Outpatient Medications on File Prior to Visit Medication Sig Dispense Refill    anastrozole (ARIMIDEX) 1 mg tablet TAKE 1 TABLET (1 MG TOTAL) BY MOUTH DAILY 90 tablet 1    Calcium Citrate 1040 MG TABS Take 1 tablet by mouth daily      [DISCONTINUED] atorvastatin (LIPITOR) 40 mg tablet TAKE 1 TABLET (40 MG TOTAL) BY MOUTH DAILY 90 tablet 0    [DISCONTINUED] buPROPion (WELLBUTRIN XL) 300 mg 24 hr tablet TAKE 1 TABLET (300 MG TOTAL) BY MOUTH DAILY 90 tablet 0    [DISCONTINUED] famotidine (PEPCID) 40 MG tablet Take 1 tablet (40 mg total) by mouth daily at bedtime as needed for indigestion or heartburn (only takes prn ) 30 tablet 1    [DISCONTINUED] levothyroxine 88 mcg tablet Take 1 tablet (88 mcg total) by mouth daily 90 tablet 1    [DISCONTINUED] lisinopril (ZESTRIL) 10 mg tablet TAKE 1 TABLET (10 MG TOTAL) BY MOUTH DAILY IN THE EARLY MORNING 90 tablet 0    Alcohol Swabs (PHARMACIST CHOICE ALCOHOL) PADS 3 TIMES A DAY BEFORE NAF AFTER BLOODSUGAR TESTING (Patient not taking: Reported on 3/4/2020) 100 each 5    Blood Glucose Monitoring Suppl (FREESTYLE LITE) PARESH Testing twice daily (Patient not taking: Reported on 9/6/2019) 1 each 0    JANUVIA 100 MG tablet TAKE 1 TABLET (100 MG TOTAL) BY MOUTH DAILY (Patient not taking: Reported on 3/4/2020) 30 tablet 5    ketoconazole (NIZORAL) 2 % cream 2 (two) times a day Apply sparingly to affected area(s)  3    [DISCONTINUED] glucose blood (FREESTYLE LITE) test strip Testing twice daily (Patient not taking: Reported on 9/6/2019) 100 each 5    [DISCONTINUED] Lancets (FREESTYLE) lancets Testing twice daily (Patient not taking: Reported on 9/6/2019) 100 each 5     No current facility-administered medications on file prior to visit  Review of Systems   Constitutional: Negative for activity change, appetite change, chills, fatigue and unexpected weight change  HENT: Negative for dental problem, ear pain, hearing loss and sore throat  Eyes: Positive for discharge  Negative for visual disturbance  Respiratory: Negative for cough and wheezing  Cardiovascular: Negative for chest pain  Gastrointestinal: Positive for abdominal pain  Negative for constipation, diarrhea and vomiting  Genitourinary: Negative for difficulty urinating, dysuria, vaginal discharge and vaginal pain  Musculoskeletal: Positive for back pain (sometimes)  Negative for arthralgias and myalgias  Skin: Negative for rash  Neurological: Positive for numbness (sometimes in the feet)  Negative for dizziness and headaches  Psychiatric/Behavioral: Negative for behavioral problems  Objective:    /80 (BP Location: Right arm, Patient Position: Sitting, Cuff Size: Standard)   Pulse 99   Temp (!) 97 1 °F (36 2 °C) (Temporal)   Resp 16   Ht 5' 3" (1 6 m)   Wt 75 8 kg (167 lb)   LMP 02/21/2010 Comment: post menapausal - D/T chemo  SpO2 96%   Breastfeeding No   BMI 29 58 kg/m²     Physical Exam   Constitutional: She is oriented to person, place, and time  She appears well-developed and well-nourished  HENT:   Head: Normocephalic and atraumatic  Right Ear: External ear normal    Left Ear: External ear normal    Nose: Nose normal    Mouth/Throat: Oropharynx is clear and moist    Eyes: Conjunctivae are normal    Neck: Normal range of motion  Neck supple  No thyromegaly present  Cardiovascular: Normal rate, regular rhythm and normal heart sounds  No murmur heard  Pulses:       Dorsalis pedis pulses are 2+ on the right side, and 2+ on the left side  Posterior tibial pulses are 2+ on the right side, and 2+ on the left side  Pulmonary/Chest: Effort normal and breath sounds normal  No respiratory distress  Abdominal: Soft  Bowel sounds are normal  She exhibits no mass  There is no tenderness  There is no guarding  Musculoskeletal: Normal range of motion  Feet:   Right Foot:   Skin Integrity: Negative for ulcer, skin breakdown, erythema, warmth, callus or dry skin     Left Foot:   Skin Integrity: Negative for ulcer, skin breakdown, erythema, warmth, callus or dry skin  Lymphadenopathy:     She has no cervical adenopathy  Neurological: She is alert and oriented to person, place, and time  Skin: Skin is warm  Psychiatric: She has a normal mood and affect  Her behavior is normal    Nursing note and vitals reviewed  Patient's shoes and socks removed  Right Foot/Ankle   Right Foot Inspection  Skin Exam: skin normal and skin intact no dry skin, no warmth, no callus, no erythema, no maceration, no abnormal color, no pre-ulcer, no ulcer and no callus                          Toe Exam: ROM and strength within normal limits  Sensory   Vibration: intact    Monofilament testing: intact  Vascular    The right DP pulse is 2+  The right PT pulse is 2+  Left Foot/Ankle  Left Foot Inspection  Skin Exam: skin normal and skin intactno dry skin, no warmth, no erythema, no maceration, normal color, no pre-ulcer, no ulcer and no callus                         Toe Exam: ROM and strength within normal limits                   Sensory   Vibration: intact    Monofilament: intact  Vascular    The left DP pulse is 2+  The left PT pulse is 2+  Assign Risk Category:  No deformity present;  No loss of protective sensation;        Risk: 0        Liu López PA-C  03/04/20  12:52 PM

## 2020-03-04 NOTE — ASSESSMENT & PLAN NOTE
BMI Counseling: Body mass index is 29 58 kg/m²  The BMI is above normal  Nutrition recommendations include 3-5 servings of fruits/vegetables daily

## 2020-03-04 NOTE — ASSESSMENT & PLAN NOTE
Lab Results   Component Value Date    HGBA1C 6 5 03/04/2020    she has not been taking Januvia since she was here last in September however her hemoglobin A1c to increase recommend that we she restart 100 mg of Januvia  She did have GI side effects metformin in the   Diabetic eye wrist exam was also performed here today

## 2020-03-06 NOTE — RESULT ENCOUNTER NOTE
Iris exam suspects she has macular degeneration  She did see Dr Jimena Avery and she said they didn't tell her anything  Would she like to go back to him or see someone else for second opion    Macular degeneration is hen the central part of the retina starts to deteriorate and it can lead to blindness

## 2020-03-10 NOTE — RESULT ENCOUNTER NOTE
Urine came back with mixed contaminant  She is still having any urinary symptoms and I would recommend we recheck it  She also did have a little bit of protein in her urine which is likely from blood sugars  I do think start taking the Januvia will help with this and resolve it  Also her calcium came back a little bit high so she is taking a calcium supplement I would decrease the dose

## 2020-05-05 PROBLEM — E87.2 LACTIC ACIDOSIS: Status: ACTIVE | Noted: 2020-01-01

## 2020-05-05 PROBLEM — D72.829 LEUKOCYTOSIS: Status: ACTIVE | Noted: 2020-01-01

## 2020-05-05 PROBLEM — D64.9 ANEMIA: Status: ACTIVE | Noted: 2020-01-01

## 2020-05-05 PROBLEM — R74.01 TRANSAMINITIS: Status: ACTIVE | Noted: 2020-01-01

## 2020-05-05 PROBLEM — N17.9 AKI (ACUTE KIDNEY INJURY) (HCC): Status: ACTIVE | Noted: 2020-01-01

## 2020-05-07 PROBLEM — D58.9 HEMOLYTIC ANEMIA (HCC): Status: ACTIVE | Noted: 2020-01-01

## 2020-05-09 PROBLEM — M31.19 TTP (THROMBOTIC THROMBOCYTOPENIC PURPURA) (HCC): Status: ACTIVE | Noted: 2020-01-01

## 2020-05-12 PROBLEM — D64.9 ANEMIA: Status: ACTIVE | Noted: 2020-01-01

## 2020-05-12 PROBLEM — R31.9 HEMATURIA: Status: ACTIVE | Noted: 2020-01-01

## 2020-05-12 PROBLEM — R25.1 TREMOR OF RIGHT HAND: Status: ACTIVE | Noted: 2020-01-01

## 2020-05-12 PROBLEM — D58.9 HEMOLYTIC ANEMIA (HCC): Status: ACTIVE | Noted: 2020-01-01

## 2020-05-12 PROBLEM — R29.6 UNWITNESSED FALL: Status: ACTIVE | Noted: 2020-01-01

## 2020-05-12 PROBLEM — E87.2 LACTIC ACIDOSIS: Status: RESOLVED | Noted: 2020-01-01 | Resolved: 2020-01-01

## 2020-05-12 PROBLEM — R56.9 SEIZURE-LIKE ACTIVITY (HCC): Status: ACTIVE | Noted: 2020-01-01

## 2020-05-13 PROBLEM — E83.51 HYPOCALCEMIA: Status: ACTIVE | Noted: 2020-01-01

## 2020-05-23 PROBLEM — D59.3 HUS (HEMOLYTIC UREMIC SYNDROME), ATYPICAL (HCC): Status: ACTIVE | Noted: 2020-01-01

## 2020-05-29 PROBLEM — N39.0 UTI (URINARY TRACT INFECTION): Status: ACTIVE | Noted: 2020-01-01

## 2020-06-03 PROBLEM — E43 SEVERE PROTEIN-CALORIE MALNUTRITION (HCC): Status: ACTIVE | Noted: 2020-01-01

## 2020-06-06 LAB
ABO GROUP BLD BPU: NORMAL
BPU ID: NORMAL
UNIT DISPENSE STATUS: NORMAL
UNIT PRODUCT CODE: NORMAL
UNIT RH: NORMAL

## 2020-06-09 LAB — MISCELLANEOUS LAB TEST RESULT: NORMAL

## 2020-06-22 DIAGNOSIS — F32.A DEPRESSION, UNSPECIFIED DEPRESSION TYPE: ICD-10-CM

## 2020-06-22 DIAGNOSIS — F41.9 ANXIETY: ICD-10-CM

## 2020-06-22 RX ORDER — BUPROPION HYDROCHLORIDE 300 MG/1
300 TABLET ORAL DAILY
Qty: 90 TABLET | Refills: 0 | OUTPATIENT
Start: 2020-06-22

## 2024-05-23 NOTE — PROGRESS NOTES
Assessment/Plan:    Controlled type 2 diabetes mellitus without complication, without long-term current use of insulin ([facility])  Lab Results   Component Value Date    HGBA1C 6 1 09/06/2019       No results for input(s): POCGLU in the last 72 hours  Blood Sugar Average: Last 72 hrs:   continue off of Januvia  Continue with healthy diet and regular exercise to lower blood sugar  Hypothyroidism  To recheck TSH again in February    Obesity  BMI Counseling: Body mass index is 29 94 kg/m²  Discussed the patient's BMI with her  The BMI is above average  BMI counseling and education was provided to the patient  Nutrition recommendations include 3-5 servings of fruits/vegetables daily  Problem List Items Addressed This Visit        Endocrine    Hypothyroidism     To recheck TSH again in February         Relevant Medications    levothyroxine 88 mcg tablet    Other Relevant Orders    TSH, 3rd generation    Controlled type 2 diabetes mellitus without complication, without long-term current use of insulin ([facility] [address] ) - Primary     Lab Results   Component Value Date    HGBA1C 6 1 09/06/2019       No results for input(s): POCGLU in the last 72 hours  Blood Sugar Average: Last 72 hrs:   continue off of Januvia  Continue with healthy diet and regular exercise to lower blood sugar  Relevant Orders    POCT hemoglobin A1c (Completed)    Comprehensive metabolic panel    Hemoglobin A1c (w/out EAG)    Lipid panel    CBC and differential       Cardiovascular and Mediastinum    Benign essential hypertension    Relevant Medications    lisinopril (ZESTRIL) 10 mg tablet       Other    Hyperlipidemia    Relevant Medications    atorvastatin (LIPITOR) 40 mg tablet    Obesity     BMI Counseling: Body mass index is 29 94 kg/m²  Discussed the patient's BMI with her  The BMI is above average  BMI counseling and education was provided to the patient  Nutrition recommendations include 3-5 servings of fruits/vegetables daily  Plastic Surgery Consultation Note    This is a 61 year old patient with a Basal cell carcinoma lesion located on the chest, upper, left of median. She presents to discuss options for excision and reconstruction.    Referred by:  Dominick Diaz MD     This lesion has been present for 1 year.     Change in:   Size: No  Color: Yes  Pattern: No    Symptoms of:   Pain: No  Itching: No  Bleeding: No    Focused Past Medical History   - Prior skin cancer:  BCC   - Medical contributors - Prior skin cancer, Age over 50, and fair-skinned    ROS: see MA note    Exam:  BP (!) 148/82 (BP Location: RUE - Right upper extremity, Patient Position: Sitting, Cuff Size: Regular)   Pulse 80   LMP 08/15/2016     General:  A&O x4.  Well-developed and well-nourished.  HEENT:  Normocephalic and atraumatic.  Normal EOM. Inspection of the nose, ears, lips are within normal limits.  Neck:  Normal ROM, supple, trachea midline.  Cardiovascular:  Normal rate. No edema of the extremities.  Pulmonary:  Normal respiratory effort.  Abdomen:  Soft/NT/ND.  MSK: No clubbing/cyanosis of the extremities.  Neurologic: Facial animation is appropriate and symmetric. Sensation of the skin surrounding the lesion is intact.   Skin:  Focused Physical Examination  Size - 10 mm  Color - pink/red  Shape - symmetrically round/oval  Contour - flat  Previous biopsy performed - yes  Psychiatric:  She has a normal mood and affect.  Her behavior is normal.  Judgment and thought content normal.      Assessment - Skin lesion as described above. This is a new consultation for excision with 4 mm margins and reconstruction.    Recommendation - Excision under local anesthesia, complex repair closure.    Surgical treatment discussed with the patient.  The nature of the surgical procedure was explained.  The approximate length and location of the resulting scar were discussed.    Risks were discussed including but not limited to:  Bleeding resulting in bruising   Infection  Anxiety    Relevant Medications    buPROPion (WELLBUTRIN XL) 300 mg 24 hr tablet    Depression    Relevant Medications    buPROPion (WELLBUTRIN XL) 300 mg 24 hr tablet      Other Visit Diagnoses     Closed fracture of tooth, initial encounter        Relevant Orders    Ambulatory referral to Dentistry            Subjective:      Patient ID: Karlene Flores is a 61 y o  female  HPI   70-year-old female here for follow-up of diabetes, hypothyroidism and back pain  She also continues to see Hematology Oncology for breast cancer  She is currently taking anastrozole  She is feeling well but did just start to get middle back pain prior to coming into the office  She did not have any injury  Pain is 4/10 and nonradiating  No urinary symptoms  She has been taking Januvia for her diabetes  Her last hemoglobin A1c was 5 9  The following portions of the patient's history were reviewed and updated as appropriate:   She  has a past medical history of Anxiety, Breast cancer ([address] ), Colon polyps, Controlled type 2 diabetes mellitus without complication, without long-term current use of insulin ([address] ) (2/26/2019), Depression, Diverticulosis of colon, Encounter for screening colonoscopy, Gastritis, Hemorrhoids, High cholesterol, History of chemotherapy, Hypothyroidism, Lymphedema, S/P radiation therapy, Thyroid cancer ([address] ), and Wears glasses    She   Patient Active Problem List    Diagnosis Date Noted    Controlled type 2 diabetes mellitus without complication, without long-term current use of insulin ([address] ) 02/26/2019    Encounter for hepatitis C screening test for low risk patient 02/26/2019    Lower abdominal pain 02/26/2019    Dry eye syndrome of both eyes 02/26/2019    Cough 10/22/2018    Left ankle pain 07/16/2018    Hyperglycemia 03/15/2018    Chronic low back pain 09/22/2017    Agoraphobia 09/22/2017    Anxiety 09/22/2017    Tinea pedis 05/10/2017    Arthralgia of multiple joints 05/10/2017    Obesity resulting in delayed healing and a more prominent scar  Need for re-excision or additional procedures based on the final pathology  A permanent scar will result and it may be significant  Reaction to medications  Need for local wound care    All questions were answered.  Instructions were provided.    Identified risk factors for the procedure includes: none.           Past Medical History:   Diagnosis Date    Acne     scalp    Basal cell carcinoma (BCC) of right side of nose 2024    Mohs surgery, Dr Lazo, right nasal sidewall, repair Dr Blankenship    Basal cell carcinoma of skin     top of chest    Carpal tunnel syndrome on both sides 2016    Released on right and injection on left     Fracture     LLE,     Spotting     Dr. Rahman for post menopausal spotting    Telogen effluvium 2013    Dr. Rolon for hair loss and dermatitis     Past Surgical History:   Procedure Laterality Date    Adj tiss xfer lid,nos,ear <10sqcm Right 2024    Mohs defect repair right nasal sidewall  Dr. Blankenship    Ankle fracture surgery      Carpal tunnel release Right 2016    Carpal Tunnel Release Dr Gutierrez     section, low transverse      x2    Colonoscopy  10/21/2016    jose 10 yr recall    Fl fluoro guide hip injection right Right 2018    Right hip FL Kenalog injection     Fracture surgery      LLE    Total hip replacement Right 2020    Right Total Hip Replacement, Direct Anterior~Dr. Shyam Kuhn[place] [facility] [place]    Varicose vein surgery       Current Outpatient Medications   Medication Sig Dispense Refill    ketoconazole (NIZORAL) 2 % shampoo Up to once daily for scalp. Lather, leave on for 5 minutes, then rinse. (Patient not taking: Reported on 2024) 120 mL 4    mupirocin (BACTROBAN) 2 % ointment Apply in nostril three times daily for 2 weeks if not improved need to be seen (Patient not taking: Reported on 3/28/2024) 22 g 0    IBUPROFEN PO       cephalexin (KEFLEX) 500 MG capsule  10/07/2016    Hot flashes 10/07/2016    Gastritis 08/31/2015    Breast cancer ([facility] ) 03/30/2015    Hyperlipidemia 11/11/2014    Malignant neoplasm of left breast ([facility] ) 12/06/2013    Lymphedema 08/21/2013    Benign essential hypertension 06/26/2013    Hypothyroidism 10/06/2012    Depression 10/06/2012     She  has a past surgical history that includes Breast surgery (Bilateral); Colonoscopy; Thyroidectomy; pr colonoscopy flx dx w/collj spec when pfrmd (N/A, 12/13/2016); pr nipple/areola reconstruction (Right, 4/15/2016); pr delay breast pros after breast surg (Right, 1/22/2016); pr removal of breast capsule (Right, 1/22/2016); Breast biopsy; Incisional breast biopsy; Breast lumpectomy (Left); Mastectomy, radical (Right); and Tubal ligation  Her family history includes Cancer in her family; Cervical cancer in her sister; Diabetes in her mother; Liver disease in her brother; No Known Problems in her father; Other in her mother  She  reports that she quit smoking about 9 years ago  She smoked 0 50 packs per day  She has quit using smokeless tobacco  She reports that she drinks alcohol  She reports that she does not use drugs    Current Outpatient Medications   Medication Sig Dispense Refill    anastrozole (ARIMIDEX) 1 mg tablet Take 1 tablet (1 mg total) by mouth daily 90 tablet 1    atorvastatin (LIPITOR) 40 mg tablet Take 1 tablet (40 mg total) by mouth daily 90 tablet 1    buPROPion (WELLBUTRIN XL) 300 mg 24 hr tablet Take 1 tablet (300 mg total) by mouth daily 90 tablet 0    Calcium Citrate 1040 MG TABS Take 1 tablet by mouth daily      famotidine (PEPCID) 40 MG tablet Take 1 tablet (40 mg total) by mouth daily at bedtime as needed for indigestion or heartburn (only takes prn ) 30 tablet 1    levothyroxine 88 mcg tablet Take 1 tablet (88 mcg total) by mouth daily 90 tablet 1    lisinopril (ZESTRIL) 10 mg tablet Take 1 tablet (10 mg total) by mouth daily in the early morning 90 tablet 1    Blood Take 2 tablets 1 hour prior to dental appointment (Patient not taking: Reported on 2024) 4 capsule 0    fluocinonide (LIDEX) 0.05 % topical solution Apply topically once daily as needed for itching of scalp (Patient not taking: Reported on 2024) 60 mL 2    acetaminophen (TYLENOL) 325 MG tablet Take 2 tablets by mouth every 6 hours as needed for Pain. (Patient not taking: Reported on 2024) 40 tablet 0    diphenhydramine-acetaminophen (Acetaminophen PM)  MG Tab Take 1 tablet by mouth nightly as needed. (Patient not taking: Reported on 2024)      cholecalciferol (VITAMIN D) 25 mcg (1,000 units) tablet Take 1 tablet by mouth daily. 90 tablet 0     No current facility-administered medications for this visit.     ALLERGIES:  No Known Allergies    Family History   Problem Relation Age of Onset    Musculoskeletal Mother         cervical and lumbar spine surgery    Heart Father         coronary bypass surgery; bicuspid AV    Musculoskeletal Brother         cervical spine surgery    Arrhythmia Brother        Social History     Tobacco Use    Smoking status: Former     Current packs/day: 0.00     Average packs/day: 1 pack/day for 10.0 years (10.0 ttl pk-yrs)     Types: Cigarettes     Start date: 1983     Quit date: 1993     Years since quittin.4    Smokeless tobacco: Never   Substance Use Topics    Alcohol use: Not Currently     Comment: occasional    Drug use: No        Glucose Monitoring Suppl (FREESTYLE LITE) PARESH Testing twice daily (Patient not taking: Reported on 9/6/2019) 1 each 0    glucose blood (FREESTYLE LITE) test strip Testing twice daily (Patient not taking: Reported on 9/6/2019) 100 each 5    ketoconazole (NIZORAL) 2 % cream 2 (two) times a day Apply sparingly to affected area(s)  3    Lancets (FREESTYLE) lancets Testing twice daily (Patient not taking: Reported on 9/6/2019) 100 each 5    PHARMACIST CHOICE ALCOHOL 70 % PADS 3 TIMES A DAY BEFORE NAF AFTER BLOODSUGAR TESTING (Patient not taking: Reported on 9/6/2019) 100 each 2     No current facility-administered medications for this visit        Current Outpatient Medications on File Prior to Visit   Medication Sig    anastrozole (ARIMIDEX) 1 mg tablet Take 1 tablet (1 mg total) by mouth daily    Calcium Citrate 1040 MG TABS Take 1 tablet by mouth daily    famotidine (PEPCID) 40 MG tablet Take 1 tablet (40 mg total) by mouth daily at bedtime as needed for indigestion or heartburn (only takes prn )    [DISCONTINUED] atorvastatin (LIPITOR) 40 mg tablet TAKE 1 TABLET (40 MG TOTAL) BY MOUTH DAILY    [DISCONTINUED] buPROPion (WELLBUTRIN XL) 300 mg 24 hr tablet Take 1 tablet (300 mg total) by mouth daily    [DISCONTINUED] levothyroxine 88 mcg tablet Take 1 tablet (88 mcg total) by mouth daily    [DISCONTINUED] lisinopril (ZESTRIL) 10 mg tablet TAKE 1 TABLET (10 MG TOTAL) BY MOUTH DAILY IN THE EARLY MORNING    Blood Glucose Monitoring Suppl (FREESTYLE LITE) PARESH Testing twice daily (Patient not taking: Reported on 9/6/2019)    glucose blood (FREESTYLE LITE) test strip Testing twice daily (Patient not taking: Reported on 9/6/2019)    ketoconazole (NIZORAL) 2 % cream 2 (two) times a day Apply sparingly to affected area(s)    Lancets (FREESTYLE) lancets Testing twice daily (Patient not taking: Reported on 9/6/2019)    PHARMACIST CHOICE ALCOHOL 70 % PADS 3 TIMES A DAY BEFORE NAF AFTER BLOODSUGAR TESTING (Patient not taking: Reported on 9/6/2019)    [DISCONTINUED] JANUVIA 100 MG tablet TAKE 1 TABLET (100 MG TOTAL) BY MOUTH DAILY (Patient not taking: Reported on 9/6/2019)     No current facility-administered medications on file prior to visit  She is allergic to morphine and related       Review of Systems   Constitutional: Negative for activity change, appetite change, chills, fatigue and unexpected weight change  HENT: Positive for dental problem (chipped tooth)  Negative for ear pain, hearing loss and sore throat  Eyes: Negative for visual disturbance  Respiratory: Negative for cough and wheezing  Cardiovascular: Negative for chest pain  Gastrointestinal: Negative for abdominal pain, constipation, diarrhea and vomiting  Genitourinary: Negative for difficulty urinating and dysuria  Musculoskeletal: Positive for back pain  Negative for arthralgias and myalgias  Skin: Negative for rash  Neurological: Negative for dizziness and headaches  Psychiatric/Behavioral: Negative for behavioral problems  Objective:      /84 (BP Location: Right arm, Patient Position: Sitting, Cuff Size: Standard)   Pulse 90   Temp 97 5 °F (36 4 °C) (Temporal)   Resp 18   Ht 5' 3" (1 6 m)   Wt 76 7 kg (169 lb)   LMP 02/21/2010 Comment: post menapausal - D/T chemo  SpO2 97%   Breastfeeding? No   BMI 29 94 kg/m²          Physical Exam   Constitutional: She is oriented to person, place, and time  She appears well-developed and well-nourished  No distress  HENT:   Head: Normocephalic and atraumatic  Right Ear: External ear normal    Left Ear: External ear normal    Eyes: Conjunctivae are normal    Neck: Normal range of motion  Neck supple  No thyromegaly present  Cardiovascular: Normal rate, regular rhythm and normal heart sounds  No murmur heard  Pulmonary/Chest: Breath sounds normal  No respiratory distress  She has no wheezes     Musculoskeletal: She exhibits tenderness (right sided thoracic tenderness)  She exhibits no edema or deformity  Lymphadenopathy:     She has no cervical adenopathy  Neurological: She is alert and oriented to person, place, and time  Psychiatric: She has a normal mood and affect  Her behavior is normal    Nursing note and vitals reviewed